# Patient Record
Sex: FEMALE | Race: WHITE | Employment: FULL TIME | URBAN - METROPOLITAN AREA
[De-identification: names, ages, dates, MRNs, and addresses within clinical notes are randomized per-mention and may not be internally consistent; named-entity substitution may affect disease eponyms.]

---

## 2020-12-09 ENCOUNTER — HOSPITAL ENCOUNTER (OUTPATIENT)
Dept: ULTRASOUND IMAGING | Facility: HOSPITAL | Age: 50
Discharge: HOME | End: 2020-12-09
Attending: NURSE PRACTITIONER

## 2020-12-09 DIAGNOSIS — N92.5 OTHER SPECIFIED IRREGULAR MENSTRUATION: ICD-10-CM

## 2020-12-09 PROCEDURE — 76830 TRANSVAGINAL US NON-OB: CPT | Performed by: NURSE PRACTITIONER

## 2020-12-09 PROCEDURE — 76856 US EXAM PELVIC COMPLETE: CPT | Performed by: NURSE PRACTITIONER

## 2021-01-15 ENCOUNTER — OFFICE VISIT (OUTPATIENT)
Dept: FAMILY MEDICINE CLINIC | Facility: CLINIC | Age: 51
End: 2021-01-15
Payer: COMMERCIAL

## 2021-01-15 VITALS
WEIGHT: 243.13 LBS | HEIGHT: 63 IN | SYSTOLIC BLOOD PRESSURE: 134 MMHG | TEMPERATURE: 98 F | HEART RATE: 71 BPM | DIASTOLIC BLOOD PRESSURE: 81 MMHG | BODY MASS INDEX: 43.08 KG/M2

## 2021-01-15 DIAGNOSIS — M25.562 CHRONIC PAIN OF BOTH KNEES: ICD-10-CM

## 2021-01-15 DIAGNOSIS — Z23 FLU VACCINE NEED: ICD-10-CM

## 2021-01-15 DIAGNOSIS — M25.561 CHRONIC PAIN OF BOTH KNEES: ICD-10-CM

## 2021-01-15 DIAGNOSIS — R03.0 ELEVATED BP WITHOUT DIAGNOSIS OF HYPERTENSION: ICD-10-CM

## 2021-01-15 DIAGNOSIS — G89.29 CHRONIC PAIN OF BOTH KNEES: ICD-10-CM

## 2021-01-15 DIAGNOSIS — G43.709 CHRONIC MIGRAINE WITHOUT AURA WITHOUT STATUS MIGRAINOSUS, NOT INTRACTABLE: ICD-10-CM

## 2021-01-15 DIAGNOSIS — D25.9 UTERINE LEIOMYOMA, UNSPECIFIED LOCATION: Primary | ICD-10-CM

## 2021-01-15 PROCEDURE — 90471 IMMUNIZATION ADMIN: CPT | Performed by: FAMILY MEDICINE

## 2021-01-15 PROCEDURE — 99203 OFFICE O/P NEW LOW 30 MIN: CPT | Performed by: FAMILY MEDICINE

## 2021-01-15 PROCEDURE — 90686 IIV4 VACC NO PRSV 0.5 ML IM: CPT | Performed by: FAMILY MEDICINE

## 2021-01-15 PROCEDURE — 3075F SYST BP GE 130 - 139MM HG: CPT | Performed by: FAMILY MEDICINE

## 2021-01-15 PROCEDURE — 3079F DIAST BP 80-89 MM HG: CPT | Performed by: FAMILY MEDICINE

## 2021-01-15 PROCEDURE — 3008F BODY MASS INDEX DOCD: CPT | Performed by: FAMILY MEDICINE

## 2021-01-15 RX ORDER — RIZATRIPTAN BENZOATE 10 MG/1
10 TABLET ORAL AS NEEDED
Qty: 15 TABLET | Refills: 0 | Status: SHIPPED | OUTPATIENT
Start: 2021-01-15 | End: 2021-02-10

## 2021-01-15 NOTE — PROGRESS NOTES
HPI:    Sena Muñoz is a 52year old adult presents to clinic as a new patient to establish care. Patient had a recent visit at [facility] for pelvic pain and irregular menses. Has a history of fibroids.   Had a recent ultrasound done-would Constitutional: No distress. HENT:   Head: Normocephalic and atraumatic. Neck: Normal range of motion. Neck supple. No thyromegaly present. Cardiovascular: Normal rate, regular rhythm and normal heart sounds.    Pulmonary/Chest: Effort normal and br Prescriptions     Signed Prescriptions Disp Refills   • Rizatriptan Benzoate 10 MG Oral Tab 15 tablet 0     Sig: Take 1 tablet (10 mg total) by mouth as needed for Migraine.        Imaging & Referrals:  FLULAVAL INFLUENZA VACCINE QUAD PRESERVATIVE FREE 0.5

## 2021-02-10 ENCOUNTER — PATIENT MESSAGE (OUTPATIENT)
Dept: FAMILY MEDICINE CLINIC | Facility: CLINIC | Age: 51
End: 2021-02-10

## 2021-02-10 ENCOUNTER — OFFICE VISIT (OUTPATIENT)
Dept: OBGYN CLINIC | Facility: CLINIC | Age: 51
End: 2021-02-10
Payer: COMMERCIAL

## 2021-02-10 VITALS
SYSTOLIC BLOOD PRESSURE: 142 MMHG | WEIGHT: 243 LBS | DIASTOLIC BLOOD PRESSURE: 96 MMHG | HEIGHT: 63 IN | BODY MASS INDEX: 43.05 KG/M2

## 2021-02-10 DIAGNOSIS — D21.9 FIBROIDS: ICD-10-CM

## 2021-02-10 DIAGNOSIS — I10 ESSENTIAL HYPERTENSION: Primary | ICD-10-CM

## 2021-02-10 DIAGNOSIS — Z12.31 BREAST CANCER SCREENING BY MAMMOGRAM: ICD-10-CM

## 2021-02-10 PROCEDURE — 3077F SYST BP >= 140 MM HG: CPT | Performed by: OBSTETRICS & GYNECOLOGY

## 2021-02-10 PROCEDURE — 3080F DIAST BP >= 90 MM HG: CPT | Performed by: OBSTETRICS & GYNECOLOGY

## 2021-02-10 PROCEDURE — 3008F BODY MASS INDEX DOCD: CPT | Performed by: OBSTETRICS & GYNECOLOGY

## 2021-02-10 PROCEDURE — 99205 OFFICE O/P NEW HI 60 MIN: CPT | Performed by: OBSTETRICS & GYNECOLOGY

## 2021-02-10 RX ORDER — LORATADINE 10 MG/1
10 TABLET ORAL DAILY
COMMUNITY

## 2021-02-10 RX ORDER — IBUPROFEN 600 MG/1
600 TABLET ORAL EVERY 6 HOURS PRN
Status: ON HOLD | COMMUNITY
End: 2021-03-11

## 2021-02-10 NOTE — TELEPHONE ENCOUNTER
From: Sena Muñoz  To: Jose F ePrez MD  Sent: 2/10/2021 12:59 AM CST  Subject: Prescription Question    Dear Dr. Grant Madrigal,    I kept meaning to message you about the Rizatriptan Benzoate you prescribed for my migraines.  Unfortunately, it did not go

## 2021-02-10 NOTE — PROGRESS NOTES
NEW GYN H&P     2/10/2021  11:02 AM    Patient presents with:  New Patient: referred by Dr. Radha Sawyer for large Fibroid seen on 2020 N([place])  Annual: Ariadna Morris   .    HPI: Patient is a 52year old  LMP 21 referred to consult about l bladder issue        Penicillin G            NAUSEA AND VOMITING  Family History   Problem Relation Age of Onset   • Heart Disorder Father    • Heart Disorder Mother    • Breast Cancer Sister 28     Social History    Socioeconomic History      Marital stat masses  Cul-de-sac: normal  R/V: normal perineum, no hemorrhoids  EXTREMITIES:  nontender without edema        A/P: Patient is 52year old female       1. Essential hypertension  - Cklearance for surgery form PCP    2.  Fibroids  - Desires FENG with ovarian

## 2021-02-12 ENCOUNTER — TELEPHONE (OUTPATIENT)
Dept: OBGYN CLINIC | Facility: CLINIC | Age: 51
End: 2021-02-12

## 2021-02-12 NOTE — TELEPHONE ENCOUNTER
Spoke to patient and patient would like to have surgery on 3/9/21.   Patient set up for presurgical visit with Dr. Bryan Canseco on 3/3/21

## 2021-02-12 NOTE — TELEPHONE ENCOUNTER
Spoke with Dr. Bethanie Max about scheduling surgery. EB wants patient to have pre-op clearance letter from PCP Dr. Michael Wiggins because of untreated hypertension. Patient will call Dr. Michael Wigigns to schedule appointment for pre-operative clearance.   I asked the kallie

## 2021-02-12 NOTE — TELEPHONE ENCOUNTER
The patient called back and wanted to know what to do with the medication she does not need anymore. Informed the patient of the drop off medication box at the [facility].  She stated she will take them there.

## 2021-02-17 ENCOUNTER — HOSPITAL ENCOUNTER (OUTPATIENT)
Dept: GENERAL RADIOLOGY | Facility: HOSPITAL | Age: 51
Discharge: HOME | End: 2021-02-17
Attending: FAMILY MEDICINE
Payer: COMMERCIAL

## 2021-02-17 DIAGNOSIS — M25.562 CHRONIC PAIN OF BOTH KNEES: ICD-10-CM

## 2021-02-17 DIAGNOSIS — G89.29 CHRONIC PAIN OF BOTH KNEES: ICD-10-CM

## 2021-02-17 DIAGNOSIS — M25.561 CHRONIC PAIN OF BOTH KNEES: ICD-10-CM

## 2021-02-17 PROCEDURE — 73564 X-RAY EXAM KNEE 4 OR MORE: CPT | Performed by: FAMILY MEDICINE

## 2021-02-22 ENCOUNTER — OFFICE VISIT (OUTPATIENT)
Dept: FAMILY MEDICINE CLINIC | Facility: CLINIC | Age: 51
End: 2021-02-22
Payer: COMMERCIAL

## 2021-02-22 VITALS
WEIGHT: 246.5 LBS | DIASTOLIC BLOOD PRESSURE: 87 MMHG | HEIGHT: 63 IN | HEART RATE: 101 BPM | BODY MASS INDEX: 43.68 KG/M2 | SYSTOLIC BLOOD PRESSURE: 137 MMHG | TEMPERATURE: 97 F

## 2021-02-22 DIAGNOSIS — M17.0 ARTHRITIS OF BOTH KNEES: ICD-10-CM

## 2021-02-22 DIAGNOSIS — R03.0 ELEVATED BLOOD PRESSURE READING: Primary | ICD-10-CM

## 2021-02-22 PROCEDURE — 3075F SYST BP GE 130 - 139MM HG: CPT | Performed by: FAMILY MEDICINE

## 2021-02-22 PROCEDURE — 3008F BODY MASS INDEX DOCD: CPT | Performed by: FAMILY MEDICINE

## 2021-02-22 PROCEDURE — 99214 OFFICE O/P EST MOD 30 MIN: CPT | Performed by: FAMILY MEDICINE

## 2021-02-22 PROCEDURE — 3079F DIAST BP 80-89 MM HG: CPT | Performed by: FAMILY MEDICINE

## 2021-02-22 NOTE — PROGRESS NOTES
HPI:    Barbara Botello is a 52year old adult presents to clinic for follow-up. Is having hysterectomy next month with Dr. Nancy Cross, reports that her last visit her blood pressure was elevated.  Patient denies HAs, blurry vision, nausea, vomiting, CP, p motion. Neck supple. No thyromegaly present. Cardiovascular: Normal rate, regular rhythm and normal heart sounds. Pulmonary/Chest: Effort normal and breath sounds normal. No respiratory distress. Maximiliano Dong has no wheezes.  Maximiliano Dong has no

## 2021-02-24 ENCOUNTER — ORDERS FOR ADMISSION (OUTPATIENT)
Dept: OBGYN CLINIC | Facility: CLINIC | Age: 51
End: 2021-02-24

## 2021-02-24 RX ORDER — HEPARIN SODIUM 5000 [USP'U]/ML
5000 INJECTION, SOLUTION INTRAVENOUS; SUBCUTANEOUS ONCE
Status: CANCELLED | OUTPATIENT
Start: 2021-03-09 | End: 2021-02-24

## 2021-02-24 RX ORDER — SODIUM CHLORIDE, SODIUM LACTATE, POTASSIUM CHLORIDE, CALCIUM CHLORIDE 600; 310; 30; 20 MG/100ML; MG/100ML; MG/100ML; MG/100ML
INJECTION, SOLUTION INTRAVENOUS CONTINUOUS
Status: CANCELLED | OUTPATIENT
Start: 2021-02-24

## 2021-03-02 ENCOUNTER — OFFICE VISIT (OUTPATIENT)
Dept: ORTHOPEDICS CLINIC | Facility: CLINIC | Age: 51
End: 2021-03-02
Payer: COMMERCIAL

## 2021-03-02 VITALS — WEIGHT: 242 LBS | BODY MASS INDEX: 42.88 KG/M2 | HEIGHT: 63 IN

## 2021-03-02 DIAGNOSIS — M17.0 BILATERAL PRIMARY OSTEOARTHRITIS OF KNEE: Primary | ICD-10-CM

## 2021-03-02 PROCEDURE — 3008F BODY MASS INDEX DOCD: CPT | Performed by: ORTHOPAEDIC SURGERY

## 2021-03-02 PROCEDURE — 99203 OFFICE O/P NEW LOW 30 MIN: CPT | Performed by: ORTHOPAEDIC SURGERY

## 2021-03-03 ENCOUNTER — OFFICE VISIT (OUTPATIENT)
Dept: OBGYN CLINIC | Facility: CLINIC | Age: 51
End: 2021-03-03
Payer: COMMERCIAL

## 2021-03-03 VITALS
DIASTOLIC BLOOD PRESSURE: 90 MMHG | WEIGHT: 248 LBS | HEIGHT: 63 IN | BODY MASS INDEX: 43.94 KG/M2 | SYSTOLIC BLOOD PRESSURE: 142 MMHG

## 2021-03-03 DIAGNOSIS — D21.9 FIBROIDS: ICD-10-CM

## 2021-03-03 DIAGNOSIS — Z01.818 PRE-OP EVALUATION: Primary | ICD-10-CM

## 2021-03-03 PROCEDURE — 3008F BODY MASS INDEX DOCD: CPT | Performed by: OBSTETRICS & GYNECOLOGY

## 2021-03-03 PROCEDURE — 3077F SYST BP >= 140 MM HG: CPT | Performed by: OBSTETRICS & GYNECOLOGY

## 2021-03-03 PROCEDURE — 3080F DIAST BP >= 90 MM HG: CPT | Performed by: OBSTETRICS & GYNECOLOGY

## 2021-03-03 PROCEDURE — 99215 OFFICE O/P EST HI 40 MIN: CPT | Performed by: OBSTETRICS & GYNECOLOGY

## 2021-03-03 NOTE — H&P
[address], [facility]    History & Physical    Pilar Jeanie Patient Status:  No patient class for patient encounter    1970 MRN [id number]   Location [address] Psychiatric: no complaints  Endocrine:no complaints  Neurological: no complaints  Immunological: no complaints  Musculoskeletal:no complaints    Physical Exam:   /90   Ht 63\"   Wt 248 lb (112.5 kg)   LMP 02/04/2021 (Exact Date)   BMI 43.93 kg/m²

## 2021-03-03 NOTE — H&P
NURSING INTAKE COMMENTS: Patient presents with:  Knee Pain: Bilateral- pt states pain started zohaib 1 yr ago, but pain increased in  the past 4 months. pt denies any injury. . pt had XR.       HPI: This 52year old adult presents today with complaints of  Review of Systems:  GENERAL: feels generally well, no recent fevers or chills, no significant weight loss or weight gain  SKIN: denies worrisome skin lesions  EYES: denies blurred vision or double vision  HEENT: denies new nasal congestion  PULM: denie similar but less pronounced changes involving the lateral compartment. SOFT TISSUES: Negative. No visible soft tissue swelling. EFFUSION: Small joint effusion noted. OTHER: Negative. CONCLUSION:  1. Demineralization.  2. Moderate to severe osteoarth and Tylenol. We also discussed knee injections options including corticosteroid and viscosupplement injections. I discussed the risks, benefits and alternatives to these injections with the patient.   Finally, I discussed definitive surgical management wi

## 2021-03-03 NOTE — H&P (VIEW-ONLY)
[address], [facility]    History & Physical    Mercedes Morrison Patient Status:  No patient class for patient encounter    1970 MRN [id number]   Location [address] Psychiatric: no complaints  Endocrine:no complaints  Neurological: no complaints  Immunological: no complaints  Musculoskeletal:no complaints    Physical Exam:   /90   Ht 63\"   Wt 248 lb (112.5 kg)   LMP 02/04/2021 (Exact Date)   BMI 43.93 kg/m²

## 2021-03-06 ENCOUNTER — LAB ENCOUNTER (OUTPATIENT)
Dept: LAB | Facility: HOSPITAL | Age: 51
End: 2021-03-06
Attending: OBSTETRICS & GYNECOLOGY
Payer: COMMERCIAL

## 2021-03-06 DIAGNOSIS — Z01.818 PREOP TESTING: ICD-10-CM

## 2021-03-06 LAB
ANTIBODY SCREEN: NEGATIVE
BASOPHILS # BLD AUTO: 0.08 X10(3) UL (ref 0–0.2)
BASOPHILS NFR BLD AUTO: 1 %
DEPRECATED RDW RBC AUTO: 47.4 FL (ref 35.1–46.3)
EOSINOPHIL # BLD AUTO: 0.28 X10(3) UL (ref 0–0.7)
EOSINOPHIL NFR BLD AUTO: 3.4 %
ERYTHROCYTE [DISTWIDTH] IN BLOOD BY AUTOMATED COUNT: 15.7 % (ref 11–15)
HCT VFR BLD AUTO: 34.8 %
HGB BLD-MCNC: 10.6 G/DL
IMM GRANULOCYTES # BLD AUTO: 0.02 X10(3) UL (ref 0–1)
IMM GRANULOCYTES NFR BLD: 0.2 %
LYMPHOCYTES # BLD AUTO: 2.05 X10(3) UL (ref 1–4)
LYMPHOCYTES NFR BLD AUTO: 24.8 %
MCH RBC QN AUTO: 25.1 PG (ref 26–34)
MCHC RBC AUTO-ENTMCNC: 30.5 G/DL (ref 31–37)
MCV RBC AUTO: 82.5 FL
MONOCYTES # BLD AUTO: 0.7 X10(3) UL (ref 0.1–1)
MONOCYTES NFR BLD AUTO: 8.5 %
NEUTROPHILS # BLD AUTO: 5.12 X10 (3) UL (ref 1.5–7.7)
NEUTROPHILS # BLD AUTO: 5.12 X10(3) UL (ref 1.5–7.7)
NEUTROPHILS NFR BLD AUTO: 62.1 %
PLATELET # BLD AUTO: 351 10(3)UL (ref 150–450)
RBC # BLD AUTO: 4.22 X10(6)UL
RH BLOOD TYPE: POSITIVE
WBC # BLD AUTO: 8.3 X10(3) UL (ref 4–11)

## 2021-03-06 PROCEDURE — 36415 COLL VENOUS BLD VENIPUNCTURE: CPT

## 2021-03-06 PROCEDURE — 86901 BLOOD TYPING SEROLOGIC RH(D): CPT

## 2021-03-06 PROCEDURE — 86850 RBC ANTIBODY SCREEN: CPT

## 2021-03-06 PROCEDURE — 86900 BLOOD TYPING SEROLOGIC ABO: CPT

## 2021-03-06 PROCEDURE — 85025 COMPLETE CBC W/AUTO DIFF WBC: CPT

## 2021-03-07 LAB — SARS-COV-2 RNA RESP QL NAA+PROBE: NOT DETECTED

## 2021-03-09 ENCOUNTER — HOSPITAL ENCOUNTER (INPATIENT)
Facility: HOSPITAL | Age: 51
LOS: 2 days | Discharge: HOME | DRG: 743 | End: 2021-03-11
Attending: OBSTETRICS & GYNECOLOGY | Admitting: OBSTETRICS & GYNECOLOGY
Payer: COMMERCIAL

## 2021-03-09 ENCOUNTER — ANESTHESIA EVENT (OUTPATIENT)
Dept: SURGERY | Facility: HOSPITAL | Age: 51
DRG: 743 | End: 2021-03-09
Payer: COMMERCIAL

## 2021-03-09 ENCOUNTER — ANESTHESIA (OUTPATIENT)
Dept: SURGERY | Facility: HOSPITAL | Age: 51
DRG: 743 | End: 2021-03-09
Payer: COMMERCIAL

## 2021-03-09 DIAGNOSIS — D21.9 FIBROIDS: ICD-10-CM

## 2021-03-09 DIAGNOSIS — Z01.818 PREOP TESTING: Primary | ICD-10-CM

## 2021-03-09 PROBLEM — Z98.890 POST-OPERATIVE STATE: Status: ACTIVE | Noted: 2021-03-09

## 2021-03-09 LAB — B-HCG UR QL: NEGATIVE

## 2021-03-09 PROCEDURE — 0UT20ZZ RESECTION OF BILATERAL OVARIES, OPEN APPROACH: ICD-10-PCS | Performed by: OBSTETRICS & GYNECOLOGY

## 2021-03-09 PROCEDURE — 0UT90ZZ RESECTION OF UTERUS, OPEN APPROACH: ICD-10-PCS | Performed by: OBSTETRICS & GYNECOLOGY

## 2021-03-09 PROCEDURE — 0UT70ZZ RESECTION OF BILATERAL FALLOPIAN TUBES, OPEN APPROACH: ICD-10-PCS | Performed by: OBSTETRICS & GYNECOLOGY

## 2021-03-09 RX ORDER — HYDROCODONE BITARTRATE AND ACETAMINOPHEN 5; 325 MG/1; MG/1
2 TABLET ORAL AS NEEDED
Status: DISCONTINUED | OUTPATIENT
Start: 2021-03-09 | End: 2021-03-09 | Stop reason: HOSPADM

## 2021-03-09 RX ORDER — PROCHLORPERAZINE EDISYLATE 5 MG/ML
5 INJECTION INTRAMUSCULAR; INTRAVENOUS ONCE AS NEEDED
Status: COMPLETED | OUTPATIENT
Start: 2021-03-09 | End: 2021-03-09

## 2021-03-09 RX ORDER — HYDROMORPHONE HYDROCHLORIDE 1 MG/ML
0.6 INJECTION, SOLUTION INTRAMUSCULAR; INTRAVENOUS; SUBCUTANEOUS EVERY 5 MIN PRN
Status: DISCONTINUED | OUTPATIENT
Start: 2021-03-09 | End: 2021-03-09 | Stop reason: HOSPADM

## 2021-03-09 RX ORDER — SODIUM PHOSPHATE, DIBASIC AND SODIUM PHOSPHATE, MONOBASIC 7; 19 G/133ML; G/133ML
1 ENEMA RECTAL ONCE AS NEEDED
Status: DISCONTINUED | OUTPATIENT
Start: 2021-03-09 | End: 2021-03-11

## 2021-03-09 RX ORDER — MORPHINE SULFATE 10 MG/ML
6 INJECTION, SOLUTION INTRAMUSCULAR; INTRAVENOUS EVERY 10 MIN PRN
Status: DISCONTINUED | OUTPATIENT
Start: 2021-03-09 | End: 2021-03-09 | Stop reason: HOSPADM

## 2021-03-09 RX ORDER — ONDANSETRON 2 MG/ML
INJECTION INTRAMUSCULAR; INTRAVENOUS AS NEEDED
Status: DISCONTINUED | OUTPATIENT
Start: 2021-03-09 | End: 2021-03-09 | Stop reason: SURG

## 2021-03-09 RX ORDER — MORPHINE SULFATE 4 MG/ML
4 INJECTION, SOLUTION INTRAMUSCULAR; INTRAVENOUS EVERY 10 MIN PRN
Status: DISCONTINUED | OUTPATIENT
Start: 2021-03-09 | End: 2021-03-09 | Stop reason: HOSPADM

## 2021-03-09 RX ORDER — SODIUM CHLORIDE, SODIUM LACTATE, POTASSIUM CHLORIDE, CALCIUM CHLORIDE 600; 310; 30; 20 MG/100ML; MG/100ML; MG/100ML; MG/100ML
INJECTION, SOLUTION INTRAVENOUS CONTINUOUS
Status: DISCONTINUED | OUTPATIENT
Start: 2021-03-09 | End: 2021-03-09 | Stop reason: HOSPADM

## 2021-03-09 RX ORDER — MIDAZOLAM HYDROCHLORIDE 1 MG/ML
INJECTION INTRAMUSCULAR; INTRAVENOUS AS NEEDED
Status: DISCONTINUED | OUTPATIENT
Start: 2021-03-09 | End: 2021-03-09 | Stop reason: SURG

## 2021-03-09 RX ORDER — SODIUM CHLORIDE, SODIUM LACTATE, POTASSIUM CHLORIDE, CALCIUM CHLORIDE 600; 310; 30; 20 MG/100ML; MG/100ML; MG/100ML; MG/100ML
INJECTION, SOLUTION INTRAVENOUS CONTINUOUS
Status: DISCONTINUED | OUTPATIENT
Start: 2021-03-09 | End: 2021-03-11

## 2021-03-09 RX ORDER — HALOPERIDOL 5 MG/ML
0.25 INJECTION INTRAMUSCULAR ONCE AS NEEDED
Status: DISCONTINUED | OUTPATIENT
Start: 2021-03-09 | End: 2021-03-09 | Stop reason: HOSPADM

## 2021-03-09 RX ORDER — ACETAMINOPHEN 500 MG
1000 TABLET ORAL ONCE
Status: COMPLETED | OUTPATIENT
Start: 2021-03-09 | End: 2021-03-09

## 2021-03-09 RX ORDER — HYDROMORPHONE HYDROCHLORIDE 1 MG/ML
0.2 INJECTION, SOLUTION INTRAMUSCULAR; INTRAVENOUS; SUBCUTANEOUS EVERY 2 HOUR PRN
Status: DISCONTINUED | OUTPATIENT
Start: 2021-03-09 | End: 2021-03-11

## 2021-03-09 RX ORDER — NALOXONE HYDROCHLORIDE 0.4 MG/ML
80 INJECTION, SOLUTION INTRAMUSCULAR; INTRAVENOUS; SUBCUTANEOUS AS NEEDED
Status: DISCONTINUED | OUTPATIENT
Start: 2021-03-09 | End: 2021-03-09 | Stop reason: HOSPADM

## 2021-03-09 RX ORDER — DOCUSATE SODIUM 100 MG/1
100 CAPSULE, LIQUID FILLED ORAL 2 TIMES DAILY
Status: DISCONTINUED | OUTPATIENT
Start: 2021-03-09 | End: 2021-03-11

## 2021-03-09 RX ORDER — ONDANSETRON 2 MG/ML
4 INJECTION INTRAMUSCULAR; INTRAVENOUS EVERY 8 HOURS PRN
Status: DISCONTINUED | OUTPATIENT
Start: 2021-03-09 | End: 2021-03-11

## 2021-03-09 RX ORDER — EPHEDRINE SULFATE 50 MG/ML
INJECTION, SOLUTION INTRAVENOUS AS NEEDED
Status: DISCONTINUED | OUTPATIENT
Start: 2021-03-09 | End: 2021-03-09 | Stop reason: SURG

## 2021-03-09 RX ORDER — BISACODYL 10 MG
10 SUPPOSITORY, RECTAL RECTAL
Status: DISCONTINUED | OUTPATIENT
Start: 2021-03-09 | End: 2021-03-11

## 2021-03-09 RX ORDER — CEFAZOLIN SODIUM/WATER 2 G/20 ML
2 SYRINGE (ML) INTRAVENOUS ONCE
Status: COMPLETED | OUTPATIENT
Start: 2021-03-09 | End: 2021-03-09

## 2021-03-09 RX ORDER — HYDROMORPHONE HYDROCHLORIDE 1 MG/ML
0.2 INJECTION, SOLUTION INTRAMUSCULAR; INTRAVENOUS; SUBCUTANEOUS EVERY 5 MIN PRN
Status: DISCONTINUED | OUTPATIENT
Start: 2021-03-09 | End: 2021-03-09 | Stop reason: HOSPADM

## 2021-03-09 RX ORDER — HYDROMORPHONE HYDROCHLORIDE 1 MG/ML
0.4 INJECTION, SOLUTION INTRAMUSCULAR; INTRAVENOUS; SUBCUTANEOUS EVERY 5 MIN PRN
Status: DISCONTINUED | OUTPATIENT
Start: 2021-03-09 | End: 2021-03-09 | Stop reason: HOSPADM

## 2021-03-09 RX ORDER — HYDROCODONE BITARTRATE AND ACETAMINOPHEN 5; 325 MG/1; MG/1
1 TABLET ORAL AS NEEDED
Status: DISCONTINUED | OUTPATIENT
Start: 2021-03-09 | End: 2021-03-09 | Stop reason: HOSPADM

## 2021-03-09 RX ORDER — KETOROLAC TROMETHAMINE 30 MG/ML
30 INJECTION, SOLUTION INTRAMUSCULAR; INTRAVENOUS EVERY 6 HOURS
Status: DISCONTINUED | OUTPATIENT
Start: 2021-03-09 | End: 2021-03-11

## 2021-03-09 RX ORDER — POLYETHYLENE GLYCOL 3350 17 G/17G
17 POWDER, FOR SOLUTION ORAL DAILY PRN
Status: DISCONTINUED | OUTPATIENT
Start: 2021-03-09 | End: 2021-03-11

## 2021-03-09 RX ORDER — SODIUM CHLORIDE 0.9 % (FLUSH) 0.9 %
10 SYRINGE (ML) INJECTION AS NEEDED
Status: DISCONTINUED | OUTPATIENT
Start: 2021-03-09 | End: 2021-03-11

## 2021-03-09 RX ORDER — PHENYLEPHRINE HCL 10 MG/ML
VIAL (ML) INJECTION AS NEEDED
Status: DISCONTINUED | OUTPATIENT
Start: 2021-03-09 | End: 2021-03-09 | Stop reason: SURG

## 2021-03-09 RX ORDER — DEXTROSE AND SODIUM CHLORIDE 5; .9 G/100ML; G/100ML
INJECTION, SOLUTION INTRAVENOUS CONTINUOUS
Status: DISCONTINUED | OUTPATIENT
Start: 2021-03-09 | End: 2021-03-11

## 2021-03-09 RX ORDER — HYDROMORPHONE HYDROCHLORIDE 1 MG/ML
0.8 INJECTION, SOLUTION INTRAMUSCULAR; INTRAVENOUS; SUBCUTANEOUS EVERY 2 HOUR PRN
Status: DISCONTINUED | OUTPATIENT
Start: 2021-03-09 | End: 2021-03-11

## 2021-03-09 RX ORDER — ROCURONIUM BROMIDE 10 MG/ML
INJECTION, SOLUTION INTRAVENOUS AS NEEDED
Status: DISCONTINUED | OUTPATIENT
Start: 2021-03-09 | End: 2021-03-09 | Stop reason: SURG

## 2021-03-09 RX ORDER — ESTRADIOL 0.05 MG/D
1 PATCH TRANSDERMAL WEEKLY
Status: DISCONTINUED | OUTPATIENT
Start: 2021-03-09 | End: 2021-03-11

## 2021-03-09 RX ORDER — ONDANSETRON 2 MG/ML
4 INJECTION INTRAMUSCULAR; INTRAVENOUS ONCE AS NEEDED
Status: COMPLETED | OUTPATIENT
Start: 2021-03-09 | End: 2021-03-09

## 2021-03-09 RX ORDER — HYDROCODONE BITARTRATE AND ACETAMINOPHEN 7.5; 325 MG/1; MG/1
2 TABLET ORAL EVERY 4 HOURS PRN
Status: DISCONTINUED | OUTPATIENT
Start: 2021-03-09 | End: 2021-03-11

## 2021-03-09 RX ORDER — FAMOTIDINE 20 MG/1
20 TABLET ORAL ONCE
Status: COMPLETED | OUTPATIENT
Start: 2021-03-09 | End: 2021-03-09

## 2021-03-09 RX ORDER — LIDOCAINE HYDROCHLORIDE 10 MG/ML
INJECTION, SOLUTION EPIDURAL; INFILTRATION; INTRACAUDAL; PERINEURAL AS NEEDED
Status: DISCONTINUED | OUTPATIENT
Start: 2021-03-09 | End: 2021-03-09 | Stop reason: SURG

## 2021-03-09 RX ORDER — HYDROMORPHONE HYDROCHLORIDE 1 MG/ML
0.4 INJECTION, SOLUTION INTRAMUSCULAR; INTRAVENOUS; SUBCUTANEOUS EVERY 2 HOUR PRN
Status: DISCONTINUED | OUTPATIENT
Start: 2021-03-09 | End: 2021-03-11

## 2021-03-09 RX ORDER — HYDROCODONE BITARTRATE AND ACETAMINOPHEN 7.5; 325 MG/1; MG/1
1 TABLET ORAL EVERY 4 HOURS PRN
Status: DISCONTINUED | OUTPATIENT
Start: 2021-03-09 | End: 2021-03-11

## 2021-03-09 RX ORDER — DEXAMETHASONE SODIUM PHOSPHATE 4 MG/ML
VIAL (ML) INJECTION AS NEEDED
Status: DISCONTINUED | OUTPATIENT
Start: 2021-03-09 | End: 2021-03-09 | Stop reason: SURG

## 2021-03-09 RX ORDER — HEPARIN SODIUM 5000 [USP'U]/ML
5000 INJECTION, SOLUTION INTRAVENOUS; SUBCUTANEOUS ONCE
Status: COMPLETED | OUTPATIENT
Start: 2021-03-09 | End: 2021-03-09

## 2021-03-09 RX ORDER — HEPARIN SODIUM 5000 [USP'U]/ML
5000 INJECTION, SOLUTION INTRAVENOUS; SUBCUTANEOUS EVERY 12 HOURS SCHEDULED
Status: DISCONTINUED | OUTPATIENT
Start: 2021-03-09 | End: 2021-03-11

## 2021-03-09 RX ORDER — KETOROLAC TROMETHAMINE 30 MG/ML
INJECTION, SOLUTION INTRAMUSCULAR; INTRAVENOUS AS NEEDED
Status: DISCONTINUED | OUTPATIENT
Start: 2021-03-09 | End: 2021-03-09 | Stop reason: SURG

## 2021-03-09 RX ORDER — METOCLOPRAMIDE 10 MG/1
10 TABLET ORAL ONCE
Status: COMPLETED | OUTPATIENT
Start: 2021-03-09 | End: 2021-03-09

## 2021-03-09 RX ORDER — ACETAMINOPHEN 325 MG/1
650 TABLET ORAL EVERY 4 HOURS PRN
Status: DISCONTINUED | OUTPATIENT
Start: 2021-03-09 | End: 2021-03-11

## 2021-03-09 RX ORDER — MORPHINE SULFATE 4 MG/ML
2 INJECTION, SOLUTION INTRAMUSCULAR; INTRAVENOUS EVERY 10 MIN PRN
Status: DISCONTINUED | OUTPATIENT
Start: 2021-03-09 | End: 2021-03-09 | Stop reason: HOSPADM

## 2021-03-09 RX ORDER — ONDANSETRON 4 MG/1
4 TABLET, FILM COATED ORAL EVERY 8 HOURS PRN
Status: DISCONTINUED | OUTPATIENT
Start: 2021-03-09 | End: 2021-03-11

## 2021-03-09 RX ADMIN — ROCURONIUM BROMIDE 5 MG: 10 INJECTION, SOLUTION INTRAVENOUS at 09:38:00

## 2021-03-09 RX ADMIN — SODIUM CHLORIDE, SODIUM LACTATE, POTASSIUM CHLORIDE, CALCIUM CHLORIDE: 600; 310; 30; 20 INJECTION, SOLUTION INTRAVENOUS at 10:50:00

## 2021-03-09 RX ADMIN — ROCURONIUM BROMIDE 30 MG: 10 INJECTION, SOLUTION INTRAVENOUS at 07:49:00

## 2021-03-09 RX ADMIN — ROCURONIUM BROMIDE 5 MG: 10 INJECTION, SOLUTION INTRAVENOUS at 10:21:00

## 2021-03-09 RX ADMIN — SODIUM CHLORIDE, SODIUM LACTATE, POTASSIUM CHLORIDE, CALCIUM CHLORIDE: 600; 310; 30; 20 INJECTION, SOLUTION INTRAVENOUS at 08:45:00

## 2021-03-09 RX ADMIN — ROCURONIUM BROMIDE 5 MG: 10 INJECTION, SOLUTION INTRAVENOUS at 07:39:00

## 2021-03-09 RX ADMIN — CEFAZOLIN SODIUM/WATER 2 G: 2 G/20 ML SYRINGE (ML) INTRAVENOUS at 07:37:00

## 2021-03-09 RX ADMIN — MIDAZOLAM HYDROCHLORIDE 2 MG: 1 INJECTION INTRAMUSCULAR; INTRAVENOUS at 07:34:00

## 2021-03-09 RX ADMIN — EPHEDRINE SULFATE 7.5 MG: 50 INJECTION, SOLUTION INTRAVENOUS at 08:04:00

## 2021-03-09 RX ADMIN — ROCURONIUM BROMIDE 5 MG: 10 INJECTION, SOLUTION INTRAVENOUS at 08:45:00

## 2021-03-09 RX ADMIN — LIDOCAINE HYDROCHLORIDE 50 MG: 10 INJECTION, SOLUTION EPIDURAL; INFILTRATION; INTRACAUDAL; PERINEURAL at 07:40:00

## 2021-03-09 RX ADMIN — DEXAMETHASONE SODIUM PHOSPHATE 4 MG: 4 MG/ML VIAL (ML) INJECTION at 07:49:00

## 2021-03-09 RX ADMIN — PHENYLEPHRINE HCL 100 MCG: 10 MG/ML VIAL (ML) INJECTION at 07:57:00

## 2021-03-09 RX ADMIN — ONDANSETRON 4 MG: 2 INJECTION INTRAMUSCULAR; INTRAVENOUS at 10:33:00

## 2021-03-09 RX ADMIN — ROCURONIUM BROMIDE 5 MG: 10 INJECTION, SOLUTION INTRAVENOUS at 09:16:00

## 2021-03-09 RX ADMIN — KETOROLAC TROMETHAMINE 30 MG: 30 INJECTION, SOLUTION INTRAMUSCULAR; INTRAVENOUS at 10:31:00

## 2021-03-09 NOTE — INTERVAL H&P NOTE
Pre-op Diagnosis: fibroids    The above referenced H&P was reviewed by Greg Chavez MD on 3/9/2021, the patient was examined and no significant changes have occurred in the patient's condition since the H&P was performed.   I discussed with the patient

## 2021-03-09 NOTE — OPERATIVE REPORT
[facility]    PATIENT'S NAME: Marycruz Rodriguez   ATTENDING PHYSICIAN: Ludwig Corea MD   OPERATING PHYSICIAN: Ludwig Corea MD   PATIENT ACCOUNT#:   [de-identified]    LOCATION:  [address] [place] [address] RECORD #:   [id number]       DATE OF BIRTH: using a double-tooth tenaculum at the fundus of the uterus as the uterus was elevated out of the pelvis.   There were noted to be normal fallopian tubes and ovaries bilaterally, and a location of the anterior fibroid was in the fundal position, approximatel superior inferior edges using a looped 0 PDS mass closure, with excellent reapproximation noted. The subcutaneous layer was closed in 3 layers using 2-0 chromic, with good reapproximation. Sterile staples were then applied for closure of the skin.   An ab

## 2021-03-09 NOTE — ANESTHESIA PROCEDURE NOTES
Airway  Urgency: Elective      General Information and Staff    Patient location during procedure: OR  Anesthesiologist: Vicki Felipe MD  Resident/CRNA: Luis Miguel Barnhart CRNA  Performed: CRNA     Indications and Patient Condition  Indications for airw

## 2021-03-09 NOTE — ANESTHESIA POSTPROCEDURE EVALUATION
Patient: Marlen Sigala    Procedure Summary     Date: 03/09/21 Room / Location: [address] [place] [address] MAIN OR 02 / [facility] MAIN OR    Anesthesia Start: 3196 Anesthesia Stop: 7317    Procedure: supracervical total abdominal hysterectomy, bilateral salpingo, oophorectomy (N/A

## 2021-03-09 NOTE — ANESTHESIA PREPROCEDURE EVALUATION
Anesthesia PreOp Note    HPI:     Suha Blanco is a 52year old adult who presents for preoperative consultation requested by: Jonathan Bergeron MD    Date of Surgery: 3/9/2021    Procedure(s):  total abdominal hysterectomy  Indication: fibroids    Re Relation Age of Onset   • Heart Disorder Father    • Heart Disorder Mother    • Breast Cancer Sister 28     Social History    Socioeconomic History      Marital status: Single      Spouse name: Not on file      Number of children: Not on file      Years of or Organization Meetings:       Marital Status:   Intimate Partner Violence:       Fear of Current or Ex-Partner:       Emotionally Abused:       Physically Abused:       Sexually Abused:     Available pre-op labs reviewed.   Lab Results   Component Value D patient's questions were answered to the best of my ability. The patient desires the anesthetic management as planned.   Kathia Saleh  3/9/2021 7:02 AM

## 2021-03-10 LAB
DEPRECATED RDW RBC AUTO: 48.8 FL (ref 35.1–46.3)
ERYTHROCYTE [DISTWIDTH] IN BLOOD BY AUTOMATED COUNT: 15.8 % (ref 11–15)
HCT VFR BLD AUTO: 28.8 %
HGB BLD-MCNC: 8.6 G/DL
MCH RBC QN AUTO: 25.3 PG (ref 26–34)
MCHC RBC AUTO-ENTMCNC: 29.9 G/DL (ref 31–37)
MCV RBC AUTO: 84.7 FL
PLATELET # BLD AUTO: 258 10(3)UL (ref 150–450)
RBC # BLD AUTO: 3.4 X10(6)UL
WBC # BLD AUTO: 9.3 X10(3) UL (ref 4–11)

## 2021-03-10 PROCEDURE — 58180 PARTIAL HYSTERECTOMY: CPT | Performed by: OBSTETRICS & GYNECOLOGY

## 2021-03-10 RX ORDER — IRON POLYSACCHARIDE COMPLEX 150 MG
150 CAPSULE ORAL DAILY
Status: DISCONTINUED | OUTPATIENT
Start: 2021-03-10 | End: 2021-03-11

## 2021-03-10 NOTE — PLAN OF CARE
Pt A/O x4, VSS. Voiding freely. Up ad george. Tolerating diet. Norco & schd Toradol for pain. Surgical incision C/D/I. Fall precautions in place. Call light within reach. Calls appropriately. Frequent rounding. Anticipate dc home tomorrow.  Will continue to mo unsuccessful or patient reports new pain  - Anticipate increased pain with activity and pre-medicate as appropriate  Outcome: Progressing     Problem: RISK FOR INFECTION - ADULT  Goal: Absence of fever/infection during anticipated neutropenic period  Descr and tolerated  - Evaluate effectiveness of GI medications  - Encourage mobilization and activity  - Obtain nutritional consult as needed  - Establish a toileting routine/schedule  - Consider collaborating with pharmacy to review patient's medication profil

## 2021-03-10 NOTE — PROGRESS NOTES
Pain well controlled. No vaginal bleeding. No flatus. Has not urinated yet    Temp    98.4 . .. 98.4 . .. 98.7 . ..    Temp      Temp Source    Oral Oral        Oral   Temp Source     Heart Rate    100 94        91   Heart Rate     Rhythm     NSR

## 2021-03-10 NOTE — PLAN OF CARE
Patient alert and oriented. VSS. Minimal pain controlled with IV Dilaudid and PO tylenol. Pain in both head and at incision site. Incision clean, dry, intact. Voiding via Blum- plan to dc at 6am. No gas, belching, or bowel movements overnight.  Tolerating

## 2021-03-10 NOTE — PROGRESS NOTES
Patient alert and oriented X4, vitals stable. Scheduled toradol for pain. Continues with IVFs. Tolerating clear liquids. Blum in place. Yoanna-pad with scant bloody drainage. Dressing to abdomen intact.

## 2021-03-10 NOTE — PAYOR COMM NOTE
--------------  ADMISSION REVIEW     Payor: Ricky Georgi BY PHILL  Subscriber #:  [id number]  Authorization Number: N/A    Admit date: 3/9/21  Admit time:  5:52 AM     Admitting Physician: Shanice Medina MD  Attending Physician:  Shanice Medina, double-tooth tenaculum at the fundus of the uterus as the uterus was elevated out of the pelvis. There were noted to be normal fallopian tubes and ovaries bilaterally, and a location of the anterior fibroid was in the fundal position, approximately 15 cm. superior inferior edges using a looped 0 PDS mass closure, with excellent reapproximation noted. The subcutaneous layer was closed in 3 layers using 2-0 chromic, with good reapproximation. Sterile staples were then applied for closure of the skin.   An ab Jaziel Larry, RN      fentaNYL citrate (SUBLIMAZE) 0.05 MG/ML injection     Date Action Dose Route User    3/9/2021 1120 Given 25 mcg Intravenous Nohemi Dayhoff, CRNA    3/9/2021 1117 Given 25 mcg Intravenous Nohemi Dayhoff, CRNA    3/9/2021 1008 Given 48 m (none) Intravenous Sol Acevedo CRNA    3/9/2021 8196 Continued by Anesthesia (none) Intravenous Sol Acevedo CRNA      lidocaine PF (XYLOCAINE) 1% injection     Date Action Dose Route User    3/9/2021 0740 Given 50 mg Intravenous Sony [place] (BRIDION) 200 MG/2ML injection     Date Action Dose Route User    3/9/2021 1049 Given 440 mg Intravenous Thomas Womack, CRNA          REVIEWER COMMENTS:     FOR REVIEW/APPROVAL OF INPT ADMISSION

## 2021-03-11 VITALS
BODY MASS INDEX: 43.28 KG/M2 | RESPIRATION RATE: 20 BRPM | TEMPERATURE: 98 F | HEIGHT: 63 IN | DIASTOLIC BLOOD PRESSURE: 96 MMHG | OXYGEN SATURATION: 93 % | SYSTOLIC BLOOD PRESSURE: 149 MMHG | WEIGHT: 244.25 LBS | HEART RATE: 93 BPM

## 2021-03-11 RX ORDER — POLYETHYLENE GLYCOL 3350 17 G/17G
17 POWDER, FOR SOLUTION ORAL 2 TIMES DAILY PRN
Qty: 60 EACH | Refills: 0 | Status: SHIPPED | OUTPATIENT
Start: 2021-03-11 | End: 2021-04-10

## 2021-03-11 RX ORDER — DOCUSATE SODIUM 100 MG/1
100 CAPSULE, LIQUID FILLED ORAL 2 TIMES DAILY
Qty: 60 CAPSULE | Refills: 0 | Status: SHIPPED | OUTPATIENT
Start: 2021-03-11 | End: 2021-04-10

## 2021-03-11 RX ORDER — IBUPROFEN 600 MG/1
600 TABLET ORAL EVERY 6 HOURS PRN
Qty: 60 TABLET | Refills: 0 | Status: SHIPPED | OUTPATIENT
Start: 2021-03-11

## 2021-03-11 RX ORDER — SIMETHICONE 80 MG
80 TABLET,CHEWABLE ORAL 3 TIMES DAILY PRN
Qty: 90 TABLET | Refills: 0 | Status: SHIPPED | OUTPATIENT
Start: 2021-03-11 | End: 2021-06-18

## 2021-03-11 RX ORDER — HYDROCODONE BITARTRATE AND ACETAMINOPHEN 5; 325 MG/1; MG/1
1-2 TABLET ORAL EVERY 4 HOURS PRN
Qty: 25 TABLET | Refills: 0 | Status: SHIPPED | OUTPATIENT
Start: 2021-03-11 | End: 2021-06-18

## 2021-03-11 RX ORDER — HYDROMORPHONE HYDROCHLORIDE 2 MG/1
2 TABLET ORAL
Status: DISCONTINUED | OUTPATIENT
Start: 2021-03-11 | End: 2021-03-11

## 2021-03-11 RX ORDER — FERROUS SULFATE 325(65) MG
325 TABLET ORAL
Qty: 90 TABLET | Refills: 1 | Status: SHIPPED | OUTPATIENT
Start: 2021-03-11 | End: 2021-06-09

## 2021-03-11 RX ORDER — ACETAMINOPHEN 325 MG/1
325 TABLET ORAL EVERY 6 HOURS PRN
Qty: 60 TABLET | Refills: 0 | Status: SHIPPED | OUTPATIENT
Start: 2021-03-11 | End: 2021-07-14

## 2021-03-11 NOTE — PROGRESS NOTES
Patient appeared drowsy in AM, pt states she thinks it is related to norco 7.5. Tylenol given for pain in later AM. Surgical incision CDI, staples in place, no drainage noted. Ambulating independently. Tolerating general diet, denies nausea.  One dose of PO

## 2021-03-11 NOTE — PLAN OF CARE
Patient alert and oriented. VSS. Pain controlled with PO norco- patient refusing Toradol at this time. Pain in both head and at incision site. Incision clean, dry, intact. Voiding freely into hat. Patient is belching but no gas and no bowel movement.  Roula Bowie Manage/alleviate anxiety  - Utilize distraction and/or relaxation techniques  - Monitor for opioid side effects  - Notify MD/LIP if interventions unsuccessful or patient reports new pain  - Anticipate increased pain with activity and pre-medicate as approp returns to baseline bowel function  Description: INTERVENTIONS:  - Assess bowel function  - Maintain adequate hydration with IV or PO as ordered and tolerated  - Evaluate effectiveness of GI medications  - Encourage mobilization and activity  - Obtain nutr

## 2021-03-11 NOTE — CM/SW NOTE
03/11/21 1100   Discharge disposition   Expected discharge disposition Home or Self   Referrals provided No   Discharge transportation Private car       Per nursing rounds pt will dc home today with no home care needs.    RN to contact SW/CM if needs jeronimo

## 2021-03-11 NOTE — DISCHARGE SUMMARY
[facility]    Discharge Summary    Ralph Dickerson Patient Status:  Inpatient    1970 MRN [id number]   Location [facility] 4W/SW/SE Attending Bernard Vieira MD   Hosp Day # 2 PCP Delia Moore MD     Admit Date: 3/9/2

## 2021-03-11 NOTE — PROGRESS NOTES
[facility] - [facility]    OB/GYNE Progress Note      Bridgerharvey Sharp Patient Status:  Inpatient    1970 MRN [id number]   Location [facility] 4W/SW/SE Attending Mike Anguiano MD   Hosp Day # 2 PCP Zhanna London MD         Sorin

## 2021-03-15 ENCOUNTER — TELEPHONE (OUTPATIENT)
Dept: OBGYN CLINIC | Facility: CLINIC | Age: 51
End: 2021-03-15

## 2021-03-15 NOTE — TELEPHONE ENCOUNTER
RN spoke with EB's Bambi CHAKRABORTY in regards to scheduling pt for staple removal tomorrow.  Bambi informed this RN that pt will have staples removed by herself and EB would see patient if there were any issues    Pt had Supracervical total abdominal hysterectomy,

## 2021-03-15 NOTE — PAYOR COMM NOTE
--------------  DISCHARGE REVIEW    Payor: Antonio POLLOCK  Subscriber #:  [id number]  Authorization Number: [id number]    Admit date: 3/9/21  Admit time:   5:52 AM  Discharge Date: 3/11/2021  5:30 PM     Admitting Physician: Marisabel Chandra, abdominal hysterectomy and bilateral salpingo-oophorectomy    Discharged Condition: good    Disposition: home    Patient Instructions: Follow-up appointment with Dr. Ricco Alberts in 5 days.     Robert Patterson  3/11/2021  4:12 PM    Electronically signed by

## 2021-03-15 NOTE — TELEPHONE ENCOUNTER
PT needs an appointment tomorrow/Tuesday for staple removal  Dr schedule is full, patient says she was told to contact doctor to get a time.

## 2021-03-16 ENCOUNTER — NURSE ONLY (OUTPATIENT)
Dept: OBGYN CLINIC | Facility: CLINIC | Age: 51
End: 2021-03-16
Payer: COMMERCIAL

## 2021-03-16 DIAGNOSIS — L03.311 CELLULITIS OF ABDOMINAL WALL: ICD-10-CM

## 2021-03-16 DIAGNOSIS — Z48.02 ENCOUNTER FOR STAPLE REMOVAL: Primary | ICD-10-CM

## 2021-03-16 PROCEDURE — 99024 POSTOP FOLLOW-UP VISIT: CPT | Performed by: OBSTETRICS & GYNECOLOGY

## 2021-03-16 RX ORDER — CIPROFLOXACIN 250 MG/1
500 TABLET, FILM COATED ORAL 2 TIMES DAILY
Qty: 28 TABLET | Refills: 0 | Status: SHIPPED | OUTPATIENT
Start: 2021-03-16 | End: 2021-03-23

## 2021-03-16 NOTE — PROGRESS NOTES
Jeffry Myers is a 52year old adult.     HPI:   Patient presents with:  Surgical Followup: PROCEDURE:  Supracervical total abdominal hysterectomy and bilateral salpingo-oophorectomy done 03/09/2021  Staple Removal: pt here for staple removal surgery don one week for wound check          3/16/2021  Floresita Ann MD

## 2021-03-23 ENCOUNTER — TELEPHONE (OUTPATIENT)
Dept: OBGYN CLINIC | Facility: CLINIC | Age: 51
End: 2021-03-23

## 2021-03-23 ENCOUNTER — HOSPITAL ENCOUNTER (EMERGENCY)
Facility: HOSPITAL | Age: 51
Discharge: HOME | End: 2021-03-23
Payer: COMMERCIAL

## 2021-03-23 VITALS
RESPIRATION RATE: 16 BRPM | WEIGHT: 240 LBS | BODY MASS INDEX: 42.52 KG/M2 | HEART RATE: 88 BPM | DIASTOLIC BLOOD PRESSURE: 92 MMHG | HEIGHT: 63 IN | SYSTOLIC BLOOD PRESSURE: 145 MMHG | TEMPERATURE: 98 F | OXYGEN SATURATION: 97 %

## 2021-03-23 DIAGNOSIS — Z51.89 VISIT FOR WOUND CHECK: Primary | ICD-10-CM

## 2021-03-23 PROCEDURE — 99282 EMERGENCY DEPT VISIT SF MDM: CPT

## 2021-03-23 NOTE — TELEPHONE ENCOUNTER
RN returning patient's call. Pt reports the bottom half of vertical incision has \"opened\", reports first noticing it first happening Friday, 3/19/21. Pt reports leakage that appears to be blood-tinged.  Pt reports incision is approximately 1-2 inches dehi

## 2021-03-23 NOTE — TELEPHONE ENCOUNTER
SHEELA Cannon discussed with in office provider, [facility] pt's c/o wound dehisce. [facility] recommended pt go to ED and follow up with Dr Lina Briggs. RN contacted pt and informed of [facility]'s recommendation.  Pt agrees to go to ED but is unsure if able to follow up on Friday due

## 2021-03-23 NOTE — TELEPHONE ENCOUNTER
Reschedule post op appointment to 3/30/21. Patient reports that her vertical incision has split open on the bottom. Looks like it's just the skin and no infection.

## 2021-03-23 NOTE — ED NOTES
Assumed care of Jaydon Mazariegos upon arrival in room 54 via triage. Patient A&Ox4, see triage note and nursing assessment. Patient denies fever. Having normal BMs. No abd pain, tenderness or distention.  Abdominal incision superficially  as previously note

## 2021-03-23 NOTE — ED INITIAL ASSESSMENT (HPI)
Pt had Hysterectomy done 03/09/2021 , pt came in for recheck of incision site , per pt it is opening up. Pt denies pain. Denies fever. Per pt she has a schedule today to see her Gynecologist but they cancelled the appointment.

## 2021-03-23 NOTE — ED PROVIDER NOTES
Patient Seen in: [facility] AND [facility] Emergency Department      History   Patient presents with:  Wound Recheck    Stated Complaint: hysterectomy 03/09, incision opening up    HPI/Subjective:   HPI    82-year-old female presents the emergency department for Performed by Jonathan Bergeron MD at [address] [place] [address] MAIN OR                Social History    Tobacco Use      Smoking status: Former Smoker        Packs/day: 0.50        Years: 15.00        Pack years: 7.5      Smokeless tobacco: Never Used      Tobacco comment: Meghana Shaw supple. Skin:     General: Skin is warm and dry. Capillary Refill: Capillary refill takes less than 2 seconds. Neurological:      General: No focal deficit present.       Mental Status: Oren Hagen is alert and oriented to person, place, and t

## 2021-03-30 ENCOUNTER — OFFICE VISIT (OUTPATIENT)
Dept: OBGYN CLINIC | Facility: CLINIC | Age: 51
End: 2021-03-30
Payer: COMMERCIAL

## 2021-03-30 ENCOUNTER — TELEPHONE (OUTPATIENT)
Dept: OBGYN CLINIC | Facility: CLINIC | Age: 51
End: 2021-03-30

## 2021-03-30 DIAGNOSIS — Z98.890 POST-OPERATIVE STATE: Primary | ICD-10-CM

## 2021-03-30 PROBLEM — Z51.89 ENCOUNTER FOR WOUND RE-CHECK: Status: ACTIVE | Noted: 2021-03-30

## 2021-03-30 PROCEDURE — 99024 POSTOP FOLLOW-UP VISIT: CPT | Performed by: OBSTETRICS & GYNECOLOGY

## 2021-03-30 RX ORDER — FLUCONAZOLE 150 MG/1
150 TABLET ORAL DAILY
Qty: 2 TABLET | Refills: 0 | Status: SHIPPED | OUTPATIENT
Start: 2021-03-30 | End: 2021-04-01

## 2021-03-30 NOTE — TELEPHONE ENCOUNTER
Spoke to patient and informed her that her medication was sent to pharmacy. Patient verbalized understanding.

## 2021-03-30 NOTE — PROGRESS NOTES
Janice Dudley is a 52year old adult. HPI:   Patient presents with:  Surgical Followup: hysterectomy 03/09  Wound Recheck: pt was seen 3/23/21 @ [facility] for wound care.     Doing well - using wet to dry saline gauze to lower aspect of incision in pan

## 2021-03-30 NOTE — TELEPHONE ENCOUNTER
PT seen today and told she would get an Rx for yeast infection  PT was at pharmacy and there is nothing there.     [id number] Leslie Patel, [address] [facility] AT [address] [place] [address], [phone], [phone]

## 2021-06-18 ENCOUNTER — OFFICE VISIT (OUTPATIENT)
Dept: ORTHOPEDICS CLINIC | Facility: CLINIC | Age: 51
End: 2021-06-18
Payer: COMMERCIAL

## 2021-06-18 VITALS — HEART RATE: 111 BPM | DIASTOLIC BLOOD PRESSURE: 83 MMHG | SYSTOLIC BLOOD PRESSURE: 131 MMHG

## 2021-06-18 DIAGNOSIS — M17.0 BILATERAL PRIMARY OSTEOARTHRITIS OF KNEE: Primary | ICD-10-CM

## 2021-06-18 PROCEDURE — 3079F DIAST BP 80-89 MM HG: CPT | Performed by: ORTHOPAEDIC SURGERY

## 2021-06-18 PROCEDURE — 3075F SYST BP GE 130 - 139MM HG: CPT | Performed by: ORTHOPAEDIC SURGERY

## 2021-06-18 PROCEDURE — 20610 DRAIN/INJ JOINT/BURSA W/O US: CPT | Performed by: ORTHOPAEDIC SURGERY

## 2021-06-18 RX ORDER — TRIAMCINOLONE ACETONIDE 40 MG/ML
40 INJECTION, SUSPENSION INTRA-ARTICULAR; INTRAMUSCULAR ONCE
Status: COMPLETED | OUTPATIENT
Start: 2021-06-18 | End: 2021-06-18

## 2021-06-18 RX ADMIN — TRIAMCINOLONE ACETONIDE 40 MG: 40 INJECTION, SUSPENSION INTRA-ARTICULAR; INTRAMUSCULAR at 03:35:00

## 2021-06-18 NOTE — PROGRESS NOTES
NURSING INTAKE COMMENTS: Patient presents with:  Knee Pain: cortisone injection for bilateral knees, rates pain 3/10      HPI: This 52year old adult presents today with complaints of bilateral knee pain consistent with primary osteoarthritis.   She continu Years: 15.00        Pack years: 7.5      Smokeless tobacco: Never Used      Tobacco comment: quit 15 years ago    Vaping Use      Vaping Use: Never used    Substance and Sexual Activity      Alcohol use: Yes      Drug use: Never      Sexual activity: Not C

## 2021-06-18 NOTE — H&P
Per verbal order from Dr. Kobe Gutierrez, draw up 4ml of 1% lidocaine and 1ml of Kenalog 40 for cortisone injection to bilateral knee Particia AMY Law[place]    Patient provided education handout for cortisone injection.    Patient left office without obtaining post inject

## 2021-07-14 ENCOUNTER — LAB ENCOUNTER (OUTPATIENT)
Dept: LAB | Age: 51
End: 2021-07-14
Attending: FAMILY MEDICINE
Payer: COMMERCIAL

## 2021-07-14 ENCOUNTER — OFFICE VISIT (OUTPATIENT)
Dept: FAMILY MEDICINE CLINIC | Facility: CLINIC | Age: 51
End: 2021-07-14
Payer: COMMERCIAL

## 2021-07-14 VITALS
DIASTOLIC BLOOD PRESSURE: 82 MMHG | HEIGHT: 63 IN | TEMPERATURE: 98 F | WEIGHT: 246 LBS | BODY MASS INDEX: 43.59 KG/M2 | SYSTOLIC BLOOD PRESSURE: 152 MMHG | HEART RATE: 102 BPM

## 2021-07-14 DIAGNOSIS — Z00.00 ANNUAL PHYSICAL EXAM: Primary | ICD-10-CM

## 2021-07-14 DIAGNOSIS — Z00.00 ANNUAL PHYSICAL EXAM: ICD-10-CM

## 2021-07-14 LAB
ALBUMIN SERPL-MCNC: 4 G/DL (ref 3.4–5)
ALBUMIN/GLOB SERPL: 1.1 {RATIO} (ref 1–2)
ALP LIVER SERPL-CCNC: 71 U/L
ALT SERPL-CCNC: 27 U/L
ANION GAP SERPL CALC-SCNC: 8 MMOL/L (ref 0–18)
AST SERPL-CCNC: 14 U/L (ref 15–37)
BASOPHILS # BLD AUTO: 0.08 X10(3) UL (ref 0–0.2)
BASOPHILS NFR BLD AUTO: 1.2 %
BILIRUB SERPL-MCNC: 0.4 MG/DL (ref 0.1–2)
BUN BLD-MCNC: 13 MG/DL (ref 7–18)
BUN/CREAT SERPL: 18.1 (ref 10–20)
CALCIUM BLD-MCNC: 9.8 MG/DL (ref 8.5–10.1)
CHLORIDE SERPL-SCNC: 107 MMOL/L (ref 98–112)
CHOLEST SMN-MCNC: 352 MG/DL (ref ?–200)
CO2 SERPL-SCNC: 23 MMOL/L (ref 21–32)
CREAT BLD-MCNC: 0.72 MG/DL
DEPRECATED RDW RBC AUTO: 45.2 FL (ref 35.1–46.3)
EOSINOPHIL # BLD AUTO: 0.47 X10(3) UL (ref 0–0.7)
EOSINOPHIL NFR BLD AUTO: 6.9 %
ERYTHROCYTE [DISTWIDTH] IN BLOOD BY AUTOMATED COUNT: 13.8 % (ref 11–15)
GLOBULIN PLAS-MCNC: 3.7 G/DL (ref 2.8–4.4)
GLUCOSE BLD-MCNC: 126 MG/DL (ref 70–99)
HCT VFR BLD AUTO: 42.8 %
HDLC SERPL-MCNC: 45 MG/DL (ref 40–59)
HGB BLD-MCNC: 14.1 G/DL
IMM GRANULOCYTES # BLD AUTO: 0.01 X10(3) UL (ref 0–1)
IMM GRANULOCYTES NFR BLD: 0.1 %
LDLC SERPL CALC-MCNC: 239 MG/DL (ref ?–100)
LYMPHOCYTES # BLD AUTO: 2.3 X10(3) UL (ref 1–4)
LYMPHOCYTES NFR BLD AUTO: 33.6 %
M PROTEIN MFR SERPL ELPH: 7.7 G/DL (ref 6.4–8.2)
MCH RBC QN AUTO: 29.6 PG (ref 26–34)
MCHC RBC AUTO-ENTMCNC: 32.9 G/DL (ref 31–37)
MCV RBC AUTO: 89.7 FL
MONOCYTES # BLD AUTO: 0.4 X10(3) UL (ref 0.1–1)
MONOCYTES NFR BLD AUTO: 5.8 %
NEUTROPHILS # BLD AUTO: 3.59 X10 (3) UL (ref 1.5–7.7)
NEUTROPHILS # BLD AUTO: 3.59 X10(3) UL (ref 1.5–7.7)
NEUTROPHILS NFR BLD AUTO: 52.4 %
NONHDLC SERPL-MCNC: 307 MG/DL (ref ?–130)
OSMOLALITY SERPL CALC.SUM OF ELEC: 288 MOSM/KG (ref 275–295)
PATIENT FASTING Y/N/NP: NO
PATIENT FASTING Y/N/NP: NO
PLATELET # BLD AUTO: 276 10(3)UL (ref 150–450)
POTASSIUM SERPL-SCNC: 3.8 MMOL/L (ref 3.5–5.1)
RBC # BLD AUTO: 4.77 X10(6)UL
SODIUM SERPL-SCNC: 138 MMOL/L (ref 136–145)
TRIGL SERPL-MCNC: 318 MG/DL (ref 30–149)
TSI SER-ACNC: 1.66 MIU/ML (ref 0.36–3.74)
VLDLC SERPL CALC-MCNC: 76 MG/DL (ref 0–30)
WBC # BLD AUTO: 6.9 X10(3) UL (ref 4–11)

## 2021-07-14 PROCEDURE — 3079F DIAST BP 80-89 MM HG: CPT | Performed by: FAMILY MEDICINE

## 2021-07-14 PROCEDURE — 90715 TDAP VACCINE 7 YRS/> IM: CPT | Performed by: FAMILY MEDICINE

## 2021-07-14 PROCEDURE — 87591 N.GONORRHOEAE DNA AMP PROB: CPT

## 2021-07-14 PROCEDURE — 86780 TREPONEMA PALLIDUM: CPT

## 2021-07-14 PROCEDURE — 80053 COMPREHEN METABOLIC PANEL: CPT

## 2021-07-14 PROCEDURE — 84443 ASSAY THYROID STIM HORMONE: CPT

## 2021-07-14 PROCEDURE — 85025 COMPLETE CBC W/AUTO DIFF WBC: CPT

## 2021-07-14 PROCEDURE — 3008F BODY MASS INDEX DOCD: CPT | Performed by: FAMILY MEDICINE

## 2021-07-14 PROCEDURE — 90471 IMMUNIZATION ADMIN: CPT | Performed by: FAMILY MEDICINE

## 2021-07-14 PROCEDURE — 87389 HIV-1 AG W/HIV-1&-2 AB AG IA: CPT

## 2021-07-14 PROCEDURE — 99396 PREV VISIT EST AGE 40-64: CPT | Performed by: FAMILY MEDICINE

## 2021-07-14 PROCEDURE — 87491 CHLMYD TRACH DNA AMP PROBE: CPT

## 2021-07-14 PROCEDURE — 3077F SYST BP >= 140 MM HG: CPT | Performed by: FAMILY MEDICINE

## 2021-07-14 PROCEDURE — 80061 LIPID PANEL: CPT

## 2021-07-14 PROCEDURE — 36415 COLL VENOUS BLD VENIPUNCTURE: CPT

## 2021-07-14 NOTE — PROGRESS NOTES
HPI:    Miki Ho is a 52year old adult presents to clinic for annual physical exam.  No acute concerns. Recovering from hysterectomy earlier this year, feels better.   Patient suffers from anxiety/depression, triggered by the death of her siste today's visit):  Current Outpatient Medications   Medication Sig Dispense Refill   • ibuprofen 600 MG Oral Tab Take 1 tablet (600 mg total) by mouth every 6 (six) hours as needed for Pain.  60 tablet 0   • loratadine 10 MG Oral Tab Take 10 mg by mouth daily T4,         LIPID PANEL, HIV AG AB COMBO, T PALLIDUM         SCREENING CASCADE, CHLAMYDIA/GONOCOCCUS, JAYDEN  -Blood pressure elevated, patient asymptomatic.   Did improve on second reading, will recheck in 4 weeks.  -Tdap given, otherwise up-to-date  - safe s

## 2021-07-15 LAB
C TRACH DNA SPEC QL NAA+PROBE: NEGATIVE
N GONORRHOEA DNA SPEC QL NAA+PROBE: NEGATIVE

## 2021-07-16 LAB — T PALLIDUM AB SER QL: NEGATIVE

## 2021-07-24 ENCOUNTER — PATIENT MESSAGE (OUTPATIENT)
Dept: FAMILY MEDICINE CLINIC | Facility: CLINIC | Age: 51
End: 2021-07-24

## 2021-07-24 NOTE — TELEPHONE ENCOUNTER
From: Miki Ho  To: Radha Ruiz MD  Sent: 7/24/2021 9:22 AM CDT  Subject: Visit Follow-up Question    Good Morning,    In the post visit notes, you wrote, \"cholesterol levels are pretty elevated.  In addition to lifestyle changes-a low-fat diet

## 2021-07-26 RX ORDER — ATORVASTATIN CALCIUM 20 MG/1
20 TABLET, FILM COATED ORAL NIGHTLY
Qty: 90 TABLET | Refills: 0 | Status: SHIPPED | OUTPATIENT
Start: 2021-07-26 | End: 2022-01-09

## 2021-07-26 NOTE — TELEPHONE ENCOUNTER
Atorvastatin to pharmacy, diet information sent over my chart.   Follow-up with me in 6 months, at like patient to be fasting for blood work

## 2021-07-26 NOTE — PATIENT INSTRUCTIONS
Understanding Fat and Cholesterol  Too much cholesterol in your blood can lead to problems such as blocked arteries. This can lead to heart attack and stroke.  One of the best ways to manage heart and blood vessel disease is to lower your blood cholestero spreads are now made with these oils, too. Avocados are also high in monounsaturated fat.  Of all fats, monounsaturated fats are the least harmful to your heart. Don't eat trans fats  Like saturated fats, trans fats have been linked to heart disease.  Even 4589-5849 [facility] 4037. All rights reserved. This information is not intended as a substitute for professional medical care. Always follow your healthcare professional's instructions.         Low-Fat Cooking Tips  To eat less fat, you may need substitute for professional medical care. Always follow your healthcare professional's instructions. Low-Cholesterol Diet   Your body needs cholesterol to build new cells and create certain hormones.  There are 2 kinds of cholesterol in your blood: high cholesterol. The tips below will help you create healthy eating habits that will help lower your blood cholesterol level.   Create a diet high in good fats, low in bad fats (and low in cholesterol)  The following steps will help you create a diet high Eating Heart-Healthy Foods  Eating has a big impact on your heart health. In fact, eating healthier can improve several of your heart risks at once. For instance, it helps you manage weight, cholesterol, and blood pressure.  Here are ideas to help you foods and getting regular exercise. To help you track your progress, keep a diary to record what you eat and how often you exercise. Choose the right foods  Aim to make these foods staples of your diet.  If you have diabetes, you may have different recomme down on added fat, sugar and salt. Look on the internet for lower-fat, lower-sodium recipes without a lot of added sugars. Also try these tips:   · Remove fat from meat and skin from poultry before cooking. · Skim fat from the surface of soups and sauces. trans fats because they lower good cholesterol as well as raise bad cholesterol. Trans fats are most often found in processed foods, such as pastries, cookies, pies, muffins, fried foods, stick margarines, and shortening.   · Reduce how much sodium (salt) y dairy provide nutrients without a lot of fat. Try low-fat or nonfat milk, cheese, or yogurt. · Healthy fats can be good for you in small amounts. These are unsaturated fats, such as olive oil, nuts, and fish.  Try to have at least 2 servings per week of fa impact on your heart health. In fact, eating healthier can improve several of your heart risks at once. For instance, it helps you manage weight, cholesterol, and blood pressure.  Here are ideas to help you make heart-healthy changes without giving up all t track your progress, keep a diary to record what you eat and how often you exercise. Choose the right foods  Aim to make these foods staples of your diet.  If you have diabetes, you may have different recommendations than what is listed here:   · Fruits an internet for lower-fat, lower-sodium recipes without a lot of added sugars. Also try these tips:   · Remove fat from meat and skin from poultry before cooking. · Skim fat from the surface of soups and sauces.   · Broil, roast, boil, bake, steam, grill, or recipes  · In soups and sauces: Replace whole milk or cream with low-fat milk, evaporated fat-free milk, or nonfat dry milk. · In puddings and other desserts: Replace whole milk or cream with low-fat milk or fat-free condensed milk.   · To make dips and to saturated fat.  Below are some examples of foods that contain a lot of saturated fat:   · Fatty cuts of meat (lamb, ham, beef)  · Many pastries, cakes, cookies, and candies  · Cream, ice cream, sour cream, cheese, and butter, and foods made with them  · Shameka you’re eating. Reading food labels helps you make healthy choices. Look for the words highlighted below. · Serving Size. This is the amount of food in 1 serving.  If you eat larger portions, be sure to count more of everything: fat, calories, and choleste

## 2021-08-16 ENCOUNTER — HOSPITAL ENCOUNTER (OUTPATIENT)
Dept: MAMMOGRAPHY | Facility: HOSPITAL | Age: 51
Discharge: HOME | End: 2021-08-16
Attending: OBSTETRICS & GYNECOLOGY
Payer: COMMERCIAL

## 2021-08-16 DIAGNOSIS — Z12.31 BREAST CANCER SCREENING BY MAMMOGRAM: ICD-10-CM

## 2021-08-16 PROCEDURE — 77063 BREAST TOMOSYNTHESIS BI: CPT | Performed by: OBSTETRICS & GYNECOLOGY

## 2021-08-16 PROCEDURE — 77067 SCR MAMMO BI INCL CAD: CPT | Performed by: OBSTETRICS & GYNECOLOGY

## 2021-09-28 ENCOUNTER — HOSPITAL ENCOUNTER (OUTPATIENT)
Dept: MAMMOGRAPHY | Facility: HOSPITAL | Age: 51
Discharge: HOME | End: 2021-09-28
Attending: OBSTETRICS & GYNECOLOGY
Payer: COMMERCIAL

## 2021-09-28 DIAGNOSIS — R92.8 ABNORMAL MAMMOGRAM: ICD-10-CM

## 2021-09-28 PROCEDURE — 77065 DX MAMMO INCL CAD UNI: CPT | Performed by: OBSTETRICS & GYNECOLOGY

## 2021-09-28 PROCEDURE — 77061 BREAST TOMOSYNTHESIS UNI: CPT | Performed by: OBSTETRICS & GYNECOLOGY

## 2022-01-10 RX ORDER — ATORVASTATIN CALCIUM 20 MG/1
20 TABLET, FILM COATED ORAL NIGHTLY
Qty: 90 TABLET | Refills: 0 | Status: SHIPPED | OUTPATIENT
Start: 2022-01-10

## 2022-07-27 ENCOUNTER — OFFICE VISIT (OUTPATIENT)
Dept: FAMILY MEDICINE CLINIC | Facility: CLINIC | Age: 52
End: 2022-07-27
Payer: COMMERCIAL

## 2022-07-27 ENCOUNTER — LAB ENCOUNTER (OUTPATIENT)
Dept: LAB | Age: 52
End: 2022-07-27
Attending: FAMILY MEDICINE
Payer: COMMERCIAL

## 2022-07-27 VITALS
OXYGEN SATURATION: 98 % | HEART RATE: 106 BPM | WEIGHT: 262 LBS | RESPIRATION RATE: 18 BRPM | DIASTOLIC BLOOD PRESSURE: 102 MMHG | HEIGHT: 63 IN | SYSTOLIC BLOOD PRESSURE: 141 MMHG | BODY MASS INDEX: 46.42 KG/M2

## 2022-07-27 DIAGNOSIS — M25.561 BILATERAL CHRONIC KNEE PAIN: Primary | ICD-10-CM

## 2022-07-27 DIAGNOSIS — G89.29 BILATERAL CHRONIC KNEE PAIN: Primary | ICD-10-CM

## 2022-07-27 DIAGNOSIS — Z90.710 S/P ABDOMINAL HYSTERECTOMY: ICD-10-CM

## 2022-07-27 DIAGNOSIS — G43.109 MIGRAINE WITH AURA AND WITHOUT STATUS MIGRAINOSUS, NOT INTRACTABLE: ICD-10-CM

## 2022-07-27 DIAGNOSIS — R10.9 ABDOMINAL PAIN, UNSPECIFIED ABDOMINAL LOCATION: ICD-10-CM

## 2022-07-27 DIAGNOSIS — E78.5 HYPERLIPIDEMIA, UNSPECIFIED HYPERLIPIDEMIA TYPE: ICD-10-CM

## 2022-07-27 DIAGNOSIS — Z12.11 SCREENING FOR COLON CANCER: ICD-10-CM

## 2022-07-27 DIAGNOSIS — M25.562 BILATERAL CHRONIC KNEE PAIN: Primary | ICD-10-CM

## 2022-07-27 DIAGNOSIS — I10 ESSENTIAL HYPERTENSION: ICD-10-CM

## 2022-07-27 LAB
ALBUMIN SERPL-MCNC: 4 G/DL (ref 3.4–5)
ALBUMIN/GLOB SERPL: 1.1 {RATIO} (ref 1–2)
ALP LIVER SERPL-CCNC: 88 U/L
ALT SERPL-CCNC: 38 U/L
ANION GAP SERPL CALC-SCNC: 12 MMOL/L (ref 0–18)
AST SERPL-CCNC: 18 U/L (ref 15–37)
BILIRUB SERPL-MCNC: 0.5 MG/DL (ref 0.1–2)
BUN BLD-MCNC: 12 MG/DL (ref 7–18)
BUN/CREAT SERPL: 16.9 (ref 10–20)
CALCIUM BLD-MCNC: 9.9 MG/DL (ref 8.5–10.1)
CHLORIDE SERPL-SCNC: 108 MMOL/L (ref 98–112)
CHOLEST SERPL-MCNC: 303 MG/DL (ref ?–200)
CO2 SERPL-SCNC: 21 MMOL/L (ref 21–32)
CREAT BLD-MCNC: 0.71 MG/DL
FASTING PATIENT LIPID ANSWER: YES
FASTING STATUS PATIENT QL REPORTED: YES
GLOBULIN PLAS-MCNC: 3.7 G/DL (ref 2.8–4.4)
GLUCOSE BLD-MCNC: 118 MG/DL (ref 70–99)
HDLC SERPL-MCNC: 45 MG/DL (ref 40–59)
LDLC SERPL CALC-MCNC: 223 MG/DL (ref ?–100)
NONHDLC SERPL-MCNC: 258 MG/DL (ref ?–130)
OSMOLALITY SERPL CALC.SUM OF ELEC: 293 MOSM/KG (ref 275–295)
POTASSIUM SERPL-SCNC: 4.1 MMOL/L (ref 3.5–5.1)
PROT SERPL-MCNC: 7.7 G/DL (ref 6.4–8.2)
SODIUM SERPL-SCNC: 141 MMOL/L (ref 136–145)
TRIGL SERPL-MCNC: 180 MG/DL (ref 30–149)
VLDLC SERPL CALC-MCNC: 41 MG/DL (ref 0–30)

## 2022-07-27 PROCEDURE — 80061 LIPID PANEL: CPT

## 2022-07-27 PROCEDURE — 36415 COLL VENOUS BLD VENIPUNCTURE: CPT

## 2022-07-27 PROCEDURE — 3080F DIAST BP >= 90 MM HG: CPT | Performed by: FAMILY MEDICINE

## 2022-07-27 PROCEDURE — 99215 OFFICE O/P EST HI 40 MIN: CPT | Performed by: FAMILY MEDICINE

## 2022-07-27 PROCEDURE — 80053 COMPREHEN METABOLIC PANEL: CPT

## 2022-07-27 PROCEDURE — 3008F BODY MASS INDEX DOCD: CPT | Performed by: FAMILY MEDICINE

## 2022-07-27 PROCEDURE — 3077F SYST BP >= 140 MM HG: CPT | Performed by: FAMILY MEDICINE

## 2022-07-27 RX ORDER — ATORVASTATIN CALCIUM 20 MG/1
20 TABLET, FILM COATED ORAL NIGHTLY
Qty: 90 TABLET | Refills: 0 | Status: SHIPPED | OUTPATIENT
Start: 2022-07-27

## 2022-07-27 RX ORDER — MELATONIN 10 MG
CAPSULE ORAL
COMMUNITY

## 2022-07-27 RX ORDER — AMLODIPINE BESYLATE 5 MG/1
5 TABLET ORAL DAILY
Qty: 90 TABLET | Refills: 0 | Status: SHIPPED | OUTPATIENT
Start: 2022-07-27

## 2022-08-08 ENCOUNTER — TELEPHONE (OUTPATIENT)
Dept: FAMILY MEDICINE CLINIC | Facility: CLINIC | Age: 52
End: 2022-08-08

## 2022-08-08 ENCOUNTER — NURSE ONLY (OUTPATIENT)
Dept: GASTROENTEROLOGY | Facility: CLINIC | Age: 52
End: 2022-08-08

## 2022-08-08 DIAGNOSIS — Z12.11 SCREEN FOR COLON CANCER: Primary | ICD-10-CM

## 2022-08-08 NOTE — TELEPHONE ENCOUNTER
[facility] has denied the request for pt's CT ABD/PELVIS. If you would like a P2P, please call [facility] @ [phone]. Pt's ID # [id number]. The following is AIM's reasoning for the denial:    ReasonDescription: Your request has been denied by the benefit manager. ,BenefitManagerNotes: Your doctor is checking you for a widening of the largest blood vessel. Your doctor ordered a CT scan of your abdomen and pelvis. A CT is a way to take pictures of the inside of your body. This test is needed when ultrasound results are unclear. We reviewed the notes we have. The notes do not show that you had an ultrasound. Based on the information we have, this test is not medically necessary for you. We used [facility] Guideline titled Vascular Imaging to make this decision. You may view this guideline at [email or web address].

## 2022-08-08 NOTE — PROGRESS NOTES
Dr. Shawnee Marr,     Called patient for a scheduled telephone colon screening. GI MD preference: none  Insurance:  BCBS  CBC from: 7/14/2021  PCP visit on: 7/27/2022    First colonoscopy: yes     Anticoagulants: no   Diabetic Meds:  No   BP meds(Ace inhibitors/ARB's): no  Weight loss meds (phentermine/vyvanse): no    Iron supplement (RX/OTC): Yes  Height & Weight/BMI: 5'3\"/262lbs/46   Hx of Cardiac/CVA issues/(MI/Stroke): no   Devices Pacemaker/Defibrillator/Stents: no   Resp. Issues/Oxygen Use/SADIE/COPD: no   Issues w/Anesthesia: no     Symptoms (Y/N): abd. Pain   Symptoms Details: once a week  PCP has ordered CT A/P w/contrst; scheduled on 8/20/2022    Family history of colon cancer:  No     Medications, pharmacy, and allergies verified with patient over the phone. Please advise on orders and prep, thank you.

## 2022-08-09 NOTE — TELEPHONE ENCOUNTER
Please give patient information to schedule with Dr. Gabriela Marcus, general surgery. He can evaluate her first and order imaging if necessary.

## 2022-08-15 NOTE — PROGRESS NOTES
Scheduled for:  Colonoscopy-screen 43166    Provider Name:  Dr. Erum Cheema  Date:  12/5/2022  Location:  [facility]  Sedation:  MAC  Time:  10:45 am (pt is aware to arrive at 9:45 am)  Prep:  Split dose golytely   Meds/Allergies Reconciled?: Physician reviewed   Diagnosis with codes:  Colorectal cancer screening Z12.11  Was patient informed to call insurance with codes (Y/N): I confirmed [employer] with this patient. Referral sent?:  Referral was sent at the time of electronic surgical scheduling. [address] [facility] or [facility]  notified?:  I sent an electronic request to Endo Scheduling and received a confirmation today. Medication Orders:  DO NOT TAKE: Iron pills, herbal supplements, multi-vitamins, or diet medications (i.e. Phentermine/Vyvanse) for 7 days before exam.  Misc Orders:  Patient was informed that they will need a COVID 19 test prior to their procedure. Patient verbally understood & will await a phone call from [facility] to schedule. Further instructions given by staff:  I discussed the prep instructions with the patient which she verbally understood and is aware that I will send the instructions today via [address]

## 2022-08-15 NOTE — PROGRESS NOTES
Dx: average risk crc screening  Colonoscopy with MAC sedation  Split dose golytely preparation, sent to pharmacy  Please review prep instructions with patient    - HOLD ACE/ARBs the night before and/or the day of the procedure(s) -   - NO herbal supplements or weight loss medications x 7 days prior to the procedure(s)

## 2022-08-17 ENCOUNTER — TELEPHONE (OUTPATIENT)
Dept: FAMILY MEDICINE CLINIC | Facility: CLINIC | Age: 52
End: 2022-08-17

## 2022-08-17 NOTE — TELEPHONE ENCOUNTER
Patient inquiring if follow-up scheduled on 8/24 should be kept since insurance denied CT scan and appointment with Dr. Josep Torre is not until 8/29, or if doctor would like patient to keep appointment to discuss blood pressure medication.

## 2022-08-24 ENCOUNTER — OFFICE VISIT (OUTPATIENT)
Dept: ORTHOPEDICS CLINIC | Facility: CLINIC | Age: 52
End: 2022-08-24
Payer: COMMERCIAL

## 2022-08-24 ENCOUNTER — HOSPITAL ENCOUNTER (OUTPATIENT)
Dept: GENERAL RADIOLOGY | Facility: HOSPITAL | Age: 52
Discharge: HOME | End: 2022-08-24
Attending: ORTHOPAEDIC SURGERY
Payer: COMMERCIAL

## 2022-08-24 ENCOUNTER — OFFICE VISIT (OUTPATIENT)
Dept: FAMILY MEDICINE CLINIC | Facility: CLINIC | Age: 52
End: 2022-08-24
Payer: COMMERCIAL

## 2022-08-24 VITALS
WEIGHT: 261 LBS | OXYGEN SATURATION: 99 % | DIASTOLIC BLOOD PRESSURE: 76 MMHG | BODY MASS INDEX: 46.25 KG/M2 | RESPIRATION RATE: 19 BRPM | SYSTOLIC BLOOD PRESSURE: 131 MMHG | HEIGHT: 63 IN | HEART RATE: 70 BPM

## 2022-08-24 DIAGNOSIS — R52 PAIN: ICD-10-CM

## 2022-08-24 DIAGNOSIS — E66.01 MORBID OBESITY WITH BMI OF 45.0-49.9, ADULT (HCC): ICD-10-CM

## 2022-08-24 DIAGNOSIS — M17.0 PRIMARY OSTEOARTHRITIS OF BOTH KNEES: ICD-10-CM

## 2022-08-24 DIAGNOSIS — R52 PAIN: Primary | ICD-10-CM

## 2022-08-24 DIAGNOSIS — I10 ESSENTIAL HYPERTENSION: Primary | ICD-10-CM

## 2022-08-24 DIAGNOSIS — E66.01 CLASS 3 SEVERE OBESITY WITHOUT SERIOUS COMORBIDITY WITH BODY MASS INDEX (BMI) OF 45.0 TO 49.9 IN ADULT, UNSPECIFIED OBESITY TYPE (HCC): ICD-10-CM

## 2022-08-24 PROCEDURE — 3008F BODY MASS INDEX DOCD: CPT | Performed by: FAMILY MEDICINE

## 2022-08-24 PROCEDURE — 99214 OFFICE O/P EST MOD 30 MIN: CPT | Performed by: FAMILY MEDICINE

## 2022-08-24 PROCEDURE — 3075F SYST BP GE 130 - 139MM HG: CPT | Performed by: FAMILY MEDICINE

## 2022-08-24 PROCEDURE — 73562 X-RAY EXAM OF KNEE 3: CPT | Performed by: ORTHOPAEDIC SURGERY

## 2022-08-24 PROCEDURE — 99214 OFFICE O/P EST MOD 30 MIN: CPT | Performed by: ORTHOPAEDIC SURGERY

## 2022-08-24 PROCEDURE — 3078F DIAST BP <80 MM HG: CPT | Performed by: FAMILY MEDICINE

## 2022-08-24 RX ORDER — LISINOPRIL 10 MG/1
10 TABLET ORAL DAILY
Qty: 90 TABLET | Refills: 0 | Status: SHIPPED | OUTPATIENT
Start: 2022-08-24

## 2022-08-24 RX ORDER — MELOXICAM 15 MG/1
15 TABLET ORAL DAILY
Qty: 30 TABLET | Refills: 0 | Status: SHIPPED | OUTPATIENT
Start: 2022-08-24 | End: 2022-08-24 | Stop reason: ALTCHOICE

## 2022-08-29 ENCOUNTER — OFFICE VISIT (OUTPATIENT)
Dept: SURGERY | Facility: CLINIC | Age: 52
End: 2022-08-29
Payer: COMMERCIAL

## 2022-08-29 VITALS — BODY MASS INDEX: 46.25 KG/M2 | WEIGHT: 261 LBS | HEIGHT: 63 IN

## 2022-08-29 DIAGNOSIS — K43.2 INCISIONAL HERNIA, WITHOUT OBSTRUCTION OR GANGRENE: Primary | ICD-10-CM

## 2022-08-29 PROCEDURE — 3008F BODY MASS INDEX DOCD: CPT | Performed by: SURGERY

## 2022-08-29 PROCEDURE — 99204 OFFICE O/P NEW MOD 45 MIN: CPT | Performed by: SURGERY

## 2022-08-29 NOTE — PATIENT INSTRUCTIONS
Obtain preoperative testing. Plan laparoscopic repair of incisional hernia with mesh at [facility].  Same-day surgery will call the day before with instructions. Avoid aspirin and NSAIDs for 5 days prior to surgery.

## 2022-09-12 ENCOUNTER — TELEPHONE (OUTPATIENT)
Dept: SURGERY | Facility: CLINIC | Age: 52
End: 2022-09-12

## 2022-09-12 NOTE — TELEPHONE ENCOUNTER
[facility] [facility] [address] [facility]. Scanned, took orig to Forms.

## 2022-09-14 ENCOUNTER — TELEPHONE (OUTPATIENT)
Dept: SURGERY | Facility: CLINIC | Age: 52
End: 2022-09-14

## 2022-09-14 NOTE — TELEPHONE ENCOUNTER
Dr. Derek Cho,     Please sign off on form: Fmla  -Highlight the patient and hit \"Chart\" button. -In Chart Review, w/in the Encounter tab - click 1 time on the Telephone call encounter for 9/12/22 Scroll down the telephone encounter.  -Click \"scan on\" blue Hyperlink under \"Media\" heading for Fmla Dr Derek Cho 9/14/22 w/in the telephone enc.  -Click on Acknowledge button at the bottom right corner and left-click onto image, signature stamp appears and drag signature to Provider signature line. Stamp will turn blue. Close window. Thank you,    Dane Payne.

## 2022-09-15 ENCOUNTER — LAB ENCOUNTER (OUTPATIENT)
Dept: LAB | Facility: HOSPITAL | Age: 52
End: 2022-09-15
Attending: SURGERY
Payer: COMMERCIAL

## 2022-09-15 DIAGNOSIS — K43.2 INCISIONAL HERNIA, WITHOUT OBSTRUCTION OR GANGRENE: ICD-10-CM

## 2022-09-15 LAB
BASOPHILS # BLD AUTO: 0.1 X10(3) UL (ref 0–0.2)
BASOPHILS NFR BLD AUTO: 1.3 %
DEPRECATED RDW RBC AUTO: 42.2 FL (ref 35.1–46.3)
EOSINOPHIL # BLD AUTO: 0.36 X10(3) UL (ref 0–0.7)
EOSINOPHIL NFR BLD AUTO: 4.8 %
ERYTHROCYTE [DISTWIDTH] IN BLOOD BY AUTOMATED COUNT: 12.4 % (ref 11–15)
HCT VFR BLD AUTO: 44.8 %
HGB BLD-MCNC: 15.2 G/DL
IMM GRANULOCYTES # BLD AUTO: 0.02 X10(3) UL (ref 0–1)
IMM GRANULOCYTES NFR BLD: 0.3 %
LYMPHOCYTES # BLD AUTO: 2.13 X10(3) UL (ref 1–4)
LYMPHOCYTES NFR BLD AUTO: 28.1 %
MCH RBC QN AUTO: 31.3 PG (ref 26–34)
MCHC RBC AUTO-ENTMCNC: 33.9 G/DL (ref 31–37)
MCV RBC AUTO: 92.2 FL
MONOCYTES # BLD AUTO: 0.49 X10(3) UL (ref 0.1–1)
MONOCYTES NFR BLD AUTO: 6.5 %
NEUTROPHILS # BLD AUTO: 4.47 X10 (3) UL (ref 1.5–7.7)
NEUTROPHILS # BLD AUTO: 4.47 X10(3) UL (ref 1.5–7.7)
NEUTROPHILS NFR BLD AUTO: 59 %
PLATELET # BLD AUTO: 259 10(3)UL (ref 150–450)
RBC # BLD AUTO: 4.86 X10(6)UL
WBC # BLD AUTO: 7.6 X10(3) UL (ref 4–11)

## 2022-09-15 PROCEDURE — 85025 COMPLETE CBC W/AUTO DIFF WBC: CPT

## 2022-09-15 PROCEDURE — 36415 COLL VENOUS BLD VENIPUNCTURE: CPT

## 2022-09-15 PROCEDURE — 93005 ELECTROCARDIOGRAM TRACING: CPT

## 2022-09-15 PROCEDURE — 93010 ELECTROCARDIOGRAM REPORT: CPT | Performed by: SURGERY

## 2022-09-17 RX ORDER — MELOXICAM 15 MG/1
TABLET ORAL
COMMUNITY
Start: 2022-08-24 | End: 2022-09-22

## 2022-09-20 ENCOUNTER — LAB ENCOUNTER (OUTPATIENT)
Dept: LAB | Facility: HOSPITAL | Age: 52
End: 2022-09-20
Attending: SURGERY

## 2022-09-20 DIAGNOSIS — Z01.818 PREOP TESTING: ICD-10-CM

## 2022-09-20 LAB — SARS-COV-2 RNA RESP QL NAA+PROBE: NOT DETECTED

## 2022-09-22 ENCOUNTER — ANESTHESIA (OUTPATIENT)
Dept: SURGERY | Facility: HOSPITAL | Age: 52
End: 2022-09-22

## 2022-09-22 ENCOUNTER — HOSPITAL ENCOUNTER (OUTPATIENT)
Facility: HOSPITAL | Age: 52
Setting detail: HOSPITAL OUTPATIENT SURGERY
Discharge: HOME | End: 2022-09-22
Attending: SURGERY | Admitting: SURGERY

## 2022-09-22 ENCOUNTER — APPOINTMENT (OUTPATIENT)
Dept: GENERAL RADIOLOGY | Facility: HOSPITAL | Age: 52
End: 2022-09-22
Attending: INTERNAL MEDICINE

## 2022-09-22 ENCOUNTER — ANESTHESIA EVENT (OUTPATIENT)
Dept: SURGERY | Facility: HOSPITAL | Age: 52
End: 2022-09-22

## 2022-09-22 VITALS
DIASTOLIC BLOOD PRESSURE: 63 MMHG | BODY MASS INDEX: 46.6 KG/M2 | TEMPERATURE: 98 F | SYSTOLIC BLOOD PRESSURE: 109 MMHG | WEIGHT: 263 LBS | HEART RATE: 80 BPM | RESPIRATION RATE: 15 BRPM | HEIGHT: 63 IN | OXYGEN SATURATION: 95 %

## 2022-09-22 DIAGNOSIS — Z01.818 PREOP TESTING: Primary | ICD-10-CM

## 2022-09-22 DIAGNOSIS — K43.2 INCISIONAL HERNIA, WITHOUT OBSTRUCTION OR GANGRENE: ICD-10-CM

## 2022-09-22 PROCEDURE — 0WUF4JZ SUPPLEMENT ABDOMINAL WALL WITH SYNTHETIC SUBSTITUTE, PERCUTANEOUS ENDOSCOPIC APPROACH: ICD-10-PCS | Performed by: SURGERY

## 2022-09-22 PROCEDURE — 0DNU4ZZ RELEASE OMENTUM, PERCUTANEOUS ENDOSCOPIC APPROACH: ICD-10-PCS | Performed by: SURGERY

## 2022-09-22 PROCEDURE — 0WUF0JZ SUPPLEMENT ABDOMINAL WALL WITH SYNTHETIC SUBSTITUTE, OPEN APPROACH: ICD-10-PCS | Performed by: SURGERY

## 2022-09-22 PROCEDURE — 74019 RADEX ABDOMEN 2 VIEWS: CPT | Performed by: INTERNAL MEDICINE

## 2022-09-22 PROCEDURE — S0077 INJECTION, CLINDAMYCIN PHOSP: HCPCS | Performed by: ANESTHESIOLOGY

## 2022-09-22 PROCEDURE — 49561 REPAIR INCISIONAL HERNIA,STRANG: CPT | Performed by: SURGERY

## 2022-09-22 PROCEDURE — 49568 REPAIR INCIS HERNIA W MESH: CPT | Performed by: SURGERY

## 2022-09-22 DEVICE — VENTRALIGHT ST MESH WITH ECHO PS POSITIONING SYSTEM
Type: IMPLANTABLE DEVICE | Site: ABDOMEN | Status: FUNCTIONAL
Brand: VENTRALIGHT ST MESH WITH ECHO PS POSITIONING SYSTEM

## 2022-09-22 RX ORDER — SODIUM CHLORIDE, SODIUM LACTATE, POTASSIUM CHLORIDE, CALCIUM CHLORIDE 600; 310; 30; 20 MG/100ML; MG/100ML; MG/100ML; MG/100ML
INJECTION, SOLUTION INTRAVENOUS CONTINUOUS
Status: DISCONTINUED | OUTPATIENT
Start: 2022-09-22 | End: 2022-09-22

## 2022-09-22 RX ORDER — LIDOCAINE HYDROCHLORIDE 10 MG/ML
INJECTION, SOLUTION EPIDURAL; INFILTRATION; INTRACAUDAL; PERINEURAL AS NEEDED
Status: DISCONTINUED | OUTPATIENT
Start: 2022-09-22 | End: 2022-09-22 | Stop reason: SURG

## 2022-09-22 RX ORDER — HYDROMORPHONE HYDROCHLORIDE 1 MG/ML
0.4 INJECTION, SOLUTION INTRAMUSCULAR; INTRAVENOUS; SUBCUTANEOUS EVERY 5 MIN PRN
Status: DISCONTINUED | OUTPATIENT
Start: 2022-09-22 | End: 2022-09-22

## 2022-09-22 RX ORDER — PROCHLORPERAZINE EDISYLATE 5 MG/ML
5 INJECTION INTRAMUSCULAR; INTRAVENOUS EVERY 8 HOURS PRN
Status: DISCONTINUED | OUTPATIENT
Start: 2022-09-22 | End: 2022-09-22

## 2022-09-22 RX ORDER — FAMOTIDINE 20 MG/1
20 TABLET, FILM COATED ORAL ONCE
Status: COMPLETED | OUTPATIENT
Start: 2022-09-22 | End: 2022-09-22

## 2022-09-22 RX ORDER — MORPHINE SULFATE 4 MG/ML
2 INJECTION, SOLUTION INTRAMUSCULAR; INTRAVENOUS EVERY 10 MIN PRN
Status: DISCONTINUED | OUTPATIENT
Start: 2022-09-22 | End: 2022-09-22

## 2022-09-22 RX ORDER — MORPHINE SULFATE 10 MG/ML
6 INJECTION, SOLUTION INTRAMUSCULAR; INTRAVENOUS EVERY 10 MIN PRN
Status: DISCONTINUED | OUTPATIENT
Start: 2022-09-22 | End: 2022-09-22

## 2022-09-22 RX ORDER — ONDANSETRON 2 MG/ML
INJECTION INTRAMUSCULAR; INTRAVENOUS AS NEEDED
Status: DISCONTINUED | OUTPATIENT
Start: 2022-09-22 | End: 2022-09-22 | Stop reason: SURG

## 2022-09-22 RX ORDER — METOCLOPRAMIDE 10 MG/1
10 TABLET ORAL ONCE
Status: COMPLETED | OUTPATIENT
Start: 2022-09-22 | End: 2022-09-22

## 2022-09-22 RX ORDER — ONDANSETRON 2 MG/ML
4 INJECTION INTRAMUSCULAR; INTRAVENOUS EVERY 6 HOURS PRN
Status: DISCONTINUED | OUTPATIENT
Start: 2022-09-22 | End: 2022-09-22

## 2022-09-22 RX ORDER — DIPHENHYDRAMINE HYDROCHLORIDE 50 MG/ML
INJECTION INTRAMUSCULAR; INTRAVENOUS AS NEEDED
Status: DISCONTINUED | OUTPATIENT
Start: 2022-09-22 | End: 2022-09-22 | Stop reason: SURG

## 2022-09-22 RX ORDER — NEOSTIGMINE METHYLSULFATE 1 MG/ML
INJECTION, SOLUTION INTRAVENOUS AS NEEDED
Status: DISCONTINUED | OUTPATIENT
Start: 2022-09-22 | End: 2022-09-22 | Stop reason: SURG

## 2022-09-22 RX ORDER — HYDROCODONE BITARTRATE AND ACETAMINOPHEN 10; 325 MG/1; MG/1
1 TABLET ORAL EVERY 4 HOURS PRN
Status: DISCONTINUED | OUTPATIENT
Start: 2022-09-22 | End: 2022-09-22

## 2022-09-22 RX ORDER — ROCURONIUM BROMIDE 10 MG/ML
INJECTION, SOLUTION INTRAVENOUS AS NEEDED
Status: DISCONTINUED | OUTPATIENT
Start: 2022-09-22 | End: 2022-09-22 | Stop reason: SURG

## 2022-09-22 RX ORDER — LABETALOL HYDROCHLORIDE 5 MG/ML
10 INJECTION, SOLUTION INTRAVENOUS ONCE
Status: DISCONTINUED | OUTPATIENT
Start: 2022-09-22 | End: 2022-09-22

## 2022-09-22 RX ORDER — HYDROMORPHONE HYDROCHLORIDE 1 MG/ML
0.2 INJECTION, SOLUTION INTRAMUSCULAR; INTRAVENOUS; SUBCUTANEOUS EVERY 5 MIN PRN
Status: DISCONTINUED | OUTPATIENT
Start: 2022-09-22 | End: 2022-09-22

## 2022-09-22 RX ORDER — IBUPROFEN 800 MG/1
800 TABLET ORAL EVERY 8 HOURS PRN
Qty: 20 TABLET | Refills: 0 | Status: SHIPPED | OUTPATIENT
Start: 2022-09-22

## 2022-09-22 RX ORDER — BUPIVACAINE HYDROCHLORIDE AND EPINEPHRINE 2.5; 5 MG/ML; UG/ML
INJECTION, SOLUTION INFILTRATION; PERINEURAL AS NEEDED
Status: DISCONTINUED | OUTPATIENT
Start: 2022-09-22 | End: 2022-09-22 | Stop reason: HOSPADM

## 2022-09-22 RX ORDER — DEXAMETHASONE SODIUM PHOSPHATE 4 MG/ML
VIAL (ML) INJECTION AS NEEDED
Status: DISCONTINUED | OUTPATIENT
Start: 2022-09-22 | End: 2022-09-22 | Stop reason: SURG

## 2022-09-22 RX ORDER — CLINDAMYCIN PHOSPHATE 150 MG/ML
INJECTION, SOLUTION INTRAVENOUS AS NEEDED
Status: DISCONTINUED | OUTPATIENT
Start: 2022-09-22 | End: 2022-09-22 | Stop reason: SURG

## 2022-09-22 RX ORDER — HYDROCODONE BITARTRATE AND ACETAMINOPHEN 10; 325 MG/1; MG/1
1 TABLET ORAL EVERY 4 HOURS PRN
Qty: 20 TABLET | Refills: 0 | Status: SHIPPED | OUTPATIENT
Start: 2022-09-22

## 2022-09-22 RX ORDER — LABETALOL HYDROCHLORIDE 5 MG/ML
INJECTION, SOLUTION INTRAVENOUS AS NEEDED
Status: DISCONTINUED | OUTPATIENT
Start: 2022-09-22 | End: 2022-09-22

## 2022-09-22 RX ORDER — GLYCOPYRROLATE 0.2 MG/ML
INJECTION, SOLUTION INTRAMUSCULAR; INTRAVENOUS AS NEEDED
Status: DISCONTINUED | OUTPATIENT
Start: 2022-09-22 | End: 2022-09-22 | Stop reason: SURG

## 2022-09-22 RX ORDER — HYDROMORPHONE HYDROCHLORIDE 1 MG/ML
0.6 INJECTION, SOLUTION INTRAMUSCULAR; INTRAVENOUS; SUBCUTANEOUS EVERY 5 MIN PRN
Status: DISCONTINUED | OUTPATIENT
Start: 2022-09-22 | End: 2022-09-22

## 2022-09-22 RX ORDER — ACETAMINOPHEN 500 MG
1000 TABLET ORAL ONCE
Status: COMPLETED | OUTPATIENT
Start: 2022-09-22 | End: 2022-09-22

## 2022-09-22 RX ORDER — MORPHINE SULFATE 4 MG/ML
4 INJECTION, SOLUTION INTRAMUSCULAR; INTRAVENOUS EVERY 10 MIN PRN
Status: DISCONTINUED | OUTPATIENT
Start: 2022-09-22 | End: 2022-09-22

## 2022-09-22 RX ORDER — LABETALOL HYDROCHLORIDE 5 MG/ML
INJECTION, SOLUTION INTRAVENOUS AS NEEDED
Status: DISCONTINUED | OUTPATIENT
Start: 2022-09-22 | End: 2022-09-22 | Stop reason: SURG

## 2022-09-22 RX ORDER — HYDROMORPHONE HYDROCHLORIDE 1 MG/ML
INJECTION, SOLUTION INTRAMUSCULAR; INTRAVENOUS; SUBCUTANEOUS AS NEEDED
Status: DISCONTINUED | OUTPATIENT
Start: 2022-09-22 | End: 2022-09-22 | Stop reason: SURG

## 2022-09-22 RX ORDER — CLINDAMYCIN PHOSPHATE 900 MG/50ML
900 INJECTION INTRAVENOUS ONCE
Status: DISCONTINUED | OUTPATIENT
Start: 2022-09-22 | End: 2022-09-22 | Stop reason: HOSPADM

## 2022-09-22 RX ORDER — NALOXONE HYDROCHLORIDE 0.4 MG/ML
80 INJECTION, SOLUTION INTRAMUSCULAR; INTRAVENOUS; SUBCUTANEOUS AS NEEDED
Status: DISCONTINUED | OUTPATIENT
Start: 2022-09-22 | End: 2022-09-22

## 2022-09-22 RX ADMIN — HYDROMORPHONE HYDROCHLORIDE 0.2 MG: 1 INJECTION, SOLUTION INTRAMUSCULAR; INTRAVENOUS; SUBCUTANEOUS at 08:18:00

## 2022-09-22 RX ADMIN — SODIUM CHLORIDE, SODIUM LACTATE, POTASSIUM CHLORIDE, CALCIUM CHLORIDE: 600; 310; 30; 20 INJECTION, SOLUTION INTRAVENOUS at 09:13:00

## 2022-09-22 RX ADMIN — LABETALOL HYDROCHLORIDE 5 MG: 5 INJECTION, SOLUTION INTRAVENOUS at 08:55:00

## 2022-09-22 RX ADMIN — ROCURONIUM BROMIDE 10 MG: 10 INJECTION, SOLUTION INTRAVENOUS at 08:34:00

## 2022-09-22 RX ADMIN — ONDANSETRON 4 MG: 2 INJECTION INTRAMUSCULAR; INTRAVENOUS at 07:49:00

## 2022-09-22 RX ADMIN — SODIUM CHLORIDE, SODIUM LACTATE, POTASSIUM CHLORIDE, CALCIUM CHLORIDE: 600; 310; 30; 20 INJECTION, SOLUTION INTRAVENOUS at 07:27:00

## 2022-09-22 RX ADMIN — LABETALOL HYDROCHLORIDE 5 MG: 5 INJECTION, SOLUTION INTRAVENOUS at 08:21:00

## 2022-09-22 RX ADMIN — ROCURONIUM BROMIDE 5 MG: 10 INJECTION, SOLUTION INTRAVENOUS at 07:28:00

## 2022-09-22 RX ADMIN — CLINDAMYCIN PHOSPHATE 900 MG: 150 INJECTION, SOLUTION INTRAVENOUS at 07:34:00

## 2022-09-22 RX ADMIN — GLYCOPYRROLATE 0.6 MG: 0.2 INJECTION, SOLUTION INTRAMUSCULAR; INTRAVENOUS at 09:09:00

## 2022-09-22 RX ADMIN — LABETALOL HYDROCHLORIDE 5 MG: 5 INJECTION, SOLUTION INTRAVENOUS at 09:11:00

## 2022-09-22 RX ADMIN — NEOSTIGMINE METHYLSULFATE 5 MG: 1 INJECTION, SOLUTION INTRAVENOUS at 09:09:00

## 2022-09-22 RX ADMIN — DIPHENHYDRAMINE HYDROCHLORIDE 12.5 MG: 50 INJECTION INTRAMUSCULAR; INTRAVENOUS at 08:20:00

## 2022-09-22 RX ADMIN — HYDROMORPHONE HYDROCHLORIDE 0.3 MG: 1 INJECTION, SOLUTION INTRAMUSCULAR; INTRAVENOUS; SUBCUTANEOUS at 08:41:00

## 2022-09-22 RX ADMIN — ROCURONIUM BROMIDE 10 MG: 10 INJECTION, SOLUTION INTRAVENOUS at 08:06:00

## 2022-09-22 RX ADMIN — LIDOCAINE HYDROCHLORIDE 70 MG: 10 INJECTION, SOLUTION EPIDURAL; INFILTRATION; INTRACAUDAL; PERINEURAL at 07:28:00

## 2022-09-22 RX ADMIN — DEXAMETHASONE SODIUM PHOSPHATE 4 MG: 4 MG/ML VIAL (ML) INJECTION at 07:34:00

## 2022-09-22 RX ADMIN — ROCURONIUM BROMIDE 15 MG: 10 INJECTION, SOLUTION INTRAVENOUS at 07:35:00

## 2022-09-22 NOTE — ANESTHESIA PROCEDURE NOTES
Airway  Date/Time: 9/22/2022 7:31 AM  Urgency: elective    Airway not difficult    General Information and Staff    Patient location during procedure: OR  Anesthesiologist: Aliya Klein MD  Performed: anesthesiologist     Indications and Patient Condition  Indications for airway management: anesthesia  Spontaneous ventilation: present  Sedation level: deep  Preoxygenated: yes  Patient position: sniffing  Mask difficulty assessment: 0 - not attempted    Final Airway Details  Final airway type: endotracheal airway      Successful airway: ETT  Cuffed: yes   Successful intubation technique: direct laryngoscopy  Endotracheal tube insertion site: oral  Blade: Purvi  Blade size: #3  ETT size (mm): 7.5    Cormack-Lehane Classification: grade I - full view of glottis  Placement verified by: chest auscultation   Measured from: teeth  ETT to teeth (cm): 20  Number of attempts at approach: 1    Additional Comments  Atraumatic

## 2022-09-22 NOTE — INTERVAL H&P NOTE
Pre-op Diagnosis: Incisional hernia, without obstruction or gangrene [K43.2]    The above referenced H&P was reviewed by Warren Marr MD on 9/22/2022, the patient was examined and no significant changes have occurred in the patient's condition since the H&P was performed. I discussed with the patient and/or legal representative the potential benefits, risks and side effects of this procedure; the likelihood of the patient achieving goals; and potential problems that might occur during recuperation. I discussed reasonable alternatives to the procedure, including risks, benefits and side effects related to the alternatives and risks related to not receiving this procedure. We will proceed with procedure as planned.

## 2022-09-22 NOTE — BRIEF OP NOTE
Pre-Operative Diagnosis: Incisional hernia, without obstruction or gangrene [K43.2], incarcerated small bowel     Post-Operative Diagnosis: Multiple incarcerated incisional hernias, matted dense adhesions of omentum and small bowel     Procedure Performed:   Laparoscopic hybrid repair of multiple incarcerated incisional hernias with ventral light echo mesh, extensive lysis of matted dense adhesions modifier 22    Surgeon(s) and Role:     Leora Arriaga MD - Primary    Assistant(s):  Surgical Assistant.: Alina Matthews; Adarsh Escobedo CSA     Surgical Findings: See postop diagnosis     Specimen: Hernia sac     Estimated Blood Loss: 5 cc    Dictation Number:      Luis Child MD  9/22/2022  9:01 AM

## 2022-09-23 NOTE — OPERATIVE REPORT
Ronen Barreto    PATIENT'S NAME: Thomas Ordonez   ATTENDING PHYSICIAN: Bryn Figueroa MD   OPERATING PHYSICIAN: Bryn Figueroa MD   PATIENT ACCOUNT#:   [de-identified]    LOCATION:  [address]  MEDICAL RECORD #:   [id number]       YOB: 1970  ADMISSION DATE:       09/22/2022      OPERATION DATE:  09/22/2022    OPERATIVE REPORT    PREOPERATIVE DIAGNOSIS:  Large incarcerated incisional hernia. POSTOPERATIVE DIAGNOSIS:  Multiple incarcerated incisional hernias. PROCEDURE:  Laparoscopic hybrid repair of multiple incarcerated incisional hernias with large Ventralight Echo mesh, extensive lysis of dense small bowel and omental adhesions, modifier 22. ASSISTANT:  LINDA Rushing. ESTIMATED BLOOD LOSS:  5 mL. COMPLICATION:  None. ANESTHESIA:  General.    DISPOSITION:  To Recovery, tolerated well. INDICATIONS:  The patient is a pleasant 53-year-old female who had a hysterectomy for fibroids and developed a large incisional hernia. Detailed informed consent obtained. OPERATIVE TECHNIQUE:  She is taken to surgery. She is prepped and draped in usual sterile fashion. Veress needle placed at Arnold point. Abdomen insufflated. Then, 5 mm trocar placed using Optiview. Then, 3 additional trocars were placed under laparoscopic guidance. Exploration reveals large incarcerated small bowel and omentum within the lower abdominal wall incisional hernia. Extensive lysis of adhesions is performed requiring over an hour of operative time. There are multiple Swiss cheese-like defects. In total, the defect measures approximately 15 cm. A 5 cm incision is made utilizing her old lower midline scar. The hernia sac is removed. The fascia is approximated using running 0 looped PDS, and the large Ventralight is placed into the abdominal cavity. Once this is completed, the balloon is inflated and the mesh is secured using double crown technique of Securestrap.   Balloon retrieved intact. All hemostasis maintained. Wound irrigated. Marcaine infiltrated for local block. Closed in layers with 2-0 chromic, 3-0 Monocryl and Dermabond.      Dictated By Anne Mandel MD  d: 09/22/2022 09:09:10  t: 09/22/2022 17:09:42  [facility] [id number]  UL/    cc: Lupis Sims MD

## 2022-09-28 ENCOUNTER — TELEPHONE (OUTPATIENT)
Dept: SURGERY | Facility: CLINIC | Age: 52
End: 2022-09-28

## 2022-09-28 ENCOUNTER — PATIENT MESSAGE (OUTPATIENT)
Dept: SURGERY | Facility: CLINIC | Age: 52
End: 2022-09-28

## 2022-09-28 NOTE — TELEPHONE ENCOUNTER
Per pt had laparoscopic surgery 9/22 and 2 of the incision sites are red, warm, itchy.  Please advise

## 2022-10-03 ENCOUNTER — OFFICE VISIT (OUTPATIENT)
Dept: SURGERY | Facility: CLINIC | Age: 52
End: 2022-10-03
Payer: COMMERCIAL

## 2022-10-03 DIAGNOSIS — Z98.890 POST-OPERATIVE STATE: Primary | ICD-10-CM

## 2022-10-03 PROCEDURE — 99024 POSTOP FOLLOW-UP VISIT: CPT | Performed by: SURGERY

## 2022-10-03 NOTE — PROGRESS NOTES
Postoperative Patient Follow-up      10/3/2022    HPI      Airam Gaona is a 54year old adult postop after laparoscopic hybrid repair of large ventral hernia with mesh. Pain is improving. Exam  Abdomen is soft, incisions are clean dry intact. All Band-Aids and Steri-Strips removed. Assessment/Plan  Assessment   Post-operative state  (primary encounter diagnosis)    Continue lifting restrictions 2 weeks.   Call for problems         Vasu Nieto MD

## 2022-10-06 ENCOUNTER — TELEPHONE (OUTPATIENT)
Dept: SURGERY | Facility: CLINIC | Age: 52
End: 2022-10-06

## 2022-10-06 NOTE — TELEPHONE ENCOUNTER
10-6-22 @  2:45. Per Provider, MD BRUNILDA patient can return to work. Letter written to RTW @ 4 wks P/O. Light duty X 2 wks and full duty at 6 wks.       MARCELA

## 2022-10-06 NOTE — TELEPHONE ENCOUNTER
Per pt asking if it is ok to get her covid booster now or should she wait. Per pt her employer needs a work note and asking to upload into [employer].  Please advise

## 2022-10-12 ENCOUNTER — OFFICE VISIT (OUTPATIENT)
Dept: SURGERY | Facility: CLINIC | Age: 52
End: 2022-10-12
Payer: COMMERCIAL

## 2022-10-12 VITALS — SYSTOLIC BLOOD PRESSURE: 125 MMHG | TEMPERATURE: 99 F | HEART RATE: 102 BPM | DIASTOLIC BLOOD PRESSURE: 72 MMHG

## 2022-10-12 DIAGNOSIS — T14.8XXA WOUND INFECTION: Primary | ICD-10-CM

## 2022-10-12 DIAGNOSIS — L08.9 WOUND INFECTION: Primary | ICD-10-CM

## 2022-10-12 PROCEDURE — 3074F SYST BP LT 130 MM HG: CPT | Performed by: SURGERY

## 2022-10-12 PROCEDURE — 3078F DIAST BP <80 MM HG: CPT | Performed by: SURGERY

## 2022-10-12 PROCEDURE — 99213 OFFICE O/P EST LOW 20 MIN: CPT | Performed by: SURGERY

## 2022-10-12 RX ORDER — LEVOFLOXACIN 500 MG/1
500 TABLET, FILM COATED ORAL DAILY
Qty: 7 TABLET | Refills: 0 | Status: SHIPPED | OUTPATIENT
Start: 2022-10-12

## 2022-10-12 RX ORDER — CLINDAMYCIN HYDROCHLORIDE 300 MG/1
300 CAPSULE ORAL 3 TIMES DAILY
Qty: 21 CAPSULE | Refills: 0 | Status: SHIPPED | OUTPATIENT
Start: 2022-10-12

## 2022-10-12 NOTE — PATIENT INSTRUCTIONS
Start Levaquin and Flagyl this afternoon. Follow the instructions. You may take Tylenol or ibuprofen for fever or pain. Follow-up with me on Monday afternoon at the [facility] for reevaluation    If things do not improve in the next 2 days call my office.

## 2022-10-12 NOTE — PROGRESS NOTES
Surgery progress note    Patient presents with erythema near her lower midline incision. She denies any fevers. This is new since her last visit. 10 cm area of erythema, firm induration suspect infected seroma    Procedure  Abdomen prepped with Betadine in a sterile fashion 1% lidocaine is infiltrated 16-gauge needle is used and aspiration performed of purulent material.  This is cultured. Sterile dressing applied. She will be started on oral antibiotics.       Follow-up on Monday  Clindamycin and Levaquin

## 2022-10-17 ENCOUNTER — OFFICE VISIT (OUTPATIENT)
Dept: SURGERY | Facility: CLINIC | Age: 52
End: 2022-10-17
Payer: COMMERCIAL

## 2022-10-17 DIAGNOSIS — Z98.890 POST-OPERATIVE STATE: Primary | ICD-10-CM

## 2022-10-17 RX ORDER — ONDANSETRON 4 MG/1
4 TABLET, FILM COATED ORAL EVERY 8 HOURS PRN
Qty: 10 TABLET | Refills: 1 | Status: SHIPPED | OUTPATIENT
Start: 2022-10-17

## 2022-10-17 RX ORDER — SULFAMETHOXAZOLE AND TRIMETHOPRIM 800; 160 MG/1; MG/1
1 TABLET ORAL 2 TIMES DAILY
Qty: 14 TABLET | Refills: 0 | Status: SHIPPED | OUTPATIENT
Start: 2022-10-17

## 2022-10-17 NOTE — PROGRESS NOTES
Surgery progress note    Patient presents for her third postop visit. Last visit she had some cellulitis near her large hernia sac. This was aspirated and sent for culture. Gram-positive cocci without identifiable bacteria identified. She was placed on clindamycin and Levaquin. She presents today with increased drainage and a small opening at the superior aspect of her midline incision. This is probed and more pus is evacuated. All loculations are broken up and packed with iodoform. Continue clindamycin 3 times daily    Change packing daily.   Follow-up 1 week

## 2022-10-17 NOTE — PATIENT INSTRUCTIONS
Please change packing daily, pull iodoform and repack with a Q-tip.     Refill prescription for Bactrim DS twice a day    Follow-up in 1 week

## 2022-10-18 ENCOUNTER — TELEPHONE (OUTPATIENT)
Dept: SURGERY | Facility: CLINIC | Age: 52
End: 2022-10-18

## 2022-10-18 NOTE — TELEPHONE ENCOUNTER
RC at 3:15 on 10-18-22. All questions answered. (Wound care)  Patient denies fever and her partner has changed the packing.       MARCELA

## 2022-10-25 ENCOUNTER — OFFICE VISIT (OUTPATIENT)
Dept: SURGERY | Facility: CLINIC | Age: 52
End: 2022-10-25
Payer: COMMERCIAL

## 2022-10-25 DIAGNOSIS — T14.8XXA WOUND INFECTION: Primary | ICD-10-CM

## 2022-10-25 DIAGNOSIS — L08.9 WOUND INFECTION: Primary | ICD-10-CM

## 2022-10-25 PROCEDURE — 99024 POSTOP FOLLOW-UP VISIT: CPT | Performed by: SURGERY

## 2022-10-25 NOTE — PROGRESS NOTES
[facility], [address] [facility], [facility] GENERAL SURGERY    Progress Note    Sayra Boateng Patient Status:  Specimen    1970 MRN [id number]   Location [facility], [address] [facility], [facility] SURGERY Attending No att. providers found   Hosp Day # 0 PCP Lawrence Hancock MD     Assessment and Plan:     Patient is here for follow-up postop wound infection. She has been packing daily with iodoform. Packing. Skin breakout secondary to tape  -Hydrocortisone ointment to the rash daily  -Okay to return to work tomorrow  -Follow-up as needed    Subjective:   Wound much improved no further drainage    Objective:   No data found. Exam: Wound is clean dry intact probe to 1 mm covered with a Band-Aid. 8 blisters on each side. Results:     Lab Results   Component Value Date    WBC 7.6 09/15/2022    HGB 15.2 09/15/2022    HCT 44.8 09/15/2022    .0 09/15/2022    CREATSERUM 0.71 2022    BUN 12 2022     2022    K 4.1 2022     2022    CO2 21.0 2022     (H) 2022    CA 9.9 2022    ALB 4.0 2022    ALKPHO 88 2022    BILT 0.5 2022    TP 7.7 2022    AST 18 2022    ALT 38 2022    TSH 1.660 2021       No results found.             Jeison Tucker MD  10/25/2022

## 2022-11-04 ENCOUNTER — TELEPHONE (OUTPATIENT)
Dept: FAMILY MEDICINE CLINIC | Facility: CLINIC | Age: 52
End: 2022-11-04

## 2022-11-04 NOTE — TELEPHONE ENCOUNTER
Brock message sent.      ----- Message from [facility], RN sent at 11/4/2022 10:32 AM CDT -----  Regarding: FW: Swollen Feet      ----- Message -----  From: Freda Norris  Sent: 11/3/2022  11:10 PM CDT  To: Em Rn Triage  Subject: Swollen Feet                                     Good Evening,    My feet are swollen. The swelling began with the sulfamethoxazole-trimethoprim antibiotic Dr. Daja Rubio put me on for the infection I got after surgery. The swelling went down a bit after I finished the pills, but it never went away entirely. It has since gotten worse in the last two days. Please advise what I should do.   Thank you,  Sari Gusman

## 2022-11-07 ENCOUNTER — TELEPHONE (OUTPATIENT)
Facility: CLINIC | Age: 52
End: 2022-11-07

## 2022-11-07 DIAGNOSIS — Z12.11 SCREEN FOR COLON CANCER: Primary | ICD-10-CM

## 2022-11-07 NOTE — TELEPHONE ENCOUNTER
Cancelled in Epic and endo.     CANCELLED for:  Colonoscopy-screen 40687    Provider Name:  Dr. Gust Galeazzi  Date:  12/5/2022  Location:  [facility]  Sedation:  MAC  Time:  10:45 am

## 2022-11-07 NOTE — TELEPHONE ENCOUNTER
Called patient, confirmed name and . She states that after applying compression stockings the swelling went away and she has concerns at this time.

## 2022-11-10 ENCOUNTER — OFFICE VISIT (OUTPATIENT)
Dept: NEUROLOGY | Facility: CLINIC | Age: 52
End: 2022-11-10
Payer: COMMERCIAL

## 2022-11-10 VITALS
SYSTOLIC BLOOD PRESSURE: 126 MMHG | BODY MASS INDEX: 46.07 KG/M2 | HEIGHT: 63 IN | WEIGHT: 260 LBS | HEART RATE: 80 BPM | DIASTOLIC BLOOD PRESSURE: 80 MMHG

## 2022-11-10 DIAGNOSIS — G43.101 MIGRAINE WITH AURA AND WITH STATUS MIGRAINOSUS, NOT INTRACTABLE: Primary | ICD-10-CM

## 2022-11-10 DIAGNOSIS — G47.8 NON-RESTORATIVE SLEEP: ICD-10-CM

## 2022-11-10 RX ORDER — RIMEGEPANT SULFATE 75 MG/75MG
75 TABLET, ORALLY DISINTEGRATING ORAL AS NEEDED
Qty: 8 TABLET | Refills: 0 | Status: SHIPPED | OUTPATIENT
Start: 2022-11-10 | End: 2022-12-10

## 2022-11-10 RX ORDER — VENLAFAXINE HYDROCHLORIDE 37.5 MG/1
CAPSULE, EXTENDED RELEASE ORAL
Qty: 325 CAPSULE | Refills: 0 | Status: SHIPPED | OUTPATIENT
Start: 2022-11-10 | End: 2023-02-08

## 2022-11-11 RX ORDER — ATORVASTATIN CALCIUM 20 MG/1
20 TABLET, FILM COATED ORAL NIGHTLY
Qty: 90 TABLET | Refills: 0 | Status: SHIPPED | OUTPATIENT
Start: 2022-11-11

## 2022-11-16 ENCOUNTER — TELEPHONE (OUTPATIENT)
Dept: NEUROLOGY | Facility: CLINIC | Age: 52
End: 2022-11-16

## 2022-11-16 DIAGNOSIS — G43.101 MIGRAINE WITH AURA AND WITH STATUS MIGRAINOSUS, NOT INTRACTABLE: Primary | ICD-10-CM

## 2022-11-16 NOTE — TELEPHONE ENCOUNTER
PA for venlafaxine requested through [facility]. Will await form to be sent through [facility].

## 2022-11-18 ENCOUNTER — TELEPHONE (OUTPATIENT)
Dept: NEUROLOGY | Facility: CLINIC | Age: 52
End: 2022-11-18

## 2022-11-18 ENCOUNTER — IMMUNIZATION (OUTPATIENT)
Dept: LAB | Age: 52
End: 2022-11-18
Attending: EMERGENCY MEDICINE
Payer: COMMERCIAL

## 2022-11-18 DIAGNOSIS — Z23 NEED FOR VACCINATION: Primary | ICD-10-CM

## 2022-11-18 PROCEDURE — 0134A SARSCOV2 VAC BVL 50MCG/0.5ML: CPT

## 2022-11-18 RX ORDER — VENLAFAXINE HYDROCHLORIDE 150 MG/1
150 CAPSULE, EXTENDED RELEASE ORAL DAILY
Qty: 90 CAPSULE | Refills: 0 | OUTPATIENT
Start: 2022-11-18

## 2022-11-18 RX ORDER — VENLAFAXINE HYDROCHLORIDE 37.5 MG/1
CAPSULE, EXTENDED RELEASE ORAL
Qty: 21 CAPSULE | Refills: 0 | OUTPATIENT
Start: 2022-11-18 | End: 2022-12-02

## 2022-11-18 NOTE — TELEPHONE ENCOUNTER
PA for venlafaxine DENIED. Per [facility], The dose prescribed requires multiple same strength capsules be used and would otherwise require two or more strengths to be used. Prescription order  into 2 strengths.  Orders PEND & routed to provider for review & signature if ok

## 2022-11-21 RX ORDER — MELOXICAM 15 MG/1
TABLET ORAL
Qty: 30 TABLET | Refills: 0 | Status: SHIPPED | OUTPATIENT
Start: 2022-11-21

## 2022-11-21 NOTE — TELEPHONE ENCOUNTER
Rx request for Meloxicam 15 mg, please review and sign off if appropriate. Thank you. Last seen: 8/24/22  Last refill: 8/24/22 #30 with 0 refill.  Noted not on patients current med list.

## 2022-11-29 ENCOUNTER — TELEPHONE (OUTPATIENT)
Dept: NEUROLOGY | Facility: CLINIC | Age: 52
End: 2022-11-29

## 2022-11-29 NOTE — TELEPHONE ENCOUNTER
Called & notified pt of message from Dr Gail Alvarenga. Pt has f/u appointment scheduled for 1/17/2023 & will discuss results in more details at that time.  Declined earlier appointment at this time

## 2022-12-03 DIAGNOSIS — G43.101 MIGRAINE WITH AURA AND WITH STATUS MIGRAINOSUS, NOT INTRACTABLE: ICD-10-CM

## 2022-12-05 RX ORDER — VENLAFAXINE HYDROCHLORIDE 150 MG/1
CAPSULE, EXTENDED RELEASE ORAL
Qty: 90 CAPSULE | Refills: 0 | OUTPATIENT
Start: 2022-12-05

## 2022-12-05 RX ORDER — VENLAFAXINE HYDROCHLORIDE 37.5 MG/1
CAPSULE, EXTENDED RELEASE ORAL
Qty: 21 CAPSULE | Refills: 0 | OUTPATIENT
Start: 2022-12-05

## 2022-12-07 RX ORDER — LISINOPRIL 10 MG/1
10 TABLET ORAL NIGHTLY
Qty: 90 TABLET | Refills: 1 | Status: SHIPPED | OUTPATIENT
Start: 2022-12-07

## 2022-12-07 NOTE — TELEPHONE ENCOUNTER
Refill passed per [facility] [facility] protocol. Requested Prescriptions   Pending Prescriptions Disp Refills    LISINOPRIL 10 MG Oral Tab [Pharmacy Med Name: LISINOPRIL 10MG TABLETS] 90 tablet 0     Sig: TAKE 1 TABLET(10 MG) BY MOUTH DAILY       Hypertensive Medications Protocol Passed - 12/6/2022  8:19 PM        Passed - In person appointment in the past 12 or next 3 months     Recent Outpatient Visits              3 weeks ago Migraine with aura and with status migrainosus, not intractable    [address], DO [place]    Office Visit    1 month ago Wound infection    [facility], [address], [facility] Steve Mandel MD    Office Visit    1 month ago [facility] Steve Mandel MD    Office Visit    1 month ago Wound infection    [facility], [address], [address] [facility] Steve Mandel MD    Office Visit    2 months ago Post-operative [facility] for Svetlana Ortiz MD    Office Visit          Future Appointments         Provider Department Appt Notes    In 1 [facility] [address] [facility] [address]               Passed - Last BP reading less than 140/90     BP Readings from Last 1 Encounters:  11/10/22 : 126/80              Passed - CMP or BMP in past 6 months     Recent Results (from the past 4392 hour(s))   COMP METABOLIC PANEL (14)    Collection Time: 07/27/22 11:00 AM   Result Value Ref Range    Glucose 118 (H) 70 - 99 mg/dL    Sodium 141 136 - 145 mmol/L    Potassium 4.1 3.5 - 5.1 mmol/L    Chloride 108 98 - 112 mmol/L    CO2 21.0 21.0 - 32.0 mmol/L    Anion Gap 12 0 - 18 mmol/L    BUN 12 7 - 18 mg/dL    Creatinine 0.71 Male 0.70-1.30 :  Female 0.55-1.02 mg/dL    BUN/CREA Ratio 16.9 10.0 - 20.0    Calcium, Total 9.9 8.5 - 10.1 mg/dL    Calculated Osmolality 293 275 - 295 mOsm/kg    GFR, Non- 100 >=60    GFR, -American 116 >=60 ALT 38 Male 16-61 : Female 13-56 U/L    AST 18 15 - 37 U/L    Alkaline Phosphatase 88 Male  : Female  U/L    Bilirubin, Total 0.5 0.1 - 2.0 mg/dL    Total Protein 7.7 6.4 - 8.2 g/dL    Albumin 4.0 3.4 - 5.0 g/dL    Globulin  3.7 2.8 - 4.4 g/dL    A/G Ratio 1.1 1.0 - 2.0    Patient Fasting for CMP? Yes      *Note: Due to a large number of results and/or encounters for the requested time period, some results have not been displayed. A complete set of results can be found in Results Review.                Passed - In person appointment or virtual visit in the past 6 months     Recent Outpatient Visits              3 weeks ago Migraine with aura and with status migrainosus, not intractable    RADHA Dougherty DO    Office Visit    1 month ago Wound infection    [address], [facility] Li Chand MD    Office Visit    1 month ago Post-operative [facility] Li Chand MD    Office Visit    1 month ago Wound infection    [address], [facility] Li Chand MD    Office Visit    2 months ago Post-operative state [facility] Li Chand MD    Office Visit          Future Appointments         Provider Department Appt Notes    In 1 month [address] or GFRNAA > 50     GFR Evaluation  GFRNAA: 100 , resulted on 7/27/2022                Future Appointments         Provider Department Appt Notes    In 1 month [address] [facility] [address]            Recent Outpatient Visits              3 weeks ago Migraine with aura and with status migrainosus, not intractable    RADHA Dougherty DO    Office Visit    1 month ago Wound infection    [address], [facility] Li Chand MD    Office Visit    1 month ago Post-operative [facility] [facility] Surgery Pushpa Ledesma MD    Office Visit    1 month ago Wound infection    [facility], [address], [facility] Pushpa Ledesma MD    Office Visit    2 months ago Post-operative [facility] Surgery Pushpa Ledesma MD    Office Visit

## 2022-12-09 ENCOUNTER — PATIENT MESSAGE (OUTPATIENT)
Dept: NEUROLOGY | Facility: CLINIC | Age: 52
End: 2022-12-09

## 2022-12-12 NOTE — TELEPHONE ENCOUNTER
Called patient. Left message with explanation appt was canceled due to a change in provider schedule. Dr Gerry Walker is now on call the week of Jan 17th. Left message to call office and re-schedule.

## 2022-12-12 NOTE — TELEPHONE ENCOUNTER
From: Jonathan Bojorquez  To: Zeynep Plasencia,   Sent: 12/9/2022 2:41 PM CST  Subject: Follow-up Appointment Canceled? Hello,    On Dec. 5, I received a message that my Jan. 17 1pm appointment with Dr. Daniel Williamson was canceled with no explanation. (This was after I received a confusing call saying that I might want an earlier appointment to go over the results from my MRI, but that he had no earlier appointments available.) I have tried calling the office several times, but was unable to reach anyone. I am very concerned about the results from my MRI so I don't understand why my appointment was canceled. I will try calling again on Monday, but would appreciate any clarification on this matter.     Thank you,  Didier Gomez

## 2023-01-10 ENCOUNTER — OFFICE VISIT (OUTPATIENT)
Dept: NEUROLOGY | Facility: CLINIC | Age: 53
End: 2023-01-10
Payer: COMMERCIAL

## 2023-01-10 VITALS
DIASTOLIC BLOOD PRESSURE: 80 MMHG | SYSTOLIC BLOOD PRESSURE: 126 MMHG | WEIGHT: 260 LBS | BODY MASS INDEX: 46.07 KG/M2 | HEIGHT: 63 IN | HEART RATE: 106 BPM

## 2023-01-10 DIAGNOSIS — G43.101 MIGRAINE WITH AURA AND WITH STATUS MIGRAINOSUS, NOT INTRACTABLE: ICD-10-CM

## 2023-01-10 DIAGNOSIS — R90.82 WHITE MATTER ABNORMALITY ON MRI OF BRAIN: ICD-10-CM

## 2023-01-10 DIAGNOSIS — R90.89 ABNORMAL FINDING ON MRI OF BRAIN: Primary | ICD-10-CM

## 2023-01-10 PROCEDURE — 3074F SYST BP LT 130 MM HG: CPT | Performed by: OTHER

## 2023-01-10 PROCEDURE — 3008F BODY MASS INDEX DOCD: CPT | Performed by: OTHER

## 2023-01-10 PROCEDURE — 99214 OFFICE O/P EST MOD 30 MIN: CPT | Performed by: OTHER

## 2023-01-10 PROCEDURE — 3079F DIAST BP 80-89 MM HG: CPT | Performed by: OTHER

## 2023-01-10 RX ORDER — RIMEGEPANT SULFATE 75 MG/75MG
75 TABLET, ORALLY DISINTEGRATING ORAL AS NEEDED
Qty: 8 TABLET | Refills: 11 | Status: SHIPPED | OUTPATIENT
Start: 2023-01-10 | End: 2023-02-09

## 2023-01-11 ENCOUNTER — TELEPHONE (OUTPATIENT)
Dept: NEUROLOGY | Facility: CLINIC | Age: 53
End: 2023-01-11

## 2023-01-11 NOTE — TELEPHONE ENCOUNTER
Received approval for the home sleep study.   Approval dates: 1/10/23 - 3/10/23  Form has been sent for scanning

## 2023-01-17 RX ORDER — MELOXICAM 15 MG/1
15 TABLET ORAL DAILY
Qty: 30 TABLET | Refills: 0 | Status: SHIPPED | OUTPATIENT
Start: 2023-01-17

## 2023-01-18 ENCOUNTER — OFFICE VISIT (OUTPATIENT)
Dept: SLEEP CENTER | Age: 53
End: 2023-01-18
Attending: Other
Payer: COMMERCIAL

## 2023-01-18 DIAGNOSIS — G43.101 MIGRAINE WITH AURA AND WITH STATUS MIGRAINOSUS, NOT INTRACTABLE: ICD-10-CM

## 2023-01-18 DIAGNOSIS — G47.8 NON-RESTORATIVE SLEEP: ICD-10-CM

## 2023-01-18 PROCEDURE — 95806 SLEEP STUDY UNATT&RESP EFFT: CPT

## 2023-01-23 ENCOUNTER — TELEPHONE (OUTPATIENT)
Dept: PHYSICAL MEDICINE AND REHAB | Facility: CLINIC | Age: 53
End: 2023-01-23

## 2023-01-23 NOTE — TELEPHONE ENCOUNTER
Received the reports for the patients MRI of the brain and cervical spine via fax. Reports have been placed on Dr. Vianey Coleman desk to review and advise. Reports have also been sent for scanning. Reviewed and electronically signed by:  [address], [facility]

## 2023-01-26 ENCOUNTER — TELEPHONE (OUTPATIENT)
Dept: PULMONOLOGY | Facility: CLINIC | Age: 53
End: 2023-01-26

## 2023-01-26 DIAGNOSIS — G47.33 OSA (OBSTRUCTIVE SLEEP APNEA): Primary | ICD-10-CM

## 2023-01-30 ENCOUNTER — OFFICE VISIT (OUTPATIENT)
Dept: SLEEP CENTER | Age: 53
End: 2023-01-30
Attending: Other
Payer: COMMERCIAL

## 2023-01-30 DIAGNOSIS — G47.33 OSA (OBSTRUCTIVE SLEEP APNEA): ICD-10-CM

## 2023-01-30 PROCEDURE — 95806 SLEEP STUDY UNATT&RESP EFFT: CPT

## 2023-02-01 ENCOUNTER — TELEPHONE (OUTPATIENT)
Dept: PHYSICAL THERAPY | Facility: HOSPITAL | Age: 53
End: 2023-02-01

## 2023-02-17 RX ORDER — ATORVASTATIN CALCIUM 20 MG/1
20 TABLET, FILM COATED ORAL NIGHTLY
Qty: 90 TABLET | Refills: 0 | Status: SHIPPED | OUTPATIENT
Start: 2023-02-17

## 2023-02-23 ENCOUNTER — TELEPHONE (OUTPATIENT)
Dept: PHYSICAL THERAPY | Facility: HOSPITAL | Age: 53
End: 2023-02-23

## 2023-02-27 ENCOUNTER — OFFICE VISIT (OUTPATIENT)
Dept: PHYSICAL THERAPY | Facility: HOSPITAL | Age: 53
End: 2023-02-27
Attending: ORTHOPAEDIC SURGERY
Payer: COMMERCIAL

## 2023-02-27 DIAGNOSIS — M17.0 PRIMARY OSTEOARTHRITIS OF BOTH KNEES: ICD-10-CM

## 2023-02-27 PROCEDURE — 97110 THERAPEUTIC EXERCISES: CPT

## 2023-02-27 PROCEDURE — 97162 PT EVAL MOD COMPLEX 30 MIN: CPT

## 2023-03-01 ENCOUNTER — OFFICE VISIT (OUTPATIENT)
Dept: PHYSICAL THERAPY | Facility: HOSPITAL | Age: 53
End: 2023-03-01
Attending: ORTHOPAEDIC SURGERY
Payer: COMMERCIAL

## 2023-03-01 PROCEDURE — 97110 THERAPEUTIC EXERCISES: CPT

## 2023-03-01 PROCEDURE — 97140 MANUAL THERAPY 1/> REGIONS: CPT

## 2023-03-02 RX ORDER — MELOXICAM 15 MG/1
TABLET ORAL
Qty: 30 TABLET | Refills: 0 | Status: SHIPPED | OUTPATIENT
Start: 2023-03-02

## 2023-03-02 NOTE — TELEPHONE ENCOUNTER
Rx request for Meloxicam 15mg, please review and sign off if appropriate. Thank you. Last seen: 8/24/22  Last refill: 1/17/23 #30 with 0 refills.

## 2023-03-04 DIAGNOSIS — G43.101 MIGRAINE WITH AURA AND WITH STATUS MIGRAINOSUS, NOT INTRACTABLE: ICD-10-CM

## 2023-03-06 ENCOUNTER — OFFICE VISIT (OUTPATIENT)
Dept: PHYSICAL THERAPY | Facility: HOSPITAL | Age: 53
End: 2023-03-06
Attending: ORTHOPAEDIC SURGERY
Payer: COMMERCIAL

## 2023-03-06 PROCEDURE — 97110 THERAPEUTIC EXERCISES: CPT

## 2023-03-06 PROCEDURE — 97140 MANUAL THERAPY 1/> REGIONS: CPT

## 2023-03-06 NOTE — TELEPHONE ENCOUNTER
Refill Request    Medication:Venlafaxine HCl  MG Oral Capsule Extended Release 24 Sig - Route: Take 1 capsule (150 mg total) by mouth daily.  To be started after completion of Venlafaxine ER 37.5 Caps - Oral    LOV:01/23/2023  NOV:04/14/2023    Last refill/[place]:11/29/2022

## 2023-03-07 RX ORDER — VENLAFAXINE HYDROCHLORIDE 150 MG/1
150 CAPSULE, EXTENDED RELEASE ORAL DAILY
Qty: 90 CAPSULE | Refills: 0 | Status: SHIPPED | OUTPATIENT
Start: 2023-03-07

## 2023-03-08 ENCOUNTER — OFFICE VISIT (OUTPATIENT)
Dept: PHYSICAL THERAPY | Facility: HOSPITAL | Age: 53
End: 2023-03-08
Attending: ORTHOPAEDIC SURGERY
Payer: COMMERCIAL

## 2023-03-08 PROCEDURE — 97116 GAIT TRAINING THERAPY: CPT

## 2023-03-08 PROCEDURE — 97110 THERAPEUTIC EXERCISES: CPT

## 2023-03-08 PROCEDURE — 97140 MANUAL THERAPY 1/> REGIONS: CPT

## 2023-03-13 ENCOUNTER — APPOINTMENT (OUTPATIENT)
Dept: PHYSICAL THERAPY | Facility: HOSPITAL | Age: 53
End: 2023-03-13
Attending: ORTHOPAEDIC SURGERY
Payer: COMMERCIAL

## 2023-03-15 ENCOUNTER — APPOINTMENT (OUTPATIENT)
Dept: PHYSICAL THERAPY | Facility: HOSPITAL | Age: 53
End: 2023-03-15
Attending: ORTHOPAEDIC SURGERY
Payer: COMMERCIAL

## 2023-03-20 ENCOUNTER — OFFICE VISIT (OUTPATIENT)
Dept: PHYSICAL THERAPY | Facility: HOSPITAL | Age: 53
End: 2023-03-20
Attending: ORTHOPAEDIC SURGERY
Payer: COMMERCIAL

## 2023-03-20 PROCEDURE — 97140 MANUAL THERAPY 1/> REGIONS: CPT

## 2023-03-20 PROCEDURE — 97110 THERAPEUTIC EXERCISES: CPT

## 2023-03-28 ENCOUNTER — TELEPHONE (OUTPATIENT)
Dept: ORTHOPEDICS CLINIC | Facility: CLINIC | Age: 53
End: 2023-03-28

## 2023-03-29 ENCOUNTER — OFFICE VISIT (OUTPATIENT)
Dept: PHYSICAL THERAPY | Facility: HOSPITAL | Age: 53
End: 2023-03-29
Attending: FAMILY MEDICINE
Payer: COMMERCIAL

## 2023-03-29 PROCEDURE — 97110 THERAPEUTIC EXERCISES: CPT

## 2023-03-29 PROCEDURE — 97140 MANUAL THERAPY 1/> REGIONS: CPT

## 2023-04-02 ENCOUNTER — PATIENT MESSAGE (OUTPATIENT)
Dept: NEUROLOGY | Facility: CLINIC | Age: 53
End: 2023-04-02

## 2023-04-03 ENCOUNTER — OFFICE VISIT (OUTPATIENT)
Dept: PHYSICAL THERAPY | Facility: HOSPITAL | Age: 53
End: 2023-04-03
Attending: FAMILY MEDICINE
Payer: COMMERCIAL

## 2023-04-03 PROCEDURE — 97110 THERAPEUTIC EXERCISES: CPT

## 2023-04-03 PROCEDURE — 97140 MANUAL THERAPY 1/> REGIONS: CPT

## 2023-04-03 NOTE — TELEPHONE ENCOUNTER
Pt returned call & added onto schedule for 5/1 @ 1pm.     She would like Dr Kathleen Whitten to know the following:    -She has been reading up on MS, paying more attention to her body, & journaling about symptoms she has been experiencing. Numbness/Tingling to right hand has been more intense to middle finger & feels like it is being squeezed. She has noticed intermittent mild tremors to her right hand. She doesn't necessarily notice them, but a friend pointed them out. She has notices some intermittent numbness & tingling to her right foot.      -Pt always felt her urination urges were normal until she began to read & now feels it is not.  When she has to go, she has to go within a few minutes, so she always maps out bathrooms when she goes somewhere.     -Dizziness & blurred vision present with migraines in the past & are now present at random times for brief periods & resolve without intervention

## 2023-04-03 NOTE — TELEPHONE ENCOUNTER
From: Farhat Junior  To: Jorje Ruiz DO  Sent: 4/2/2023 10:22 PM CDT  Subject: Canceled Appointment    I received a message that my 4/14/2023 appointment with Dr. Cherelle Palmer was canceled. I was given no explanation. The last time this happened, I was at least told it was due to him taking over appointments from another doctor. This time, nothing. I have rescheduled my appointment, but it's not until July. Please just let me know why my appointment, that I had to schedule so far in advance already, was canceled. I am in a bit of a spiral as I have been having symptoms related to my MRI results and have discovered that something, a possible symptom, I thought was \"normal\" is not.

## 2023-04-03 NOTE — TELEPHONE ENCOUNTER
Lavell Thomasonr patient requesting refill for meloxicam prior to her appt 5/22/23 please advise if you would be comfortable refilling or if she will need to wait until follow up thanks.

## 2023-04-05 ENCOUNTER — TELEPHONE (OUTPATIENT)
Dept: PHYSICAL THERAPY | Facility: HOSPITAL | Age: 53
End: 2023-04-05

## 2023-04-07 ENCOUNTER — LAB ENCOUNTER (OUTPATIENT)
Dept: LAB | Age: 53
End: 2023-04-07
Attending: FAMILY MEDICINE
Payer: COMMERCIAL

## 2023-04-07 ENCOUNTER — OFFICE VISIT (OUTPATIENT)
Dept: FAMILY MEDICINE CLINIC | Facility: CLINIC | Age: 53
End: 2023-04-07

## 2023-04-07 VITALS
SYSTOLIC BLOOD PRESSURE: 125 MMHG | HEIGHT: 63 IN | DIASTOLIC BLOOD PRESSURE: 88 MMHG | RESPIRATION RATE: 17 BRPM | BODY MASS INDEX: 45.54 KG/M2 | OXYGEN SATURATION: 98 % | WEIGHT: 257 LBS | HEART RATE: 78 BPM

## 2023-04-07 DIAGNOSIS — G56.03 BILATERAL CARPAL TUNNEL SYNDROME: ICD-10-CM

## 2023-04-07 DIAGNOSIS — Z12.11 SCREEN FOR COLON CANCER: ICD-10-CM

## 2023-04-07 DIAGNOSIS — Z12.31 VISIT FOR SCREENING MAMMOGRAM: ICD-10-CM

## 2023-04-07 DIAGNOSIS — M17.0 PRIMARY OSTEOARTHRITIS OF BOTH KNEES: ICD-10-CM

## 2023-04-07 DIAGNOSIS — Z90.710 S/P HYSTERECTOMY: ICD-10-CM

## 2023-04-07 DIAGNOSIS — Z00.00 ANNUAL PHYSICAL EXAM: ICD-10-CM

## 2023-04-07 DIAGNOSIS — Z00.00 ANNUAL PHYSICAL EXAM: Primary | ICD-10-CM

## 2023-04-07 LAB
ALBUMIN SERPL-MCNC: 4 G/DL (ref 3.4–5)
ALBUMIN/GLOB SERPL: 1.1 {RATIO} (ref 1–2)
ALP LIVER SERPL-CCNC: 83 U/L
ALT SERPL-CCNC: 41 U/L
ANION GAP SERPL CALC-SCNC: 7 MMOL/L (ref 0–18)
AST SERPL-CCNC: 15 U/L (ref 15–37)
BASOPHILS # BLD AUTO: 0.1 X10(3) UL (ref 0–0.2)
BASOPHILS NFR BLD AUTO: 1.6 %
BILIRUB SERPL-MCNC: 0.6 MG/DL (ref 0.1–2)
BUN BLD-MCNC: 15 MG/DL (ref 7–18)
BUN/CREAT SERPL: 20 (ref 10–20)
CALCIUM BLD-MCNC: 9.7 MG/DL (ref 8.5–10.1)
CHLORIDE SERPL-SCNC: 107 MMOL/L (ref 98–112)
CHOLEST SERPL-MCNC: 200 MG/DL (ref ?–200)
CO2 SERPL-SCNC: 26 MMOL/L (ref 21–32)
CREAT BLD-MCNC: 0.75 MG/DL
DEPRECATED RDW RBC AUTO: 41.2 FL (ref 35.1–46.3)
EOSINOPHIL # BLD AUTO: 0.22 X10(3) UL (ref 0–0.7)
EOSINOPHIL NFR BLD AUTO: 3.5 %
ERYTHROCYTE [DISTWIDTH] IN BLOOD BY AUTOMATED COUNT: 12.3 % (ref 11–15)
FASTING PATIENT LIPID ANSWER: YES
FASTING STATUS PATIENT QL REPORTED: YES
GFR SERPLBLD BASED ON 1.73 SQ M-ARVRAT: 97 ML/MIN/1.73M2 (ref 60–?)
GLOBULIN PLAS-MCNC: 3.5 G/DL (ref 2.8–4.4)
GLUCOSE BLD-MCNC: 108 MG/DL (ref 70–99)
HCT VFR BLD AUTO: 44.9 %
HDLC SERPL-MCNC: 48 MG/DL (ref 40–59)
HGB BLD-MCNC: 14.9 G/DL
IMM GRANULOCYTES # BLD AUTO: 0.01 X10(3) UL (ref 0–1)
IMM GRANULOCYTES NFR BLD: 0.2 %
LDLC SERPL CALC-MCNC: 122 MG/DL (ref ?–100)
LYMPHOCYTES # BLD AUTO: 1.82 X10(3) UL (ref 1–4)
LYMPHOCYTES NFR BLD AUTO: 29 %
MCH RBC QN AUTO: 30.3 PG (ref 26–34)
MCHC RBC AUTO-ENTMCNC: 33.2 G/DL (ref 31–37)
MCV RBC AUTO: 91.4 FL
MONOCYTES # BLD AUTO: 0.41 X10(3) UL (ref 0.1–1)
MONOCYTES NFR BLD AUTO: 6.5 %
NEUTROPHILS # BLD AUTO: 3.71 X10 (3) UL (ref 1.5–7.7)
NEUTROPHILS # BLD AUTO: 3.71 X10(3) UL (ref 1.5–7.7)
NEUTROPHILS NFR BLD AUTO: 59.2 %
NONHDLC SERPL-MCNC: 152 MG/DL (ref ?–130)
OSMOLALITY SERPL CALC.SUM OF ELEC: 291 MOSM/KG (ref 275–295)
PLATELET # BLD AUTO: 249 10(3)UL (ref 150–450)
POTASSIUM SERPL-SCNC: 4.2 MMOL/L (ref 3.5–5.1)
PROT SERPL-MCNC: 7.5 G/DL (ref 6.4–8.2)
RBC # BLD AUTO: 4.91 X10(6)UL
SODIUM SERPL-SCNC: 140 MMOL/L (ref 136–145)
TRIGL SERPL-MCNC: 170 MG/DL (ref 30–149)
TSI SER-ACNC: 1.12 MIU/ML (ref 0.36–3.74)
VLDLC SERPL CALC-MCNC: 30 MG/DL (ref 0–30)
WBC # BLD AUTO: 6.3 X10(3) UL (ref 4–11)

## 2023-04-07 PROCEDURE — 90750 HZV VACC RECOMBINANT IM: CPT | Performed by: FAMILY MEDICINE

## 2023-04-07 PROCEDURE — 3079F DIAST BP 80-89 MM HG: CPT | Performed by: FAMILY MEDICINE

## 2023-04-07 PROCEDURE — 99213 OFFICE O/P EST LOW 20 MIN: CPT | Performed by: FAMILY MEDICINE

## 2023-04-07 PROCEDURE — 80061 LIPID PANEL: CPT

## 2023-04-07 PROCEDURE — 85025 COMPLETE CBC W/AUTO DIFF WBC: CPT

## 2023-04-07 PROCEDURE — 80053 COMPREHEN METABOLIC PANEL: CPT

## 2023-04-07 PROCEDURE — 3074F SYST BP LT 130 MM HG: CPT | Performed by: FAMILY MEDICINE

## 2023-04-07 PROCEDURE — 99396 PREV VISIT EST AGE 40-64: CPT | Performed by: FAMILY MEDICINE

## 2023-04-07 PROCEDURE — 90471 IMMUNIZATION ADMIN: CPT | Performed by: FAMILY MEDICINE

## 2023-04-07 PROCEDURE — 36415 COLL VENOUS BLD VENIPUNCTURE: CPT

## 2023-04-07 PROCEDURE — 3008F BODY MASS INDEX DOCD: CPT | Performed by: FAMILY MEDICINE

## 2023-04-07 PROCEDURE — 84443 ASSAY THYROID STIM HORMONE: CPT

## 2023-04-10 ENCOUNTER — OFFICE VISIT (OUTPATIENT)
Dept: PHYSICAL THERAPY | Facility: HOSPITAL | Age: 53
End: 2023-04-10
Attending: FAMILY MEDICINE
Payer: COMMERCIAL

## 2023-04-10 PROCEDURE — 97110 THERAPEUTIC EXERCISES: CPT

## 2023-04-10 PROCEDURE — 97035 APP MDLTY 1+ULTRASOUND EA 15: CPT

## 2023-04-10 PROCEDURE — 97140 MANUAL THERAPY 1/> REGIONS: CPT

## 2023-04-10 NOTE — TELEPHONE ENCOUNTER
Hi,  Was she able to give us a call? If her headaches are worse we could try and add her on for Tuesday or Wednesday. On Wednesday I have to give a talk from 11:30 to 12 PM but otherwise we could add her on.

## 2023-04-11 ENCOUNTER — OFFICE VISIT (OUTPATIENT)
Dept: NEUROLOGY | Facility: CLINIC | Age: 53
End: 2023-04-11
Payer: COMMERCIAL

## 2023-04-11 VITALS
DIASTOLIC BLOOD PRESSURE: 84 MMHG | BODY MASS INDEX: 45.54 KG/M2 | WEIGHT: 257 LBS | SYSTOLIC BLOOD PRESSURE: 135 MMHG | HEART RATE: 118 BPM | HEIGHT: 63 IN

## 2023-04-11 DIAGNOSIS — R25.2 CRAMPS, MUSCLE, GENERAL: ICD-10-CM

## 2023-04-11 DIAGNOSIS — F32.A ANXIETY AND DEPRESSION: ICD-10-CM

## 2023-04-11 DIAGNOSIS — R20.0 NUMBNESS AND TINGLING: ICD-10-CM

## 2023-04-11 DIAGNOSIS — G43.101 MIGRAINE WITH AURA AND WITH STATUS MIGRAINOSUS, NOT INTRACTABLE: ICD-10-CM

## 2023-04-11 DIAGNOSIS — R20.2 NUMBNESS AND TINGLING: ICD-10-CM

## 2023-04-11 DIAGNOSIS — F41.9 ANXIETY AND DEPRESSION: ICD-10-CM

## 2023-04-11 DIAGNOSIS — M50.30 DEGENERATION OF CERVICAL INTERVERTEBRAL DISC: ICD-10-CM

## 2023-04-11 DIAGNOSIS — R25.1 TREMORS OF NERVOUS SYSTEM: ICD-10-CM

## 2023-04-11 DIAGNOSIS — R20.0 BILATERAL HAND NUMBNESS: Primary | ICD-10-CM

## 2023-04-11 DIAGNOSIS — G56.03 BILATERAL CARPAL TUNNEL SYNDROME: ICD-10-CM

## 2023-04-11 PROCEDURE — 99215 OFFICE O/P EST HI 40 MIN: CPT | Performed by: OTHER

## 2023-04-11 PROCEDURE — 3079F DIAST BP 80-89 MM HG: CPT | Performed by: OTHER

## 2023-04-11 PROCEDURE — 3075F SYST BP GE 130 - 139MM HG: CPT | Performed by: OTHER

## 2023-04-11 PROCEDURE — 3008F BODY MASS INDEX DOCD: CPT | Performed by: OTHER

## 2023-04-11 PROCEDURE — 99417 PROLNG OP E/M EACH 15 MIN: CPT | Performed by: OTHER

## 2023-04-13 ENCOUNTER — LAB ENCOUNTER (OUTPATIENT)
Dept: LAB | Facility: HOSPITAL | Age: 53
End: 2023-04-13
Attending: Other
Payer: COMMERCIAL

## 2023-04-13 DIAGNOSIS — R20.0 BILATERAL HAND NUMBNESS: ICD-10-CM

## 2023-04-13 DIAGNOSIS — R20.2 PARESTHESIA: ICD-10-CM

## 2023-04-13 DIAGNOSIS — R20.2 NUMBNESS AND TINGLING: ICD-10-CM

## 2023-04-13 DIAGNOSIS — R20.0 TACTILE ANESTHESIA: Primary | ICD-10-CM

## 2023-04-13 DIAGNOSIS — R20.0 NUMBNESS AND TINGLING: ICD-10-CM

## 2023-04-13 LAB
EST. AVERAGE GLUCOSE BLD GHB EST-MCNC: 100 MG/DL (ref 68–126)
HAV AB SER QL IA: NONREACTIVE
HBA1C MFR BLD: 5.1 % (ref ?–5.7)
HBV CORE AB SERPL QL IA: NONREACTIVE
HBV SURFACE AB SER QL: NONREACTIVE
HBV SURFACE AB SERPL IA-ACNC: <3.1 MIU/ML
HBV SURFACE AG SERPL QL IA: NONREACTIVE
HCV AB SERPL QL IA: NONREACTIVE
IGA SERPL-MCNC: 195 MG/DL (ref 70–312)
IGM SERPL-MCNC: 56.8 MG/DL (ref 43–279)
IMMUNOGLOBULIN PNL SER-MCNC: 1160 MG/DL (ref 791–1643)
TSI SER-ACNC: 1.74 MIU/ML (ref 0.36–3.74)

## 2023-04-13 PROCEDURE — 86036 ANCA SCREEN EACH ANTIBODY: CPT

## 2023-04-13 PROCEDURE — 83521 IG LIGHT CHAINS FREE EACH: CPT

## 2023-04-13 PROCEDURE — 83036 HEMOGLOBIN GLYCOSYLATED A1C: CPT

## 2023-04-13 PROCEDURE — 36415 COLL VENOUS BLD VENIPUNCTURE: CPT

## 2023-04-13 PROCEDURE — 84207 ASSAY OF VITAMIN B-6: CPT

## 2023-04-13 PROCEDURE — 86704 HEP B CORE ANTIBODY TOTAL: CPT

## 2023-04-13 PROCEDURE — 82595 ASSAY OF CRYOGLOBULIN: CPT

## 2023-04-13 PROCEDURE — 86803 HEPATITIS C AB TEST: CPT

## 2023-04-13 PROCEDURE — 86706 HEP B SURFACE ANTIBODY: CPT

## 2023-04-13 PROCEDURE — 87340 HEPATITIS B SURFACE AG IA: CPT

## 2023-04-13 PROCEDURE — 84425 ASSAY OF VITAMIN B-1: CPT

## 2023-04-13 PROCEDURE — 84165 PROTEIN E-PHORESIS SERUM: CPT

## 2023-04-13 PROCEDURE — 83516 IMMUNOASSAY NONANTIBODY: CPT

## 2023-04-13 PROCEDURE — 86038 ANTINUCLEAR ANTIBODIES: CPT

## 2023-04-13 PROCEDURE — 80503 PATH CLIN CONSLTJ SF 5-20: CPT

## 2023-04-13 PROCEDURE — 86708 HEPATITIS A ANTIBODY: CPT

## 2023-04-13 PROCEDURE — 84443 ASSAY THYROID STIM HORMONE: CPT

## 2023-04-13 PROCEDURE — 86334 IMMUNOFIX E-PHORESIS SERUM: CPT

## 2023-04-13 PROCEDURE — 82784 ASSAY IGA/IGD/IGG/IGM EACH: CPT

## 2023-04-15 LAB
VITAMIN B1 WHOLE BLD: 138.1 NMOL/L
VITAMIN B6: 6.4 UG/L

## 2023-04-17 ENCOUNTER — APPOINTMENT (OUTPATIENT)
Dept: PHYSICAL THERAPY | Facility: HOSPITAL | Age: 53
End: 2023-04-17
Attending: FAMILY MEDICINE
Payer: COMMERCIAL

## 2023-04-17 LAB
ALBUMIN SERPL ELPH-MCNC: 4.35 G/DL (ref 3.75–5.21)
ALBUMIN/GLOB SERPL: 1.53 {RATIO} (ref 1–2)
ALPHA1 GLOB SERPL ELPH-MCNC: 0.3 G/DL (ref 0.19–0.46)
ALPHA2 GLOB SERPL ELPH-MCNC: 0.73 G/DL (ref 0.48–1.05)
B-GLOBULIN SERPL ELPH-MCNC: 0.73 G/DL (ref 0.68–1.23)
GAMMA GLOB SERPL ELPH-MCNC: 1.09 G/DL (ref 0.62–1.7)
KAPPA LC FREE SER-MCNC: 2.1 MG/DL (ref 0.33–1.94)
KAPPA LC FREE/LAMBDA FREE SER NEPH: 1.7 {RATIO} (ref 0.26–1.65)
LAMBDA LC FREE SERPL-MCNC: 1.24 MG/DL (ref 0.57–2.63)
NUCLEAR IGG TITR SER IF: NEGATIVE {TITER}
PROT SERPL-MCNC: 7.2 G/DL (ref 6.4–8.2)

## 2023-04-19 ENCOUNTER — APPOINTMENT (OUTPATIENT)
Dept: PHYSICAL THERAPY | Facility: HOSPITAL | Age: 53
End: 2023-04-19
Attending: FAMILY MEDICINE
Payer: COMMERCIAL

## 2023-04-19 PROCEDURE — 97110 THERAPEUTIC EXERCISES: CPT

## 2023-04-19 PROCEDURE — 97035 APP MDLTY 1+ULTRASOUND EA 15: CPT

## 2023-04-19 PROCEDURE — 97140 MANUAL THERAPY 1/> REGIONS: CPT

## 2023-04-20 RX ORDER — MELOXICAM 15 MG/1
15 TABLET ORAL DAILY
Qty: 30 TABLET | Refills: 0 | Status: SHIPPED | OUTPATIENT
Start: 2023-04-20

## 2023-04-24 ENCOUNTER — TELEPHONE (OUTPATIENT)
Dept: PHYSICAL THERAPY | Facility: HOSPITAL | Age: 53
End: 2023-04-24

## 2023-04-24 ENCOUNTER — OFFICE VISIT (OUTPATIENT)
Dept: PHYSICAL THERAPY | Facility: HOSPITAL | Age: 53
End: 2023-04-24
Attending: FAMILY MEDICINE
Payer: COMMERCIAL

## 2023-04-24 LAB
ANCA BY IFA (RDL): NEGATIVE
ANTI-MPO AB (RDL): <20 UNITS
ANTI-PR-3 AB (RDL): <20 UNITS

## 2023-04-24 PROCEDURE — 97110 THERAPEUTIC EXERCISES: CPT

## 2023-04-24 PROCEDURE — 97140 MANUAL THERAPY 1/> REGIONS: CPT

## 2023-04-24 PROCEDURE — 97035 APP MDLTY 1+ULTRASOUND EA 15: CPT

## 2023-04-28 ENCOUNTER — OFFICE VISIT (OUTPATIENT)
Dept: PULMONOLOGY | Facility: CLINIC | Age: 53
End: 2023-04-28

## 2023-04-28 VITALS
OXYGEN SATURATION: 96 % | RESPIRATION RATE: 16 BRPM | BODY MASS INDEX: 46 KG/M2 | DIASTOLIC BLOOD PRESSURE: 98 MMHG | WEIGHT: 258 LBS | HEART RATE: 104 BPM | SYSTOLIC BLOOD PRESSURE: 148 MMHG

## 2023-04-28 DIAGNOSIS — G47.10 HYPERSOMNIA: Primary | ICD-10-CM

## 2023-04-28 PROCEDURE — 3080F DIAST BP >= 90 MM HG: CPT | Performed by: INTERNAL MEDICINE

## 2023-04-28 PROCEDURE — 3077F SYST BP >= 140 MM HG: CPT | Performed by: INTERNAL MEDICINE

## 2023-04-28 PROCEDURE — 99204 OFFICE O/P NEW MOD 45 MIN: CPT | Performed by: INTERNAL MEDICINE

## 2023-04-28 RX ORDER — ASCORBIC ACID 500 MG
TABLET ORAL
COMMUNITY
Start: 2023-01-02

## 2023-05-01 ENCOUNTER — TELEPHONE (OUTPATIENT)
Dept: PHYSICAL THERAPY | Facility: HOSPITAL | Age: 53
End: 2023-05-01

## 2023-05-01 ENCOUNTER — APPOINTMENT (OUTPATIENT)
Dept: PHYSICAL THERAPY | Facility: HOSPITAL | Age: 53
End: 2023-05-01
Attending: FAMILY MEDICINE
Payer: COMMERCIAL

## 2023-05-03 ENCOUNTER — OFFICE VISIT (OUTPATIENT)
Dept: OBGYN CLINIC | Facility: CLINIC | Age: 53
End: 2023-05-03
Payer: COMMERCIAL

## 2023-05-03 VITALS
BODY MASS INDEX: 45.93 KG/M2 | DIASTOLIC BLOOD PRESSURE: 80 MMHG | SYSTOLIC BLOOD PRESSURE: 138 MMHG | HEIGHT: 63 IN | WEIGHT: 259.25 LBS

## 2023-05-03 DIAGNOSIS — R39.15 URGENCY OF URINATION: ICD-10-CM

## 2023-05-03 DIAGNOSIS — Z12.4 PAP SMEAR FOR CERVICAL CANCER SCREENING: ICD-10-CM

## 2023-05-03 DIAGNOSIS — Z01.419 VISIT FOR GYNECOLOGIC EXAMINATION: Primary | ICD-10-CM

## 2023-05-03 DIAGNOSIS — N39.3 PRIMARY STRESS URINARY INCONTINENCE: ICD-10-CM

## 2023-05-03 PROCEDURE — 87624 HPV HI-RISK TYP POOLED RSLT: CPT | Performed by: OBSTETRICS & GYNECOLOGY

## 2023-05-03 PROCEDURE — 3075F SYST BP GE 130 - 139MM HG: CPT | Performed by: OBSTETRICS & GYNECOLOGY

## 2023-05-03 PROCEDURE — 99396 PREV VISIT EST AGE 40-64: CPT | Performed by: OBSTETRICS & GYNECOLOGY

## 2023-05-03 PROCEDURE — 3079F DIAST BP 80-89 MM HG: CPT | Performed by: OBSTETRICS & GYNECOLOGY

## 2023-05-03 PROCEDURE — 3008F BODY MASS INDEX DOCD: CPT | Performed by: OBSTETRICS & GYNECOLOGY

## 2023-05-04 LAB — HPV I/H RISK 1 DNA SPEC QL NAA+PROBE: NEGATIVE

## 2023-05-08 ENCOUNTER — APPOINTMENT (OUTPATIENT)
Dept: PHYSICAL THERAPY | Facility: HOSPITAL | Age: 53
End: 2023-05-08
Attending: FAMILY MEDICINE
Payer: COMMERCIAL

## 2023-05-08 ENCOUNTER — PROCEDURE VISIT (OUTPATIENT)
Dept: PHYSICAL MEDICINE AND REHAB | Facility: CLINIC | Age: 53
End: 2023-05-08
Payer: COMMERCIAL

## 2023-05-08 DIAGNOSIS — G56.03 BILATERAL CARPAL TUNNEL SYNDROME: Primary | ICD-10-CM

## 2023-05-08 PROCEDURE — 95911 NRV CNDJ TEST 9-10 STUDIES: CPT | Performed by: PHYSICAL MEDICINE & REHABILITATION

## 2023-05-08 PROCEDURE — 95886 MUSC TEST DONE W/N TEST COMP: CPT | Performed by: PHYSICAL MEDICINE & REHABILITATION

## 2023-05-10 ENCOUNTER — HOSPITAL ENCOUNTER (OUTPATIENT)
Dept: MAMMOGRAPHY | Facility: HOSPITAL | Age: 53
Discharge: HOME | End: 2023-05-10
Attending: FAMILY MEDICINE
Payer: COMMERCIAL

## 2023-05-10 DIAGNOSIS — Z12.31 VISIT FOR SCREENING MAMMOGRAM: ICD-10-CM

## 2023-05-10 PROCEDURE — 77063 BREAST TOMOSYNTHESIS BI: CPT | Performed by: FAMILY MEDICINE

## 2023-05-10 PROCEDURE — 77067 SCR MAMMO BI INCL CAD: CPT | Performed by: FAMILY MEDICINE

## 2023-05-15 ENCOUNTER — OFFICE VISIT (OUTPATIENT)
Dept: PHYSICAL THERAPY | Facility: HOSPITAL | Age: 53
End: 2023-05-15
Attending: FAMILY MEDICINE
Payer: COMMERCIAL

## 2023-05-15 PROCEDURE — 97110 THERAPEUTIC EXERCISES: CPT

## 2023-05-15 PROCEDURE — 97140 MANUAL THERAPY 1/> REGIONS: CPT

## 2023-05-19 RX ORDER — RIMEGEPANT SULFATE 75 MG/75MG
TABLET, ORALLY DISINTEGRATING ORAL
COMMUNITY
Start: 2023-05-15

## 2023-05-22 ENCOUNTER — OFFICE VISIT (OUTPATIENT)
Dept: PHYSICAL THERAPY | Facility: HOSPITAL | Age: 53
End: 2023-05-22
Attending: FAMILY MEDICINE
Payer: COMMERCIAL

## 2023-05-22 PROCEDURE — 97140 MANUAL THERAPY 1/> REGIONS: CPT

## 2023-05-22 PROCEDURE — 97110 THERAPEUTIC EXERCISES: CPT

## 2023-05-23 ENCOUNTER — OFFICE VISIT (OUTPATIENT)
Dept: UROLOGY | Facility: HOSPITAL | Age: 53
End: 2023-05-23
Attending: OBSTETRICS & GYNECOLOGY
Payer: COMMERCIAL

## 2023-05-23 VITALS — RESPIRATION RATE: 20 BRPM | HEIGHT: 63 IN | BODY MASS INDEX: 45.89 KG/M2 | WEIGHT: 259 LBS

## 2023-05-23 DIAGNOSIS — R35.1 NOCTURIA: ICD-10-CM

## 2023-05-23 DIAGNOSIS — N39.41 URGE INCONTINENCE: Primary | ICD-10-CM

## 2023-05-23 DIAGNOSIS — E66.01 MORBID OBESITY (HCC): ICD-10-CM

## 2023-05-23 DIAGNOSIS — R35.0 FREQUENCY OF MICTURITION: ICD-10-CM

## 2023-05-23 PROBLEM — D21.9 FIBROIDS: Status: RESOLVED | Noted: 2020-12-09 | Resolved: 2023-05-23

## 2023-05-23 LAB
BLOOD URINE: NEGATIVE
CONTROL RUN WITHIN 24 HOURS?: YES
LEUKOCYTE ESTERASE URINE: NEGATIVE
NITRITE URINE: NEGATIVE

## 2023-05-23 PROCEDURE — 99212 OFFICE O/P EST SF 10 MIN: CPT

## 2023-05-23 PROCEDURE — 81002 URINALYSIS NONAUTO W/O SCOPE: CPT | Performed by: OBSTETRICS & GYNECOLOGY

## 2023-05-23 PROCEDURE — 87086 URINE CULTURE/COLONY COUNT: CPT | Performed by: OBSTETRICS & GYNECOLOGY

## 2023-05-23 RX ORDER — MIRABEGRON 50 MG/1
50 TABLET, FILM COATED, EXTENDED RELEASE ORAL DAILY
Qty: 30 TABLET | Refills: 2 | Status: SHIPPED | OUTPATIENT
Start: 2023-05-23 | End: 2023-08-21

## 2023-05-24 ENCOUNTER — APPOINTMENT (OUTPATIENT)
Dept: MRI IMAGING | Facility: HOSPITAL | Age: 53
End: 2023-05-24
Attending: EMERGENCY MEDICINE
Payer: COMMERCIAL

## 2023-05-24 ENCOUNTER — HOSPITAL ENCOUNTER (EMERGENCY)
Facility: HOSPITAL | Age: 53
Discharge: HOME | End: 2023-05-24
Attending: EMERGENCY MEDICINE
Payer: COMMERCIAL

## 2023-05-24 VITALS
HEIGHT: 63 IN | WEIGHT: 230 LBS | DIASTOLIC BLOOD PRESSURE: 84 MMHG | BODY MASS INDEX: 40.75 KG/M2 | SYSTOLIC BLOOD PRESSURE: 120 MMHG | RESPIRATION RATE: 20 BRPM | HEART RATE: 82 BPM | TEMPERATURE: 97 F | OXYGEN SATURATION: 98 %

## 2023-05-24 DIAGNOSIS — G43.909 NONINTRACTABLE CHRONIC MIGRAINE: Primary | ICD-10-CM

## 2023-05-24 DIAGNOSIS — R20.2 PARESTHESIAS: ICD-10-CM

## 2023-05-24 PROCEDURE — 99285 EMERGENCY DEPT VISIT HI MDM: CPT

## 2023-05-24 PROCEDURE — 99284 EMERGENCY DEPT VISIT MOD MDM: CPT

## 2023-05-24 PROCEDURE — 70551 MRI BRAIN STEM W/O DYE: CPT | Performed by: EMERGENCY MEDICINE

## 2023-05-24 RX ORDER — ATORVASTATIN CALCIUM 20 MG/1
TABLET, FILM COATED ORAL
Qty: 90 TABLET | Refills: 3 | Status: SHIPPED | OUTPATIENT
Start: 2023-05-24

## 2023-05-24 NOTE — TELEPHONE ENCOUNTER
Refill passed per [employer], [facility] protocol.   Requested Prescriptions   Pending Prescriptions Disp Refills    ATORVASTATIN 20 MG Oral Tab [Pharmacy Med Name: ATORVASTATIN 20MG TABLETS] 90 tablet 0     Sig: TAKE 1 TABLET(20 MG) BY MOUTH EVERY NIGHT       Cholesterol Medication Protocol Passed - 5/23/2023 11:45 PM        Passed - ALT in past 12 months        Passed - LDL in past 12 months        Passed - Last ALT < 80     Lab Results   Component Value Date    ALT 41 04/07/2023             Passed - Last LDL < 130     Lab Results   Component Value Date     (H) 04/07/2023               Passed - In person appointment or virtual visit in the past 12 mos or appointment in next 3 mos     Recent Outpatient Visits              Yesterday Urge incontinence    [facility] [address] [place] [address] Urogynecology John Cartwright MD    Office Visit    2 days ago     [address], [place]    Office Visit    1 week ago     [facility] [address], [place]    Office Visit    3 weeks ago Visit for gynecologic examination    [address] [facility], [facility], [address] [place] [address],     Office Visit    3 weeks ago Hypersomnia    [address], [address]    Office Visit          Future Appointments         Provider Department Appt Notes    Tomorrow [facility], [address] The lab work ordered by Dr Mehul Almeida    In 6 days [address] [facility] [id number] test auth # [id number] from 5/10/23-7/8/23 Rep Palmer Hieu    In 1 week Doren Kayser, [facility] (neck) BCBS PPO  c/p $25, no auth, no limit    In 1 week Doren Kayser, [facility] (neck) BCBS PPO  c/p $25, no auth, no limit    In 2 weeks GI COLON SCREENING [address], [address] [facility],[address] Floor, [address] [phone] In 2 weeks [facility], [address] (neck) BCBS PPO  c/p $25, no auth, no limit    In 2 weeks DO Tacho De La Rosa [place], [address] 2 mos f/u    In 3 weeks [facility], [address] (neck) BCBS PPO  c/p $25, no auth, no limit    In 3 weeks [facility], [address] (neck) BCBS PPO  c/p $25, no auth, no limit    In 3 weeks Jennifer Santizo MD [place] emg f/u    In 4 weeks [facility], [address] (neck) BCBS PPO  c/p $25, no auth, no limit    In 1 month [facility], [address] (neck) BCBS PPO  c/p $25, no auth, no limit    In 1 month [facility], [address] (knee) BCBS PPO  c/p $25, no auth, no limit    In 1 month [facility], [address] (knee) BCBS PPO  c/p $25, no auth, no limit    In 1 month [facility], [address] (knee) BCBS PPO  c/p $25, no auth, no limit    In 1 month [facility], [address] (knee) BCBS PPO  c/p $25, no auth, no limit    In 1 month [facility], [address] (knee) BCBS PPO  c/p $25, no auth, no limit    In 1 month [address], [address] 2-6 mon f/u shingle shot 2 dose    In 1 month [facility], [address] (knee) BCBS PPO  c/p $25, no auth, no limit    In 2 months Teodoro Osgood, MD [facility] 2- month f/u Tele  [phone], per Dr. Elton Sanders

## 2023-05-24 NOTE — ED INITIAL ASSESSMENT (HPI)
Pt arrived to ED from home c/o migraine, nausea, dizziness, confusion, memory lapses and smelling baby power starting today at approximately 1430. Pt reports she had hyluronic acid shots to bilateral knees at approximately 0815 this morning.

## 2023-05-25 ENCOUNTER — TELEPHONE (OUTPATIENT)
Dept: UROLOGY | Facility: HOSPITAL | Age: 53
End: 2023-05-25

## 2023-05-25 ENCOUNTER — PATIENT MESSAGE (OUTPATIENT)
Dept: NEUROLOGY | Facility: CLINIC | Age: 53
End: 2023-05-25

## 2023-05-25 NOTE — TELEPHONE ENCOUNTER
Outgoing telephone call to the patient. Informed the patient that the urine culture obtained on 05/23/2023 has been reviewed by Dr. Natalie Chino and test shows no significant abnormalities. No growth. No treatment is necessary at this time. Encouraged to call the office with questions or concerns at [phone]. Patient verbalized understanding.

## 2023-05-25 NOTE — TELEPHONE ENCOUNTER
From: Yusef Muir  To: Rebecca Jean   Sent: 5/25/2023 12:57 PM CDT  Subject: ER Visit due to Stroke-Like Symptoms (but not a stroke)    Yesterday around 3:30pm, I became nauseated & could feel the onset of a migraine. I didn't have my rescue meds with me, so I took some IBProfin. But the pain got worse, I became dizzy, and I started exhibiting signs of distress. My coworkers drove me home & observed that I was confused & experiencing word aphasia. As we were walking to the house, my balance was off & I almost collapsed. My speech was slow & became stuttering as words were hard to form. My partner & coworkers took me to the ER. Dr Sid Wong had an MRI done and determined it was not a stroke, but the most severe migraine I've ever had. Once I got home, I took the rescue medication (Nurtec) and within half an hour, my speech was better & the pain was less. There's still pain now, but much less, more like the migraines I usually have. They wanted me to call Dr Chris Melvin and Dr Yanes Said, but my head is still a mass of pain & my tinnitus is loud so I'm writing.      Thank you,  Dhaval Vásquez

## 2023-05-26 ENCOUNTER — TELEMEDICINE (OUTPATIENT)
Dept: NEUROLOGY | Facility: CLINIC | Age: 53
End: 2023-05-26
Payer: COMMERCIAL

## 2023-05-26 DIAGNOSIS — G43.109 CHRONIC MIGRAINE WITH AURA: Primary | ICD-10-CM

## 2023-05-26 DIAGNOSIS — R44.2 OLFACTORY HALLUCINATIONS: ICD-10-CM

## 2023-05-27 ENCOUNTER — PATIENT MESSAGE (OUTPATIENT)
Dept: NEUROLOGY | Facility: CLINIC | Age: 53
End: 2023-05-27

## 2023-05-27 RX ORDER — PROPRANOLOL HYDROCHLORIDE 20 MG/1
20 TABLET ORAL 2 TIMES DAILY
Qty: 180 TABLET | Refills: 0 | Status: SHIPPED | OUTPATIENT
Start: 2023-05-27 | End: 2023-08-25

## 2023-05-27 RX ORDER — DIVALPROEX SODIUM 500 MG/1
TABLET, EXTENDED RELEASE ORAL
Qty: 21 TABLET | Refills: 0 | Status: SHIPPED | OUTPATIENT
Start: 2023-05-27 | End: 2023-06-10

## 2023-05-27 RX ORDER — GALCANEZUMAB 120 MG/ML
INJECTION, SOLUTION SUBCUTANEOUS
Qty: 2 EACH | Refills: 0 | Status: SHIPPED | OUTPATIENT
Start: 2023-05-27 | End: 2023-06-24

## 2023-05-27 RX ORDER — GALCANEZUMAB 120 MG/ML
120 INJECTION, SOLUTION SUBCUTANEOUS
Qty: 1 EACH | Refills: 0 | Status: SHIPPED | OUTPATIENT
Start: 2023-06-27 | End: 2024-06-26

## 2023-05-27 RX ORDER — PREDNISONE 20 MG/1
TABLET ORAL
Qty: 24 TABLET | Refills: 0 | Status: SHIPPED | OUTPATIENT
Start: 2023-05-27 | End: 2023-06-08

## 2023-05-30 ENCOUNTER — APPOINTMENT (OUTPATIENT)
Dept: PHYSICAL THERAPY | Facility: HOSPITAL | Age: 53
End: 2023-05-30
Attending: FAMILY MEDICINE
Payer: COMMERCIAL

## 2023-05-30 ENCOUNTER — OFFICE VISIT (OUTPATIENT)
Dept: SLEEP CENTER | Age: 53
End: 2023-05-30
Attending: INTERNAL MEDICINE
Payer: COMMERCIAL

## 2023-05-30 DIAGNOSIS — G47.10 HYPERSOMNIA: ICD-10-CM

## 2023-05-30 PROCEDURE — 95810 POLYSOM 6/> YRS 4/> PARAM: CPT

## 2023-05-30 NOTE — TELEPHONE ENCOUNTER
From: Jonathan Bojorquez  To: Zeynep Plasencia,   Sent: 5/27/2023 12:18 PM CDT  Subject: Review    Hello Again,    I would like to write a review / take a survey about the incredible care I've received from Dr Daniel Williamson, but I cannot figure out how to do that. Could you help me do that or should I just send it via a message here?     Thank you,  Cora Chappell

## 2023-05-31 ENCOUNTER — LAB ENCOUNTER (OUTPATIENT)
Dept: LAB | Facility: HOSPITAL | Age: 53
End: 2023-05-31
Attending: Other
Payer: COMMERCIAL

## 2023-05-31 DIAGNOSIS — R20.0 NUMBNESS AND TINGLING: Primary | ICD-10-CM

## 2023-05-31 DIAGNOSIS — R20.2 NUMBNESS AND TINGLING: Primary | ICD-10-CM

## 2023-05-31 DIAGNOSIS — R25.2 CRAMPS, MUSCLE, GENERAL: ICD-10-CM

## 2023-05-31 LAB
CK SERPL-CCNC: 40 U/L
DEPRECATED HBV CORE AB SER IA-ACNC: 49.1 NG/ML
FOLATE SERPL-MCNC: >20 NG/ML (ref 8.7–?)
IGA SERPL-MCNC: 198 MG/DL (ref 70–312)
IGM SERPL-MCNC: 63.4 MG/DL (ref 43–279)
IMMUNOGLOBULIN PNL SER-MCNC: 1180 MG/DL (ref 791–1643)
IRON SATN MFR SERPL: 22 %
IRON SERPL-MCNC: 89 UG/DL
T4 SERPL-MCNC: 8 UG/DL
TIBC SERPL-MCNC: 396 UG/DL (ref 240–450)
TRANSFERRIN SERPL-MCNC: 266 MG/DL (ref 200–360)
VIT B12 SERPL-MCNC: 289 PG/ML (ref 193–986)

## 2023-05-31 PROCEDURE — 84466 ASSAY OF TRANSFERRIN: CPT | Performed by: OTHER

## 2023-05-31 PROCEDURE — 84165 PROTEIN E-PHORESIS SERUM: CPT | Performed by: OTHER

## 2023-05-31 PROCEDURE — 82784 ASSAY IGA/IGD/IGG/IGM EACH: CPT | Performed by: OTHER

## 2023-05-31 PROCEDURE — 83521 IG LIGHT CHAINS FREE EACH: CPT | Performed by: OTHER

## 2023-05-31 PROCEDURE — 82550 ASSAY OF CK (CPK): CPT | Performed by: OTHER

## 2023-05-31 PROCEDURE — 83540 ASSAY OF IRON: CPT | Performed by: OTHER

## 2023-05-31 PROCEDURE — 82607 VITAMIN B-12: CPT | Performed by: OTHER

## 2023-05-31 PROCEDURE — 86334 IMMUNOFIX E-PHORESIS SERUM: CPT | Performed by: OTHER

## 2023-05-31 PROCEDURE — 84446 ASSAY OF VITAMIN E: CPT | Performed by: OTHER

## 2023-05-31 PROCEDURE — 82746 ASSAY OF FOLIC ACID SERUM: CPT | Performed by: OTHER

## 2023-05-31 PROCEDURE — 84436 ASSAY OF TOTAL THYROXINE: CPT | Performed by: OTHER

## 2023-05-31 PROCEDURE — 82728 ASSAY OF FERRITIN: CPT | Performed by: OTHER

## 2023-06-01 ENCOUNTER — OFFICE VISIT (OUTPATIENT)
Dept: PHYSICAL THERAPY | Facility: HOSPITAL | Age: 53
End: 2023-06-01
Attending: Other
Payer: COMMERCIAL

## 2023-06-01 DIAGNOSIS — G44.86 CERVICOGENIC HEADACHE: ICD-10-CM

## 2023-06-01 DIAGNOSIS — M50.30 DEGENERATION OF CERVICAL INTERVERTEBRAL DISC: ICD-10-CM

## 2023-06-01 LAB
KAPPA LC FREE SER-MCNC: 1.32 MG/DL (ref 0.33–1.94)
KAPPA LC FREE/LAMBDA FREE SER NEPH: 1.8 {RATIO} (ref 0.26–1.65)
LAMBDA LC FREE SERPL-MCNC: 0.73 MG/DL (ref 0.57–2.63)

## 2023-06-01 PROCEDURE — 97110 THERAPEUTIC EXERCISES: CPT

## 2023-06-01 PROCEDURE — 97162 PT EVAL MOD COMPLEX 30 MIN: CPT

## 2023-06-02 LAB
ALBUMIN SERPL ELPH-MCNC: 4.54 G/DL (ref 3.75–5.21)
ALBUMIN/GLOB SERPL: 1.58 {RATIO} (ref 1–2)
ALPHA1 GLOB SERPL ELPH-MCNC: 0.27 G/DL (ref 0.19–0.46)
ALPHA2 GLOB SERPL ELPH-MCNC: 0.7 G/DL (ref 0.48–1.05)
B-GLOBULIN SERPL ELPH-MCNC: 0.75 G/DL (ref 0.68–1.23)
GAMMA GLOB SERPL ELPH-MCNC: 1.14 G/DL (ref 0.62–1.7)
PROT SERPL-MCNC: 7.4 G/DL (ref 6.4–8.2)

## 2023-06-03 LAB
VIT E ALPHA TOCO: 9.7 MG/L
VIT E GAMMA TOCO: 1.7 MG/L

## 2023-06-03 RX ORDER — LISINOPRIL 10 MG/1
10 TABLET ORAL NIGHTLY
Qty: 90 TABLET | Refills: 3 | Status: SHIPPED | OUTPATIENT
Start: 2023-06-03

## 2023-06-03 NOTE — TELEPHONE ENCOUNTER
Refill passed per [place] [facility], [facility] protocol.     Requested Prescriptions   Pending Prescriptions Disp Refills    LISINOPRIL 10 MG Oral Tab [Pharmacy Med Name: LISINOPRIL 10MG TABLETS] 90 tablet 1     Sig: TAKE 1 TABLET(10 MG) BY MOUTH EVERY NIGHT       Hypertensive Medications Protocol Passed - 6/3/2023  3:36 AM        Passed - In person appointment in the past 12 or next 3 months     Recent Outpatient Visits              2 days ago     [facility] [address], [address]    Office Visit    4 days ago Ata Josue    Office Visit    1 week ago Chronic migraine with aura    [facility], [address],[place] [place], [place]    Telemedicine    1 week ago Urge incontinence    [facility] [address] Urogynecology John Cartwright MD    Office Visit    1 week ago     [facility] [place], [address]    Office Visit          Future Appointments         Provider Department Appt Notes    In 2 days Doren Kayser, [address] (neck) BCBS PPO  c/p $25, no auth, no limit    In 6 days GI [id number] [place] [address], [address] [facility] [address], [address] [phone]    In 1 week Doren Kayser, [address] (neck) BCBS PPO  c/p $25, no auth, no limit    In 1 week Jake Callahan DO [address], [facility], [place] 2 mos f/u    In 1 week Doren Kayser, [address] (neck) BCBS PPO  c/p $25, no auth, no limit    In 2 weeks Doren Kayser, [address] (neck) BCBS PPO  c/p $25, no auth, no limit    In 2 weeks Susanne Hashimoto, MD [address], [address], [place] emg f/u    In 2 weeks Doren Kayser, [address] (neck) BCBS PPO  c/p $25, no auth, no limit    In 3 weeks Doren Kayser, [address] (neck) BCBS PPO  c/p $25, no Maged Home, no limit    In 3 weeks Ginny Drum, [address] (knee) BCBS PPO  c/p $25, no auth, no limit    In 1 month Ginny Drum, [address] (knee) BCBS PPO  c/p $25, no auth, no limit    In 1 month Ginny Drum, [address] (knee) BCBS PPO  c/p $25, no auth, no limit    In 1 month Ginny Drum, [address] (knee) BCBS PPO  c/p $25, no auth, no limit    In 1 month Ginny Drum, [address] (knee) BCBS PPO  c/p $25, no auth, no limit    In 1 month [address], [facility], [facility] 2-6 mon f/u shingle shot 2 dose    In 1 month Ginny Drum, [address] (knee) BCBS PPO  c/p $25, no auth, no limit    In 1 month Jimena Hawthorne MD [facility] [address] [facility] Urogynecology 2- month f/u Tele  [phone], per Dr. Tariq Markham    In 2 months [facility], [address], [place] Follow-up on Sleep Study               Passed - Last BP reading less than 140/90     BP Readings from Last 1 Encounters:  05/24/23 : 120/84                Passed - CMP or BMP in past 6 months     Recent Results (from the past 4392 hour(s))   COMP METABOLIC PANEL (14)    Collection Time: 04/07/23 11:18 AM   Result Value Ref Range    Glucose 108 (H) 70 - 99 mg/dL    Sodium 140 136 - 145 mmol/L    Potassium 4.2 3.5 - 5.1 mmol/L    Chloride 107 98 - 112 mmol/L    CO2 26.0 21.0 - 32.0 mmol/L    Anion Gap 7 0 - 18 mmol/L    BUN 15 7 - 18 mg/dL    Creatinine 0.75 Male 0.70-1.30 :  Female 0.55-1.02 mg/dL    BUN/CREA Ratio 20.0 10.0 - 20.0    Calcium, Total 9.7 8.5 - 10.1 mg/dL    Calculated Osmolality 291 275 - 295 mOsm/kg    eGFR-Cr 97 >=60 mL/min/1.73m2    ALT 41 Male 16-61 : Female 13-56 U/L    AST 15 15 - 37 U/L    Alkaline Phosphatase 83 Male  : Female  U/L    Bilirubin, Total 0.6 0.1 - 2.0 mg/dL Total Protein 7.5 6.4 - 8.2 g/dL    Albumin 4.0 3.4 - 5.0 g/dL    Globulin  3.5 2.8 - 4.4 g/dL    A/G Ratio 1.1 1.0 - 2.0    Patient Fasting for CMP? Yes      *Note: Due to a large number of results and/or encounters for the requested time period, some results have not been displayed. A complete set of results can be found in Results Review.                  Passed - In person appointment or virtual visit in the past 6 months     Recent Outpatient Visits              2 days ago     [facility] [address], [address]    Office Visit    4 days ago Ata Martinezie    Office Visit    1 week ago Chronic migraine with aura    [facility], [address] [facility] [address],[place] [place], [place]    Telemedicine    1 week ago Urge incontinence    [facility] Manjit Clayton MD    Office Visit    1 week ago     [facility] [address], [address]    Office Visit          Future Appointments         Provider Department Appt Notes    In 2 days Carolrenaee Makos, [address] (neck) BCBS PPO  c/p $25, no auth, no limit    In 6 days GI [address] [facility], [address] [facility] [address], [facility] [phone]    In 1 week Carolanne Makos, [address] (neck) BCBS PPO  c/p $25, no auth, no limit    In 1 week Bryan Mendez DO [address], [address], [place] 2 mos f/u    In 1 week Carolanne Makos, [address] (neck) BCBS PPO  c/p $25, no auth, no limit    In 2 weeks Carolanne Makos, [address] (neck) BCBS PPO  c/p $25, no auth, no limit    In 2 weeks Evie Baltazar MD [address], [address], [employer] emg f/u    In 2 weeks Carolanne Makos, [address] (neck) BCBS PPO  c/p $25, no auth, no limit    In 3 weeks Magali Soares [facility] (neck) BCBS PPO  c/p $25, no auth, no limit    In 3 weeks Jass Gutierrez, [address] (knee) BCBS PPO  c/p $25, no auth, no limit    In 1 month Jass Gutierrez, [address] (knee) BCBS PPO  c/p $25, no auth, no limit    In 1 month Jass Gutierrez, [address] (knee) BCBS PPO  c/p $25, no auth, no limit    In 1 month Jass Gutierrez, [address] (knee) BCBS PPO  c/p $25, no auth, no limit    In 1 month Jass Gutierrez, [address] (knee) BCBS PPO  c/p $25, no auth, no limit    In 1 month [address] 2-6 mon f/u shingle shot 2 dose    In 1 month Jass Gutierrez, [address] (knee) BCBS PPO  c/p $25, no auth, no limit    In 1 month Yue Klein MD [facility] [address] [facility] Urogynecology 2- month f/u Tele  [phone], per Dr. Ulysses Cadena    In 2 months Ryan Garcia DO [facility], [address], [place] Follow-up on Sleep Study               [facility] or GFRNAA > 50     GFR Evaluation  EGFRCR: 97 , resulted on 4/7/2023                 Recent Outpatient Visits              2 days ago     [facility] [address], [place]    Office Visit    4 days ago Hypersomnia    [address] [facility]    Office Visit    1 week ago Chronic migraine with aura    [facility], [address] [facility] [address], [facility],     Telemedicine    1 week ago Urge incontinence    [facility] [address] [facility] Urogynecology Yue Klein MD    Office Visit    1 week ago     [address], [address] [facility]    Office Visit            Future Appointments         Provider Department Appt Notes    In 2 days Jass Gutierrez, [address] (neck) BCBS PPO  c/p $25, no auth, no limit    In 6 days GI COLON SCREENING [facility], [address], [facility] [phone]    In 1 week Elfreda Northern, [address] (neck) BCBS PPO  c/p $25, no auth, no limit    In 1 week DO Gavino Gomez, [address] 2 mos f/u    In 1 week Elfreda Northern, [address] (neck) BCBS PPO  c/p $25, no auth, no limit    In 2 weeks Elfreda Northern, [address] (neck) BCBS PPO  c/p $25, no auth, no limit    In 2 weeks MD Gavino Terry, [address], [place] emg f/u    In 2 weeks Elfreda Northern, [address] (neck) BCBS PPO  c/p $25, no auth, no limit    In 3 weeks Elfreda Northern, [address] (neck) BCBS PPO  c/p $25, no auth, no limit    In 3 weeks Elfreda Northern, [address] (knee) BCBS PPO  c/p $25, no auth, no limit    In 1 month Elfreda Northern, [address] (knee) BCBS PPO  c/p $25, no auth, no limit    In 1 month Elfreda Northern, [address] (knee) BCBS PPO  c/p $25, no auth, no limit    In 1 month Elfreda Northern, [address] (knee) BCBS PPO  c/p $25, no auth, no limit    In 1 month Elfreda Northern, [address] (knee) BCBS PPO  c/p $25, no auth, no limit    In 1 month [address] [facility] 2-6 mon f/u shingle shot 2 dose    In 1 month Elfreda Northern, [address] (knee) BCBS PPO  c/p $25, no auth, no limit    In 1 month Joni Harper MD [facility] [address] [place] [address] Urogynecology 2- month f/u Tele  [phone], per Dr. Estiven Tse    In 2 months DO Gavino Farmer, [address], [place] Follow-up on Sleep Study

## 2023-06-04 LAB — METHYLMALONIC ACID: 267 NMOL/L

## 2023-06-05 ENCOUNTER — TELEPHONE (OUTPATIENT)
Dept: NEUROLOGY | Facility: CLINIC | Age: 53
End: 2023-06-05

## 2023-06-05 ENCOUNTER — OFFICE VISIT (OUTPATIENT)
Dept: PHYSICAL THERAPY | Facility: HOSPITAL | Age: 53
End: 2023-06-05
Attending: Other
Payer: COMMERCIAL

## 2023-06-05 PROCEDURE — 97140 MANUAL THERAPY 1/> REGIONS: CPT

## 2023-06-05 PROCEDURE — 97110 THERAPEUTIC EXERCISES: CPT

## 2023-06-05 NOTE — TELEPHONE ENCOUNTER
Patient called regarding the prescription:  Galcanezumab-gnlm (EMGALITY) 120 MG/ML Subcutaneous Solution Auto-injector    She said she picked it up and the instructions say she should initially take 240 MG and be observed. She only has the 120 MG and isn't sure how to proceed. Please let her know.

## 2023-06-05 NOTE — TELEPHONE ENCOUNTER
Phone call to pt pharmacy, spoke to Linda pharmacist. Linda advised this RN that the remainder of the pt loading dose for Emgality will be available tomorrow for the pt to . LMTCB patient.

## 2023-06-08 ENCOUNTER — APPOINTMENT (OUTPATIENT)
Dept: PHYSICAL THERAPY | Facility: HOSPITAL | Age: 53
End: 2023-06-08
Attending: Other
Payer: COMMERCIAL

## 2023-06-09 ENCOUNTER — NURSE ONLY (OUTPATIENT)
Facility: CLINIC | Age: 53
End: 2023-06-09

## 2023-06-09 ENCOUNTER — TELEPHONE (OUTPATIENT)
Dept: PULMONOLOGY | Facility: CLINIC | Age: 53
End: 2023-06-09

## 2023-06-09 DIAGNOSIS — G43.101 MIGRAINE WITH AURA AND WITH STATUS MIGRAINOSUS, NOT INTRACTABLE: ICD-10-CM

## 2023-06-09 DIAGNOSIS — G47.33 OSA (OBSTRUCTIVE SLEEP APNEA): Primary | ICD-10-CM

## 2023-06-09 RX ORDER — VENLAFAXINE HYDROCHLORIDE 150 MG/1
CAPSULE, EXTENDED RELEASE ORAL
Qty: 90 CAPSULE | Refills: 0 | Status: SHIPPED | OUTPATIENT
Start: 2023-06-09

## 2023-06-09 NOTE — TELEPHONE ENCOUNTER
Spoke with patient, informed her of Dr. Perea [place] result note/order, patient verbalized understanding, agreeable to Auto CPAP, states she is both a mouth and nose breather.       Dr. Nahum Velasquez - Please enter autoPAP pressure in order pended below

## 2023-06-09 NOTE — TELEPHONE ENCOUNTER
----- Message from Scarlet Fulton DO sent at 6/8/2023 10:41 AM CDT -----  You may let the patient know that I reviewed her sleep study results with moderate sleep apnea. I recommend auto CPAP for her. Let me know if she is agreeable and I will place order.   Also can we ask her if she sleeps with her mouth open or close to know which kind of mask we should place order for

## 2023-06-09 NOTE — TELEPHONE ENCOUNTER
LOV 5/26/23   NOV 6/13/23    Refill request for pt venlafaxine  MG Oral Capsule SR 24 Hr, reviewed by RN and routed to provider for review.

## 2023-06-09 NOTE — TELEPHONE ENCOUNTER
CPAP order, face sheet, baseline sleep study and office visit notes faxed to [facility] at [phone]. Fax confirmation received.

## 2023-06-13 ENCOUNTER — OFFICE VISIT (OUTPATIENT)
Dept: PHYSICAL THERAPY | Facility: HOSPITAL | Age: 53
End: 2023-06-13
Attending: Other
Payer: COMMERCIAL

## 2023-06-13 PROCEDURE — 97110 THERAPEUTIC EXERCISES: CPT

## 2023-06-13 PROCEDURE — 97140 MANUAL THERAPY 1/> REGIONS: CPT

## 2023-06-13 NOTE — PROGRESS NOTES
Dx: average risk crc screening  Colonoscopy with MAC sedation  Split dose golytely preparation, sent to pharmacy  Please review prep instructions with patient      - NO herbal supplements or weight loss medications x 7 days prior to the procedure(s)

## 2023-06-15 ENCOUNTER — APPOINTMENT (OUTPATIENT)
Dept: PHYSICAL THERAPY | Facility: HOSPITAL | Age: 53
End: 2023-06-15
Attending: Other
Payer: COMMERCIAL

## 2023-06-16 ENCOUNTER — TELEMEDICINE (OUTPATIENT)
Dept: NEUROLOGY | Facility: CLINIC | Age: 53
End: 2023-06-16
Payer: COMMERCIAL

## 2023-06-16 DIAGNOSIS — G43.111 INTRACTABLE MIGRAINE WITH AURA WITH STATUS MIGRAINOSUS: Primary | ICD-10-CM

## 2023-06-16 RX ORDER — RIMEGEPANT SULFATE 75 MG/75MG
75 TABLET, ORALLY DISINTEGRATING ORAL AS NEEDED
Qty: 8 TABLET | Refills: 11 | Status: SHIPPED | OUTPATIENT
Start: 2023-06-16 | End: 2024-06-15

## 2023-06-19 ENCOUNTER — APPOINTMENT (OUTPATIENT)
Dept: PHYSICAL THERAPY | Facility: HOSPITAL | Age: 53
End: 2023-06-19
Attending: Other
Payer: COMMERCIAL

## 2023-06-22 ENCOUNTER — APPOINTMENT (OUTPATIENT)
Dept: PHYSICAL THERAPY | Facility: HOSPITAL | Age: 53
End: 2023-06-22
Attending: Other
Payer: COMMERCIAL

## 2023-06-26 DIAGNOSIS — G43.101 MIGRAINE WITH AURA AND WITH STATUS MIGRAINOSUS, NOT INTRACTABLE: ICD-10-CM

## 2023-06-26 RX ORDER — VENLAFAXINE HYDROCHLORIDE 150 MG/1
150 CAPSULE, EXTENDED RELEASE ORAL DAILY
Qty: 90 CAPSULE | Refills: 0 | Status: SHIPPED | OUTPATIENT
Start: 2023-06-26

## 2023-06-26 NOTE — TELEPHONE ENCOUNTER
Requested Prescriptions     Pending Prescriptions Disp Refills    VENLAFAXINE  MG Oral Capsule SR 24 Hr [Pharmacy Med Name: VENLAFAXINE ER 150MG CAPSULES] 90 capsule 0     Sig: TAKE 1 CAPSULE BY MOUTH DAILY AFTER COMPLETION OF 37.5MG CAPSULES       LOV: 6/16/23  NOV: none    Last refill/[place]: 6/9/23 QTY 90

## 2023-06-27 ENCOUNTER — TELEPHONE (OUTPATIENT)
Dept: PHYSICAL THERAPY | Facility: HOSPITAL | Age: 53
End: 2023-06-27

## 2023-06-27 ENCOUNTER — OFFICE VISIT (OUTPATIENT)
Dept: PHYSICAL THERAPY | Facility: HOSPITAL | Age: 53
End: 2023-06-27
Attending: Other
Payer: COMMERCIAL

## 2023-06-27 PROCEDURE — 97140 MANUAL THERAPY 1/> REGIONS: CPT

## 2023-06-27 PROCEDURE — 97110 THERAPEUTIC EXERCISES: CPT

## 2023-06-28 ENCOUNTER — OFFICE VISIT (OUTPATIENT)
Dept: PHYSICAL THERAPY | Facility: HOSPITAL | Age: 53
End: 2023-06-28
Attending: ORTHOPAEDIC SURGERY
Payer: COMMERCIAL

## 2023-06-28 ENCOUNTER — LAB ENCOUNTER (OUTPATIENT)
Dept: LAB | Age: 53
End: 2023-06-28
Attending: FAMILY MEDICINE
Payer: COMMERCIAL

## 2023-06-28 ENCOUNTER — OFFICE VISIT (OUTPATIENT)
Dept: FAMILY MEDICINE CLINIC | Facility: CLINIC | Age: 53
End: 2023-06-28

## 2023-06-28 VITALS
OXYGEN SATURATION: 98 % | WEIGHT: 253 LBS | RESPIRATION RATE: 18 BRPM | HEIGHT: 63 IN | DIASTOLIC BLOOD PRESSURE: 80 MMHG | SYSTOLIC BLOOD PRESSURE: 130 MMHG | HEART RATE: 70 BPM | BODY MASS INDEX: 44.83 KG/M2

## 2023-06-28 DIAGNOSIS — A08.4 VIRAL GASTROENTERITIS: ICD-10-CM

## 2023-06-28 DIAGNOSIS — G43.101 MIGRAINE WITH AURA AND WITH STATUS MIGRAINOSUS, NOT INTRACTABLE: ICD-10-CM

## 2023-06-28 DIAGNOSIS — K21.9 GASTROESOPHAGEAL REFLUX DISEASE, UNSPECIFIED WHETHER ESOPHAGITIS PRESENT: Primary | ICD-10-CM

## 2023-06-28 DIAGNOSIS — K21.9 GASTROESOPHAGEAL REFLUX DISEASE, UNSPECIFIED WHETHER ESOPHAGITIS PRESENT: ICD-10-CM

## 2023-06-28 PROCEDURE — 99214 OFFICE O/P EST MOD 30 MIN: CPT | Performed by: FAMILY MEDICINE

## 2023-06-28 PROCEDURE — 83013 H PYLORI (C-13) BREATH: CPT

## 2023-06-28 PROCEDURE — 90750 HZV VACC RECOMBINANT IM: CPT | Performed by: FAMILY MEDICINE

## 2023-06-28 PROCEDURE — 3079F DIAST BP 80-89 MM HG: CPT | Performed by: FAMILY MEDICINE

## 2023-06-28 PROCEDURE — 97140 MANUAL THERAPY 1/> REGIONS: CPT

## 2023-06-28 PROCEDURE — 90471 IMMUNIZATION ADMIN: CPT | Performed by: FAMILY MEDICINE

## 2023-06-28 PROCEDURE — 97110 THERAPEUTIC EXERCISES: CPT

## 2023-06-28 PROCEDURE — 3008F BODY MASS INDEX DOCD: CPT | Performed by: FAMILY MEDICINE

## 2023-06-28 PROCEDURE — 3075F SYST BP GE 130 - 139MM HG: CPT | Performed by: FAMILY MEDICINE

## 2023-06-28 RX ORDER — ONDANSETRON 4 MG/1
4 TABLET, ORALLY DISINTEGRATING ORAL EVERY 8 HOURS PRN
Qty: 15 TABLET | Refills: 0 | Status: SHIPPED | OUTPATIENT
Start: 2023-06-28

## 2023-06-30 ENCOUNTER — TELEPHONE (OUTPATIENT)
Facility: CLINIC | Age: 53
End: 2023-06-30

## 2023-06-30 DIAGNOSIS — Z12.11 SPECIAL SCREENING FOR MALIGNANT NEOPLASMS, COLON: Primary | ICD-10-CM

## 2023-06-30 LAB — H PYLORI BREATH TEST: POSITIVE

## 2023-07-05 ENCOUNTER — APPOINTMENT (OUTPATIENT)
Dept: PHYSICAL THERAPY | Facility: HOSPITAL | Age: 53
End: 2023-07-05
Attending: ORTHOPAEDIC SURGERY
Payer: COMMERCIAL

## 2023-07-05 NOTE — TELEPHONE ENCOUNTER
Scheduled for:  Colonoscopy [place]  Provider Name:  Dr. Zarina Avery  Date:  9/29/2023  Location:  [facility]  Sedation:  MAC  Time:  10:00am, (pt is aware to arrive at 9am)   Prep:  Golytely   Meds/Allergies Reconciled?:  Physician reviewed     Diagnosis with codes:  Colon Cancer Screen Z12.11  Was patient informed to call insurance with codes (Y/N):  Yes, I confirmed [employer] PPO insurance with this patient. Referral sent?:  Referral was sent at the time of electronic surgical scheduling. [address] [place] [address] or [facility] notified?:  I sent an electronic request to Endo Scheduling and received a confirmation today.       Medication Orders:  n/a  Misc Orders:  n/a     Further instructions given by staff:   I discussed the prep instructions with the patient which she verbally understood and is aware that I will send the instructions via [facility]

## 2023-07-10 ENCOUNTER — OFFICE VISIT (OUTPATIENT)
Dept: PHYSICAL THERAPY | Facility: HOSPITAL | Age: 53
End: 2023-07-10
Attending: ORTHOPAEDIC SURGERY
Payer: COMMERCIAL

## 2023-07-10 PROCEDURE — 97140 MANUAL THERAPY 1/> REGIONS: CPT

## 2023-07-10 PROCEDURE — 97110 THERAPEUTIC EXERCISES: CPT

## 2023-07-10 NOTE — PROGRESS NOTES
Referring Physician: Dr. Fely Whitt  Diagnosis:Primary osteoarthritis of both knees (M17.0)  Insurance: [facility]  precautions  :depression, HTN, left knee locking, migraine hx    Date of Onset: 2016,   Initial LEFS: 37.5  Final LEFS: TBD Date of Eval: 2/27/2023   \"GILMA NINE\":   2 years since pain 3/10 or less. Subjective: patient reports pain overall has been less and is doing well with LAQ exercises                Date 4/10/23 4/19/23 4/24/23 5/15/23 5/22/23 6/28/23 7/10/23                    Pain Range Right :less stfff Right and left knees better.  Right and left knees  Right and left knee 2-8/10 Right knee and left knee   Right 4.5-5.5/10  Left: 6-7/10    Right knee 0-5/10  Left knee 3-6.5/10 Right knee  2-4.5/10    Left 2-6/10       Pain  R: 4/10  L: 5/10 R: 4.5/10  L: 4.5/10 R: 4.0/10  L: 4.5/10 R: 6/10  L: 7.5/10 post rx 6/10 R: knee : 4.5/10  L: knee 6/10 Right knee 3/10  Left 4.5/10 Right  and let knee current 2/10 and same                      Objective:    AROM:  initial initial initial 3/6/23 3/20/23 3/29/23 4/10/23 4/24/23 5/15/23 6/28/23 7/12/23   Hip Knee Foot/Ankle Knee R/L Knee R/L Knee R/L Knee R/L Knee R/L Knee R/L Knee R/L  Knee R/L   Flexion: R 95; L 95  Extension: R 0; L 0  Abduction: R WFL; L WFL  ER: R 42*; L 43*    Flexion: R 105*; L 95*103 post rx  Extension: R 0; L -3    DF: R 12; L 13  PF: R WFL; L WFL  INV: R WNL; L WNL  EV: R WNL; L WNL    Flex: 100/101  (post 105 sitting and on  Right//101 deg      Flex: seated 104 deg/105 deg  Flex: 102 seated/106    Flex: 113/Seated: 106 Flex 109/107   Flex: 107/104 Flex: 111-112//105 flex      7/10/23: Spring testing for right and left Sideglides: L greater than R restricted  7/10/23 spring testing for right and left hip IR: left greater than right restriction     Accessory motion: decreased left greater than right patellar mobility  Initial : Flexibility: WNL, min, mod, severe  Hip Flexor: R Mod, L mod  Hamstrings: R mod; L mod  Piriformis: R mod; L mod  Quads: R sev; L sev  Gastroc: R mod; L mod     Strength/MMT:   Hip Knee Foot/Ankle   Flexion: R 4/5*; L 4/5*  Extension: R NT /5; L NT/5  Abduction: R 4/5; L 4/5  ER: R 4/5; L 4/5    Flexion: R 5-/5; L 5-/5  Extension: R 4+/5*; L 4-/5*    DF: R 5/5; L 5/5  PF: R NT/5; L NT/5  INV: R 5-/5; L 5-/5  EV: R 5-/5; L 5-/5         Special tests: Initial : TUG with SPC 13.54 sec      Assessment: patient states that LAQ tolerated well at home. Testing of sideglides left and right with left more restricted than right with spring testing. Also more restricted spring testing of left hip IR than right. Trial Sideglide in left sidelying limited duration due to soreness at left hip. Consider use of pillow folded in lieu of folded towels if helping for HEP   Plan:  See patient for two more sessions of PT for bilateral knees to maximize ROM, strength, improved quad, TFL and hip flexor flexibility, pain reduction, and decreased occurrence of knee locking left. Patient with retest of TUG and strength testing and assess LAQ response and also consideration of patellar stabilization bracing. Assess response to use of sustained positioning      Position Exercise HEP 5/15/23  Visit  11/16 5/22/23  Visits 12/16 6/28/23  Visit 13/16 7/10/23  Visit 14/16   Supine  Left heel slides        Seated  MWM for tibial IR with seated knee flexion left   10x Supine     Supine  SPring testing for Sideglides     X completed   S/L on left  Use of two towels under left hip trial Possibe H    X 1-1:30 mins total held to remove one folded towel   Supine with small bolster Prolonged R and L hip IR fulcrum      X 2 mins to 3 mins each    supine Left /right knee AROM flexion measures  X see above X 10x X completed for above record    supine QS  15 x 5 secs bilat with adjustment for size of towel to be bigger.   H       supine SLR Bilat 2 x 10 H       Seated  heel slides  5-15 reps bilat H 4-5 x each 4-5 x each X bilat X bilat   Seated  Trial bike upright   4 mins     Seated  Nu Step arms and legs seat/arm 9 # level 4   5 mins       X 7:30 mins  X 5 mins   seated Towel squeezes adductors         Supine  RF stretch right and left gentle 2 x 15 secs H       standing TFL stretch instruction left        Seated  LAQ    Left knee 1# and 0# 10x each Left knee 2. 5. # and 2.5# 30x each    Shuttle small arc PS unilat     X 25# 2 x10     Shuttle small arc partial squats bilat      X 4 x 10 30# painfree arcs bilat   standing In II bars with level 3 gastroc stretches         Supine on bolster PROM left knee and AROM to relieve pressure. Trial hip abd and Tibial adduction to assess response          seated and supine PROM left knee flex and extension with lateral unloading    5 x each 10x left supine and 5-10 x seated left 10x left supine and 5-10 x seated left     II bars for gait pattern to  Reduce guarding with short arc knee flexion and avoidance of full extension                       Manual Therapy:         Seated  Left and Right Tibial IR mobs @ 90 deg knee flexion grade 3   X x    Supine  Joint distraction of left Tibial femoral joint  with SAQ   X grade 2-3 X 2-3 reps    supine STM to left TFL and glut medius   X X X  X TFL only   supine STM to left Quad distal   X X X  X    Supine  Patellar left medial glides grade 2-3   X X X  X   Supine STM to left VL   X X X     supine STM to right VL and TFL    X X X left only    Supine STM to bilateral RF   X X              supine US at 1.2w/cm2 to left Distal ITB and lateral retinacular areas and lateral left joint line knee, continuous mode          US at 1.2w/cm2 to right Distal ITB and lateral retinacular areas and lateral left joint line knee, continuous mode                  H = HEP. Pt given copies of all exercise for home program.  X = Exercises done this date - pt verbalized understanding and demonstrated competence. All exercises done B unless otherwise indicated.     6/28/23; patient was given per email info regarding Bioskin patellar stabilization brace and siu home cervical traction  7/10/23 optional use of folded towel or pillow at left hip in left sidelying if tolerated clinic ex well              Charges:  Therap ex x 1 (15 mins) man x 2 ( 30mins ) gait x 0 (0)            Total Timed Treatment: 45 min                      Total Treatment Time: 45 mins

## 2023-07-11 ENCOUNTER — PATIENT MESSAGE (OUTPATIENT)
Dept: NEUROLOGY | Facility: CLINIC | Age: 53
End: 2023-07-11

## 2023-07-11 NOTE — TELEPHONE ENCOUNTER
From: Rell Files  To: Mattie Ornelas DO  Sent: 7/11/2023 9:50 AM CDT  Subject: Emgality    Good Morning,    I injected the loading dose of Emgality on the evening of July 1 and I wanted to let you know how it's going. So far, it doesn't seem to be doing anything other than possibly contributing to my already queasy stomach. I have had at least one migraine a day, and sometimes two or three depending on what other triggering factors are going on. (The 4th of July is not my fav for all the [facility] people set off days before and after with the constant barrage on the day of.) I realize it's only been 10 days, so please let me know if it is too soon to tell. If so, can you also let me know how long it usually takes to kick in? That will help me set my expectations.     Thank you,  Puja Billings

## 2023-07-12 ENCOUNTER — OFFICE VISIT (OUTPATIENT)
Dept: PHYSICAL THERAPY | Facility: HOSPITAL | Age: 53
End: 2023-07-12
Attending: ORTHOPAEDIC SURGERY
Payer: COMMERCIAL

## 2023-07-12 PROCEDURE — 97110 THERAPEUTIC EXERCISES: CPT

## 2023-07-12 PROCEDURE — 97140 MANUAL THERAPY 1/> REGIONS: CPT

## 2023-07-12 NOTE — PROGRESS NOTES
Referring Physician: Dr. Joya Adams  Diagnosis:Primary osteoarthritis of both knees (M17.0)  Insurance: [employer] PPO  precautions  :depression, HTN, left knee locking, migraine hx    Date of Onset: 2016,   Initial LEFS: 37.5  Final LEFS: TBD Date of Eval: 2/27/2023   \"GILMA GORDON\":   2 years since pain 3/10 or less. Subjective: patient reports pain was more sore yesterday and since last session, had one episode of locking yesterday and one occurred during session today after clam                Date 4/10/23 4/19/23 4/24/23 5/15/23 5/22/23 6/28/23 7/10/23 7/12/23                   Pain Range Right :less stfff Right and left knees better.  Right and left knees  Right and left knee 2-8/10 Right knee and left knee   Right 4.5-5.5/10  Left: 6-7/10    Right knee 0-5/10  Left knee 3-6.5/10 Right knee  2-4.5/10    Left 2-6/10       Pain  R: 4/10  L: 5/10 R: 4.5/10  L: 4.5/10 R: 4.0/10  L: 4.5/10 R: 6/10  L: 7.5/10 post rx 6/10 R: knee : 4.5/10  L: knee 6/10 Right knee 3/10  Left 4.5/10 Right  and let knee current 2/10 and same  Right knee 4/10 and left knee 4-5/10 had one locking yesterday and one during session                    Objective:    AROM:  initial initial initial 3/6/23 3/20/23 3/29/23 4/10/23 4/24/23 5/15/23 6/28/23    Hip Knee Foot/Ankle Knee R/L Knee R/L Knee R/L Knee R/L Knee R/L Knee R/L Knee R/L  Knee R/L   Flexion: R 95; L 95  Extension: R 0; L 0  Abduction: R WFL; L WFL  ER: R 42*; L 43*    Flexion: R 105*; L 95*103 post rx  Extension: R 0; L -3    DF: R 12; L 13  PF: R WFL; L WFL  INV: R WNL; L WNL  EV: R WNL; L WNL    Flex: 100/101  (post 105 sitting and on  Right//101 deg      Flex: seated 104 deg/105 deg  Flex: 102 seated/106    Flex: 113/Seated: 106 Flex 109/107   Flex: 107/104 Flex: 111-112//105 Flex:       7/10/23: Spring testing for right and left Sideglides: L greater than R restricted  7/10/23 spring testing for right and left hip IR: left greater than right restriction     Accessory motion: decreased left greater than right patellar mobility  Initial : Flexibility: WNL, min, mod, severe  Hip Flexor: R Mod, L mod  Hamstrings: R mod; L mod  Piriformis: R mod; L mod  Quads: R sev; L sev  Gastroc: R mod; L mod     Strength/MMT:   Hip Knee Foot/Ankle   Flexion: R 4/5*; L 4/5*  Extension: R NT /5; L NT/5  Abduction: R 4/5; L 4/5  ER: R 4/5; L 4/5    Flexion: R 5-/5; L 5-/5  Extension: R 4+/5*; L 4-/5*    DF: R 5/5; L 5/5  PF: R NT/5; L NT/5  INV: R 5-/5; L 5-/5  EV: R 5-/5; L 5-/5         Special tests: Initial : TUG with SPC 13.54 sec      Assessment: patient states that LAQ tolerated well at home but possibly more sore with SHuttle exercises last session. Held today. Encouraged patient to avoid genu recurvatum in standing as precaution and watch alignment of lower chain. Will want to review this. Plan:  See patient for one more sessions of PT for bilateral knees to maximize ROM, strength, improved quad, TFL and hip flexor flexibility, pain reduction, and decreased occurrence of knee locking left. Review of Home ex. Position Exercise HEP 5/15/23  Visit  11/16 5/22/23  Visits 12/16 6/28/23  Visit 13/16 7/10/23  Visit 14/16 7/12/23  Visit #15/16   26 Left heel slides         Seated  MWM for tibial IR with seated knee flexion left   10x Supine      Supine  SPring testing for Sideglides     X completed    S/L on left  Use of two towels under left hip trial Possibe H    X 1-1:30 mins total held to remove one folded towel    Supine with small bolster Prolonged R and L hip IR fulcrum      X 2 mins to 3 mins each     supine Left /right knee AROM flexion measures  X see above X 10x X completed for above record     supine QS  15 x 5 secs bilat with adjustment for size of towel to be bigger.   H        supine SLR Bilat 2 x 10 H     X 1# 2 x 10   Seated  heel slides  5-15 reps bilat H 4-5 x each 4-5 x each X bilat X bilat    Seated  Trial bike upright   4 mins      Seated  Nu Step arms and legs seat/arm 9 # level 4   5 mins       X 7:30 mins  X 5 mins X 5 mins    H/L clams      BTB  3 x 10             seated Towel squeezes adductors          Supine  RF stretch right and left gentle 2 x 15 secs H        standing TFL stretch instruction left         Seated  LAQ    Left knee 1# and 0# 10x each Left knee 2. 5. # and 2.5# 30x each Left and right knees 2.5# 2-3x 10    Shuttle small arc PS unilat     X 25# 2 x10    H/L clams h     Blue 3 x 10 reps     Shuttle small arc partial squats bilat      X 4 x 10 30# painfree arcs bilat    standing In II bars with level 3 gastroc stretches          Supine on bolster PROM left knee and AROM to relieve pressure. Trial hip abd and Tibial adduction to assess response           seated and supine PROM left knee flex and extension with lateral unloading    5 x each 10x left supine and 5-10 x seated left 10x left supine and 5-10 x seated left      II bars for gait pattern to  Reduce guarding with short arc knee flexion and avoidance of full extension                         Manual Therapy:          Seated  Left and Right Tibial IR mobs @ 90 deg knee flexion grade 3   X x     Supine  Joint distraction of left Tibial femoral joint  with SAQ   X grade 2-3 X 2-3 reps     supine STM to left TFL and glut medius   X X X  X TFL only X TFL   supine STM to left Quad distal   X X X  X  X   Supine  Patellar left medial glides grade 2-3   X X X  X X   Supine STM to left VL   X X X      supine STM to right VL and TFL    X X X left only  X   Supine STM to bilateral RF   X X                supine US at 1.2w/cm2 to left Distal ITB and lateral retinacular areas and lateral left joint line knee, continuous mode           US at 1.2w/cm2 to right Distal ITB and lateral retinacular areas and lateral left joint line knee, continuous mode                    H = HEP. Pt given copies of all exercise for home program.  X = Exercises done this date - pt verbalized understanding and demonstrated competence.  All exercises done B unless otherwise indicated. 6/28/23; patient was given per email info regarding Bioskin patellar stabilization brace and siu home cervical traction  7/10/23 optional use of folded towel or pillow at left hip in left sidelying if tolerated clinic ex well              Charges:  Therap ex x 2(30 mins) man x 1( 15mins ) gait x 0 (0)            Total Timed Treatment: 45 min                      Total Treatment Time: 45 mins

## 2023-07-17 ENCOUNTER — OFFICE VISIT (OUTPATIENT)
Dept: PHYSICAL THERAPY | Facility: HOSPITAL | Age: 53
End: 2023-07-17
Attending: ORTHOPAEDIC SURGERY
Payer: COMMERCIAL

## 2023-07-17 PROCEDURE — 97110 THERAPEUTIC EXERCISES: CPT

## 2023-07-17 PROCEDURE — 97140 MANUAL THERAPY 1/> REGIONS: CPT

## 2023-07-17 NOTE — PROGRESS NOTES
Referring Physician: Dr. Randy Knutson  Diagnosis:Primary osteoarthritis of both knees (M17.0)  Insurance: [employer] PPO  precautions  :depression, HTN, left knee locking, migraine hx    Date of Onset: 2016,   Initial LEFS: 37.5  Final LEFS: TBD Date of Eval: 2/27/2023   \"GILMA GORDON\":   2 years since pain 3/10 or less. Subjective: patient reports pain was very sore upon awaking,  had one episode of during session in sidelying but less duration. Reports has been averaging one-two times per day    1-2/day getting locking when laying down. Date 4/10/23 4/19/23 4/24/23 5/15/23 5/22/23 6/28/23 7/10/23 7/12/23 7/17/23                  Pain Range Right :less stfff Right and left knees better.  Right and left knees  Right and left knee 2-8/10 Right knee and left knee   Right 4.5-5.5/10  Left: 6-7/10    Right knee 0-5/10  Left knee 3-6.5/10 Right knee  2-4.5/10    Left 2-6/10   RIght knee    Left knee when woke up bed 8/10 as,  4/10    Pain  R: 4/10  L: 5/10 R: 4.5/10  L: 4.5/10 R: 4.0/10  L: 4.5/10 R: 6/10  L: 7.5/10 post rx 6/10 R: knee : 4.5/10  L: knee 6/10 Right knee 3/10  Left 4.5/10 Right  and let knee current 2/10 and same  Right knee 4/10 and left knee 4-5/10 had one locking yesterday and one during session Right  3/10  Left for 5/10 when    Sitting 2/10 right  3/10 left                   Objective:    AROM:  initial initial initial 3/6/23 3/20/23 3/29/23 4/10/23 4/24/23 5/15/23 6/28/23 7/17/23   Hip Knee Foot/Ankle Knee R/L Knee R/L Knee R/L Knee R/L Knee R/L Knee R/L Knee R/L  Knee R/L   Flexion: R 95; L 95  Extension: R 0; L 0  Abduction: R WFL; L WFL  ER: R 42*; L 43*    Flexion: R 105*; L 95*103 post rx  Extension: R 0; L -3    DF: R 12; L 13  PF: R WFL; L WFL  INV: R WNL; L WNL  EV: R WNL; L WNL    Flex: 100/101  (post 105 sitting and on  Right//101 deg      Flex: seated 104 deg/105 deg  Flex: 102 seated/106    Flex: 113/Seated: 106 Flex 109/107   Flex: 107/104 Flex: 111-112//105 Flex:111/105  Ext: 0/0 deg         7/10/23: Spring testing for right and left Sideglides: L greater than R restricted  7/10/23 spring testing for right and left hip IR: left greater than right restriction  7/17/23: spring testing for left side glide more restricted     Accessory motion: decreased left greater than right patellar mobility  Initial : Flexibility: WNL, min, mod, severe  Hip Flexor: R Mod, L mod  Hamstrings: R mod; L mod  Piriformis: R mod; L mod  Quads: R sev; L sev  Gastroc: R mod; L mod     Strength/MMT:   Hip Knee Foot/Ankle   Flexion: R 4/5*; L 4/5*  Extension: R NT /5; L NT/5  Abduction: R 4/5; L 4/5  ER: R 4/5; L 4/5    Flexion: R 5-/5; L 5-/5  Extension: R 4+/5*; L 4-/5*    DF: R 5/5; L 5/5  PF: R NT/5; L NT/5  INV: R 5-/5; L 5-/5  EV: R 5-/5; L 5-/5           Strength/MMT:   Hip Knee Foot/Ankle   Flexion: R 4+/5*; L 4+/5  Extension: R NT /5; L NT/5  Abduction: R 4/5; L 4+/5  ER: R 4/5; L 4/5    Flexion: R 5-/5; L 5-/5  Extension: R 4+/5*hams; L 4/5* anterior knee and posterior knee    DF: R 5/5; L 5/5  PF: R NT/5; L NT/5  INV: R 5-/5; L 5-/5  EV: R 5-/5; L 5-/5        Special tests: Initial : TUG with SPC 13.54 sec   girth::R/L: 43.0cm/44.3 cm   Assessment: patient demonstrates some gains in strength and AROM. Still some locking at left knee, but patient with good tolerance to session today  Plan:  See patient for one more sessions of PT for bilateral knees to maximize ROM, strength, improved quad, TFL and hip flexor flexibility, pain reduction, and decreased occurrence of knee locking left. Review of Home ex.     Position Exercise HEP 5/15/23  Visit  11/16 5/22/23  Visits 12/16 6/28/23  Visit 13/16 7/10/23  Visit 14/16 7/12/23  Visit #15/16 7/17/23  Visit #16/16    Left heel slides       Left heel slides   Seated  MWM for tibial IR with seated knee flexion left   10x Supine       Supine  SPring testing for Sideglides     X completed  X completed   Seated  Hamstring stretch and heel slides H X Reviewed              S/L on left  Use of two towels under left hip trial Possibe H    X 1-1:30 mins total held to remove one folded towel  X reviewed trial for home   Supine with small bolster Prolonged R and L hip IR fulcrum      X 2 mins to 3 mins each      supine Left /right knee AROM flexion measures  X see above X 10x X completed for above record   X Completed   supine QS  15 x 5 secs bilat with adjustment for size of towel to be bigger. H      X reviewed   supine SLR Bilat 2 x 10 H     X 1# 2 x 10 X 1# 2 x 10   Seated  heel slides  5-15 reps bilat H 4-5 x each 4-5 x each X bilat X bilat  X bilat 5x   Seated  Trial bike upright   4 mins       Seated  Nu Step arms and legs seat/arm 9 # level 4   5 mins       X 7:30 mins  X 5 mins X 5 mins X 5 min   H/L H/L clams      BTB  3 x 10 X reviewed    Girth measures and MMT completed       X    seated Towel squeezes adductors        X 10 x 5 secs   Supine  RF stretch right and left gentle 2 x 15 secs H         standing TFL stretch instruction left          Seated  LAQ    Left knee 1# and 0# 10x each Left knee 2. 5. # and 2.5# 30x each Left and right knees 2.5# 2-3x 10 Left and right knees 2.5# 2-3x 10    Shuttle small arc PS unilat     X 25# 2 x10     H/L clams h     Blue 3 x 10 reps       Shuttle small arc partial squats bilat      X 4 x 10 30# painfree arcs bilat     standing In II bars with level 3 gastroc stretches           Supine on bolster PROM left knee and AROM to relieve pressure.  Trial hip abd and Tibial adduction to assess response            seated and supine PROM left knee flex and extension with lateral unloading    5 x each 10x left supine and 5-10 x seated left 10x left supine and 5-10 x seated left       II bars for gait pattern to  Reduce guarding with short arc knee flexion and avoidance of full extension                           Manual Therapy:           Seated  Left and Right Tibial IR mobs @ 90 deg knee flexion grade 3   X x      Supine Joint distraction of left Tibial femoral joint  with SAQ   X grade 2-3 X 2-3 reps      supine STM to left TFL and glut medius   X X X  X TFL only X TFL X   supine STM to left Quad distal   X X X  X  X X brief   Supine  Patellar left medial glides grade 2-3   X X X  X X    Supine STM to left VL   X X X    X    supine STM to right VL and TFL    X X X left only  X    Supine STM to bilateral RF   X X                  supine US at 1.2w/cm2 to left Distal ITB and lateral retinacular areas and lateral left joint line knee, continuous mode            US at 1.2w/cm2 to right Distal ITB and lateral retinacular areas and lateral left joint line knee, continuous mode                      H = HEP. Pt given copies of all exercise for home program.  X = Exercises done this date - pt verbalized understanding and demonstrated competence. All exercises done B unless otherwise indicated. 6/28/23; patient was given per email info regarding Bioskin patellar stabilization brace and siu home cervical traction  7/10/23 optional use of folded towel or pillow at left hip in left sidelying if tolerated clinic ex well     Access Code: [email or web address]  URL: [employer]/  Date: 07/17/2023  Prepared by: Oneta Ala    Exercises  - Seated Hamstring Stretch  - 1 x daily - 5 x weekly - 1 sets - 4 reps - 15 secs hold  - Supine Quad Set  - 1 x daily - 5 x weekly - 1 sets - 10 reps - 5 secs hold  - Seated Long Arc Quad  - 1 x daily - 5 x weekly - 2 sets - 10 reps  - Hooklying Clamshell with Resistance  - 1 x daily - 7 x weekly - 3 sets - 10 reps  - Small Range Straight Leg Raise  - 1 x daily - 5 x weekly - 2 sets - 10 reps  - Supine Hip Adduction Isometric with Ball  - 1 x daily - 5 x weekly - 2 sets - 10 reps - 3 s hold  - Seated Heel Slide  - 1 x daily - 7 x weekly - 3 sets - 10 reps         Charges:  Therap ex x 2(35 mins) man x 1( 10mins ) gait x 0 (0)            Total Timed Treatment: 45 min                      Total Treatment Time: 45 mins

## 2023-07-19 ENCOUNTER — TELEPHONE (OUTPATIENT)
Dept: PHYSICAL MEDICINE AND REHAB | Facility: CLINIC | Age: 53
End: 2023-07-19

## 2023-07-19 ENCOUNTER — OFFICE VISIT (OUTPATIENT)
Dept: PHYSICAL THERAPY | Facility: HOSPITAL | Age: 53
End: 2023-07-19
Attending: ORTHOPAEDIC SURGERY
Payer: COMMERCIAL

## 2023-07-19 ENCOUNTER — OFFICE VISIT (OUTPATIENT)
Dept: PHYSICAL MEDICINE AND REHAB | Facility: CLINIC | Age: 53
End: 2023-07-19
Payer: COMMERCIAL

## 2023-07-19 VITALS — HEART RATE: 94 BPM | DIASTOLIC BLOOD PRESSURE: 92 MMHG | SYSTOLIC BLOOD PRESSURE: 140 MMHG | OXYGEN SATURATION: 96 %

## 2023-07-19 DIAGNOSIS — M25.532 BILATERAL WRIST PAIN: ICD-10-CM

## 2023-07-19 DIAGNOSIS — G56.03 BILATERAL CARPAL TUNNEL SYNDROME: Primary | ICD-10-CM

## 2023-07-19 DIAGNOSIS — M25.531 BILATERAL WRIST PAIN: ICD-10-CM

## 2023-07-19 PROCEDURE — 99214 OFFICE O/P EST MOD 30 MIN: CPT | Performed by: PHYSICAL MEDICINE & REHABILITATION

## 2023-07-19 PROCEDURE — 3080F DIAST BP >= 90 MM HG: CPT | Performed by: PHYSICAL MEDICINE & REHABILITATION

## 2023-07-19 PROCEDURE — 97110 THERAPEUTIC EXERCISES: CPT

## 2023-07-19 PROCEDURE — 3077F SYST BP >= 140 MM HG: CPT | Performed by: PHYSICAL MEDICINE & REHABILITATION

## 2023-07-19 PROCEDURE — 97140 MANUAL THERAPY 1/> REGIONS: CPT

## 2023-07-19 NOTE — PROGRESS NOTES
Referring Physician: Dr. Manjula Hall  Diagnosis:Primary osteoarthritis of both knees (M17.0)  Insurance: [employer] PPO  precautions  :depression, HTN, left knee locking, migraine hx    Date of Onset: 2016,   Initial LEFS: 37.5  Final LEFS: TBD Date of Eval: 2/27/2023   \"GILMA NINE\":   2 years since pain 3/10 or less. SEND SIze of swiss ball,  31\"  cane and handle      Subjective: patient reports pain was still more sore upon awaking, but lower on left than last session in AM                     Date 4/10/23 4/19/23 4/24/23 5/15/23 5/22/23 6/28/23 7/10/23 7/12/23 7/17/23 7/19/23                 Pain Range Right :less stfff Right and left knees better. Right and left knees  Right and left knee 2-8/10 Right knee and left knee   Right 4.5-5.5/10  Left: 6-7/10    Right knee 0-5/10  Left knee 3-6.5/10 Right knee  2-4.5/10    Left 2-6/10   RIght knee    Left knee when woke up bed 8/10 as,  4/10 Right knee    6.5/10 left upon awaking   Pain  R: 4/10  L: 5/10 R: 4.5/10  L: 4.5/10 R: 4.0/10  L: 4.5/10 R: 6/10  L: 7.5/10 post rx 6/10 R: knee : 4.5/10  L: knee 6/10 Right knee 3/10  Left 4.5/10 Right  and let knee current 2/10 and same  Right knee 4/10 and left knee 4-5/10 had one locking yesterday and one during session Right  3/10  Left for 5/10 when    Sitting 2/10 right  3/10 left Right 3/10  Left for 5/10 when arriving to PT        Assessment: patient demonstrates some gains in strength and AROM BLE's. Still some locking at left knee, primarily, but has made gains from initial. Currently to attempt different ht of SPC use and look for canes with greater surface areas for handle . One suggestion sent to patient. Has partially achieved goals below and will likely benefit further over time with strengthening exercises and stretching exercises to reduce pain      Plan:   Also will DC knee therapy to HEP at present and emphasis placed on Cervical PT.   Will monitor Knees via my chart an on site clinic as patient is seen for additional PT for Neck. Patient to try [facility] on opposite side to decrease pressure of wrist and see if helps Carpel tunnel symptoms. Also to consider attempt to purchase cane with handle with greater surface area to see if this helps symptoms. Patient also had cane fitted lower and instructed patient in self measurement of cane ht for future. Pt to test cane with alternate hand use to see if this can held carpel tunnel symptoms. To also continue currently with HEP for knees as below. Thank you for your referral. If you have any questions, please contact me at Dept: [phone]. Sincerely,  Electronically signed by therapist: Kris Vitale, PT     Physician's certification required:  Yes  Please co-sign or sign and return this letter via fax as soon as possible to [phone]. I certify the need for these services furnished under this plan of treatment and while under my care.     X___________________________________________________ Date____________________        Objective:    AROM:  initial initial initial 3/6/23 3/20/23 3/29/23 4/10/23 4/24/23 5/15/23 6/28/23 7/17/23   Hip Knee Foot/Ankle Knee R/L Knee R/L Knee R/L Knee R/L Knee R/L Knee R/L Knee R/L  Knee R/L   Flexion: R 95; L 95  Extension: R 0; L 0  Abduction: R WFL; L WFL  ER: R 42*; L 43*    Flexion: R 105*; L 95*103 post rx  Extension: R 0; L -3    DF: R 12; L 13  PF: R WFL; L WFL  INV: R WNL; L WNL  EV: R WNL; L WNL    Flex: 100/101  (post 105 sitting and on  Right//101 deg      Flex: seated 104 deg/105 deg  Flex: 102 seated/106    Flex: 113/Seated: 106 Flex 109/107   Flex: 107/104 Flex: 111-112//105 Flex:111/105  Ext: 0/0 deg         7/10/23: Spring testing for right and left Sideglides: L greater than R restricted  7/10/23 spring testing for right and left hip IR: left greater than right restriction  7/17/23: spring testing for left side glide more restricted     Accessory motion: decreased left greater than right patellar mobility  Initial : Flexibility: WNL, min, mod, severe  Hip Flexor: R Mod, L mod  Hamstrings: R mod; L mod  Piriformis: R mod; L mod  Quads: R sev; L sev  Gastroc: R mod; L mod     Strength/MMT:   Hip Knee Foot/Ankle   Flexion: R 4/5*; L 4/5*  Extension: R NT /5; L NT/5  Abduction: R 4/5; L 4/5  ER: R 4/5; L 4/5    Flexion: R 5-/5; L 5-/5  Extension: R 4+/5*; L 4-/5*    DF: R 5/5; L 5/5  PF: R NT/5; L NT/5  INV: R 5-/5; L 5-/5  EV: R 5-/5; L 5-/5           Strength/MMT:   Hip Knee Foot/Ankle   Flexion: R 4+/5*; L 4+/5  Extension: R NT /5; L NT/5  Abduction: R 4/5; L 4+/5  ER: R 4/5; L 4/5    Flexion: R 5-/5; L 5-/5  Extension: R 4+/5*hams; L 4/5* anterior knee and posterior knee    DF: R 5/5; L 5/5  PF: R NT/5; L NT/5  INV: R 5-/5; L 5-/5  EV: R 5-/5; L 5-/5        Special tests: Initial : TUG with SPC  13.54 sec\\     girth::R/L: 48.5WF/94.4 cm         Certification From: 4/31/9841  To:10/21/2023     Pt will achieve involved knee bilateral  AROM extension -2  to -1 to achieve normal gait pattern. PARTIALLY MET  Pt will improve quad strength to 4+/5 to ascend 1 flight of stairs reciprocally without significant pain or limitation   PARTIALLY MET  Pt will be independent and compliant with comprehensive HEP to maintain progress achieved in PT  MET  Pt will be reaching LEFS score at DC to be 45 or better. TBD  Pt will be able to return to walking and ADL's without significant locking at left  knee and decrease TUG score to 12 secs or better.   Pt will see a reduction in max right knee pain to 3/10 to 2/10with most ADL's  PARTIALLY MET  Pt with independent HEP with good tolerance by 8-12    MET        Position Exercise HEP 5/15/23  Visit  11/16 5/22/23  Visits 12/16 6/28/23  Visit 13/16 7/10/23  Visit 14/16 7/12/23  Visit #15/16 7/17/23  Visit #16/16 7/19/23  #17/17    Left heel slides       Left heel slides Left heel slides   Seated  MWM for tibial IR with seated knee flexion left   10x Supine        Supine  SPring testing for Sideglides X completed  X completed X    Seated  Hamstring stretch and heel slides H      X Reviewed X    HEP reviewed       X  X   S/L on left  Use of two towels under left hip trial Possibe H    X 1-1:30 mins total held to remove one folded towel  X reviewed trial for home    Supine with small bolster Prolonged R and L hip IR fulcrum      X 2 mins to 3 mins each       supine Left /right knee AROM flexion measures  X see above X 10x X completed for above record   X Completed    supine QS  15 x 5 secs bilat with adjustment for size of towel to be bigger. H      X reviewed X   supine SLR Bilat 2 x 10 H     X 1# 2 x 10 X 1# 2 x 10 X 1.5# 2 x 10   Seated  heel slides  5-15 reps bilat H 4-5 x each 4-5 x each X bilat X bilat  X bilat 5x X left    Seated  Trial bike upright   4 mins        Seated  Nu Step arms and legs seat/arm 9 # level 4   5 mins       X 7:30 mins  X 5 mins X 5 mins X 5 min X 5-6/ mins   H/L H/L clams      BTB  3 x 10 X reviewed     Girth measures and MMT completed       X     seated Towel squeezes adductors        X 10 x 5 secs    Supine  RF stretch right and left gentle 2 x 15 secs H          standing TFL stretch instruction left           Seated  LAQ    Left knee 1# and 0# 10x each Left knee 2. 5. # and 2.5# 30x each Left and right knees 2.5# 2-3x 10 Left and right knees 2.5# 2-3x 10     Shuttle small arc PS unilat     X 25# 2 x10      H/L clams h     Blue 3 x 10 reps        Shuttle small arc partial squats bilat      X 4 x 10 30# painfree arcs bilat      standing In II bars with level 3 gastroc stretches            Supine on bolster PROM left knee and AROM to relieve pressure.  Trial hip abd and Tibial adduction to assess response             seated and supine PROM left knee flex and extension with lateral unloading    5 x each 10x left supine and 5-10 x seated left 10x left supine and 5-10 x seated left   X Left knee flex and extension     II bars for gait pattern to  Reduce guarding with short arc knee flexion and avoidance of full extension               GAIT:         Discussed type canes and suggestions for fitting ht. Manual Therapy:            Seated  Left and Right Tibial IR mobs @ 90 deg knee flexion grade 3   X x       Supine  Joint distraction of left Tibial femoral joint  with SAQ   X grade 2-3 X 2-3 reps       supine STM to left TFL and glut medius   X X X  X TFL only X TFL X X   supine STM to left Quad distal   X X X  X  X X brief X   Supine  Patellar left medial glides grade 2-3   X X X  X X     Supine STM to left VL   X X X    X  X   supine STM to right VL and TFL    X X X left only  X  X   Supine STM to bilateral RF   X X     X               supine US at 1.2w/cm2 to left Distal ITB and lateral retinacular areas and lateral left joint line knee, continuous mode             US at 1.2w/cm2 to right Distal ITB and lateral retinacular areas and lateral left joint line knee, continuous mode                        H = HEP. Pt given copies of all exercise for home program.  X = Exercises done this date - pt verbalized understanding and demonstrated competence. All exercises done B unless otherwise indicated. 6/28/23; patient was given per email info regarding Bioskin patellar stabilization brace and siu home cervical traction  7/10/23 optional use of folded towel or pillow at left hip in left sidelying if tolerated clinic ex well     Access Code: [email or web address]  URL: [email or web address]  Date: 07/17/2023  Prepared by: Jovanni Ibarra    Exercises  - Seated Hamstring Stretch  - 1 x daily - 5 x weekly - 1 sets - 4 reps - 15 secs hold  - Supine Quad Set  - 1 x daily - 5 x weekly - 1 sets - 10 reps - 5 secs hold  - Seated Long Arc Quad  - 1 x daily - 5 x weekly - 2 sets - 10 reps  - Hooklying Clamshell with Resistance  - 1 x daily - 7 x weekly - 3 sets - 10 reps  - Small Range Straight Leg Raise  - 1 x daily - 5 x weekly - 2 sets - 10 reps  - Supine Hip Adduction Isometric with Ball  - 1 x daily - 5 x weekly - 2 sets - 10 reps - 3 s hold  - Seated Heel Slide  - 1 x daily - 7 x weekly - 3 sets - 10 reps     7/19/23: Supine bilat ball pulls  7/19/23:         Charges:  Therap ex x 2(30 mins) man x 1( 10mins ) gait x 0 (5 min)            Total Timed Treatment: 45 min                      Total Treatment Time: 45 mins

## 2023-07-19 NOTE — PROGRESS NOTES
[address]  Progress Note    CHIEF COMPLAINT:  Patient presents with:  New Patient: Patient has migraines and white matter in brain. They/them has carpal tunnel syndrome and did the emg . Bilateral hand pain. Numbness on bilateral digits. Pain can be a 4/10. No pain meds. She has done Pt for neck. MRI cervical spine and brain. History of Present Illness:  Oleg Ram is a 50year old adult who presents today for follow up for symptoms of bilateral carpal tunnel. I saw the patient previously for an EMG on May 9 which showed bilateral demyelinating carpal tunnel. The patient has been using wrist splints at night which helped to a degree, also a neoprene glove which is used at work. The patient is receiving therapy for the neck and knees as well. There is numbness over the right middle finger and soreness over the wrists. PAST MEDICAL HISTORY:  Past Medical History:   Diagnosis Date    Anxiety state     Calculus of kidney     Carpal tunnel syndrome 2023    Depression     Essential hypertension     High cholesterol     Hx of motion sickness     riding in cars    Hyperalgia 2020    Knee pain, bilateral     Migraine headache     Migraines     Osteoarthritis     Visual impairment     Readers       SURGICAL HISTORY:  Past Surgical History:   Procedure Laterality Date    ANESTHESIA FOR;  PROCEDURES      EAB in 's     HYSTERECTOMY  2021    PROCEDURE:  Supracervical total abdominal hysterectomy and bilateral salpingo-oophorectomy. INCISIONAL HERNIA REPAIR N/A 2022    Lap to open    OOPHORECTOMY  2021    PROCEDURE:  Supracervical total abdominal hysterectomy and bilateral salpingo-oophorectomy.     OTHER SURGICAL HISTORY  1976    surgery done as a child for bladder issue     TOTAL ABDOMINAL HYSTERECTOMY N/A 3/9/2021    Procedure: supracervical total abdominal hysterectomy, bilateral salpingo, oophorectomy;  Surgeon: Alfonso Moreau MD;  Location: [facility] MAIN OR       SOCIAL HISTORY:   Social History    Occupational History      Not on file    Tobacco Use      Smoking status: Former        Packs/day: 0.50        Years: 15.00        Pack years: 7.5        Types: Cigarettes      Smokeless tobacco: Never      Tobacco comments: quit 15 years ago    Vaping Use      Vaping Use: Never used    Substance and Sexual Activity      Alcohol use: Not Currently      Drug use: Not Currently        Types: Other-see comments        Comment: Mushrooms      Sexual activity: Not Currently      CURRENT MEDICATIONS:   Current Outpatient Medications   Medication Sig Dispense Refill    venlafaxine  MG Oral Capsule SR 24 Hr Take 1 capsule (150 mg total) by mouth daily. 90 capsule 0    traZODone 50 MG Oral Tab Take 0.75 tablets (37.5 mg total) by mouth nightly as needed for Sleep. 30 tablet 0    Rimegepant Sulfate (NURTEC) 75 MG Oral Tablet Dispersible Take 75 mg by mouth as needed. Take one tablet at onset of migraine. Maximum dose in 24 hours is 1 tablet (75mg). 8 tablet 11    lisinopril 10 MG Oral Tab Take 1 tablet (10 mg total) by mouth nightly. 90 tablet 3    ATORVASTATIN 20 MG Oral Tab TAKE 1 TABLET(20 MG) BY MOUTH EVERY NIGHT 90 tablet 3    Mirabegron ER (MYRBETRIQ) 50 MG Oral Tablet 24 Hr Take 1 tablet (50 mg total) by mouth daily. 30 tablet 2    traZODone 50 MG Oral Tab Take 0.5-1 tablets (25-50 mg total) by mouth nightly as needed for Sleep. 30 tablet 1    Multiple Vitamins-Iron (MULTI-DAY PLUS IRON) Oral Tab Take by mouth. Meloxicam 15 MG Oral Tab Take 1 tablet (15 mg total) by mouth daily. 30 tablet 0    Melatonin 10 MG Oral Cap Take by mouth.      loratadine 10 MG Oral Tab Take 1 tablet (10 mg total) by mouth daily. ondansetron 4 MG Oral Tablet Dispersible Take 1 tablet (4 mg total) by mouth every 8 (eight) hours as needed for Nausea.  (Patient not taking: Reported on 7/19/2023) 15 tablet 0    Galcanezumab-gnlm (EMGALITY) 120 MG/ML Subcutaneous Solution Auto-injector Inject 120 mg into the skin every 30 (thirty) days. (Patient not taking: Reported on 7/19/2023) 1 each 0       ALLERGIES:     Marijuana (Cannabis*    PAIN  Penicillin G            NAUSEA AND VOMITING        PHYSICAL EXAM:   BP (!) 140/92   Pulse 94   LMP 03/02/2021   SpO2 96%     There is no height or weight on file to calculate BMI. General: No immediate distress  Extremities: No upper extremity edema bilaterally   Wrists: Full range of motion of the wrists and fingers with some discomfort on the right, some CMC and thenar eminence discomfort on the left  Neuro:   Cognition: alert & oriented x 3, attentive, comprehention intact, spontaneous speech intact  Strength:  Upper extremities have 5/5 strength  Sensation: Normal upper extremities  Reflexes: Normal upper extremities  Spurling's sign: neg        Data  EMG 5/9/2023:  Impression:  1. Abnormal study. 2.  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 This is characterized primarily by demyelination with slight sensory axon loss on the right. No acute motor nerve denervation. 3.  No electrodiagnostic evidence of peripheral polyneuropathy. Radiology Imaging:  I personally reviewed a cervical MRI from January 2023 showing disc bulges at C4-5, C5-6 and C6-7, mild degenerative changes, no cord compression or significant foraminal narrowing. ASSESSMENT AND PLAN:  1. Bilateral carpal tunnel syndrome  We discussed options. The patient will continue night splinting and start OT. Return in 6 weeks, consider carpal tunnel injection if no better  - OCCUPATIONAL THERAPY-EXTERNAL    2. Bilateral wrist pain  We will address joint stiffness in OT  - OCCUPATIONAL THERAPY-EXTERNAL        RTC 6 weeks        The patient was in agreement with the assessment and plan. All questions were answered.         Jatinder Solis MD  Physical Medicine and Rehabilitation/Sports [facility]

## 2023-07-23 NOTE — TELEPHONE ENCOUNTER
Please review. Protocol Failed or has No Protocol. Requested Prescriptions   Pending Prescriptions Disp Refills    traZODone 50 MG Oral Tab 30 tablet 0     Sig: Take 0.75 tablets (37.5 mg total) by mouth nightly as needed for Sleep.        There is no refill protocol information for this order          Future Appointments         Provider Department Appt Notes    Tomorrow MD Xiomara Roca, [facility] Followup for H. pylori    In 1 week Albertina Grimaldo MD [facility] for Pelvic [address] [facility] Urogynecology 2- month f/u Tele  [phone], per Dr. Carlyle Meeks    In 1 week Moni Anton, [address] knee) [employer] PPO  c/p $25, no auth, no limit    In 1 week Moni Anton, [address] (neck) [employer] PPO  c/p $25, no auth, no limit    In 2 weeks Moni Anton, [address] (neck) [employer] PPO  c/p $25, no auth, no limit    In 2 weeks Aminata FRANCO DO [address], [facility] [address], [place] Follow-up on Sleep Study    In 1 month Milton Way MD [address], [address], [place] 6 WEEK F/U    In 2 months MA, [address] [address], [place] CLN w/ MAC @ [address] [place] [address]            Recent Outpatient Visits              4 days ago Bilateral carpal tunnel syndrome    Macrina Dobson, Drew Lamar MD    Office Visit    4 days ago     [address]    Office Visit    6 days ago     [address]    Office Visit    1 week ago     [address] [place], [address] [place] [address] Hilary Visit    1 week ago     [address]    Office Visit

## 2023-07-24 RX ORDER — TRAZODONE HYDROCHLORIDE 50 MG/1
37.5 TABLET ORAL NIGHTLY PRN
Qty: 30 TABLET | Refills: 0 | Status: SHIPPED | OUTPATIENT
Start: 2023-07-24

## 2023-08-01 ENCOUNTER — VIRTUAL PHONE E/M (OUTPATIENT)
Dept: UROLOGY | Facility: HOSPITAL | Age: 53
End: 2023-08-01
Attending: OBSTETRICS & GYNECOLOGY
Payer: COMMERCIAL

## 2023-08-01 ENCOUNTER — OFFICE VISIT (OUTPATIENT)
Dept: PHYSICAL THERAPY | Facility: HOSPITAL | Age: 53
End: 2023-08-01
Attending: Other
Payer: COMMERCIAL

## 2023-08-01 VITALS — BODY MASS INDEX: 44.83 KG/M2 | HEIGHT: 63 IN | WEIGHT: 253 LBS

## 2023-08-01 DIAGNOSIS — R35.0 FREQUENCY OF MICTURITION: ICD-10-CM

## 2023-08-01 DIAGNOSIS — N39.41 URGE INCONTINENCE: Primary | ICD-10-CM

## 2023-08-01 DIAGNOSIS — R35.1 NOCTURIA: ICD-10-CM

## 2023-08-01 PROCEDURE — 97140 MANUAL THERAPY 1/> REGIONS: CPT

## 2023-08-01 PROCEDURE — 97110 THERAPEUTIC EXERCISES: CPT

## 2023-08-01 NOTE — PROGRESS NOTES
ID: Jacquelin Duff  : 1970  Date: 2023     This is a virtual/phone visit being conducted with patient's consent. History collected via telephone. MD interview conduction via telephone  The patient has provided verbal understanding of the co-insurance liability for the telephonic/video provider services. Length of services provided: 10 minutes. Patient presents with: Incontinence: Medication follow up       HPI:  The patient is a 50year-old female, , who was last seen on 23. She presented with symptoms of UUI and nocturia x 2 years, worse since open supracervical hysterectomy and BSO in . PMHx: Anxiety, depression, HTN, migraines, severe OA causing limited mobility. The patient uses a cane. She has morbid obesity with BMI of 45. Exam: No prolapse, normal PVR, negative culture. Started on Myrbetriq 50 mg. Interval history:  She has been taking Myrbetriq 50 mg with resolution of all incontinence symptoms. No medication side effects. No problems with coverage (using industry coupon). Urogynecology Summary:  Urogynecology Summary  Prolapse: No  ZULMA: No  Urge Incontinence: No  Nocturia Frequency:  (1-2)  Frequency: 2 - 3 hours  Incomplete emptying: No  Constipation: Yes (started new medication so having diarreha and constipation)  Activities are limited by UI/POP?: No  Currently Sexually Active: No      Review of Systems:    A comprehensive 12 point review of systems was completed. Pertinent positives noted in the the HPI. Denies CP  Denies SOB    Vitals:  Unable to perform vitals or do physical as this is a virtual visit. The patient sounds alert and oriented x 3, no acute distress. The patient is speaking in complete sentences without any conversational dyspnea or respiratory distress.       Impression:  (N39.41) Urge incontinence  (primary encounter diagnosis)    (R35.1) Nocturia    (R35.0) Frequency of micturition        Plan:  Continue Myrbetriq 50 mg PO daily.  Follow up in 6 months via audio visit or sooner prn.      Sammy Jimenez MD, [facility]  Female Pelvic Medicine and  Reconstructive Surgery (Urogynecology)  [phone] (Pager)

## 2023-08-02 ENCOUNTER — MED REC SCAN ONLY (OUTPATIENT)
Dept: PHYSICAL MEDICINE AND REHAB | Facility: CLINIC | Age: 53
End: 2023-08-02

## 2023-08-02 NOTE — PROGRESS NOTES
Referring Physician: Dr. Randy Gray  Diagnosis:Primary osteoarthritis of both knees (M17.0)  Insurance: [employer] P[employer]  precautions  :depression, HTN, left knee locking, migraine hx    Date of Onset: 2016,   Initial LEFS: 37.5  Final LEFS: TBD Date of Eval: 2/27/2023   \"GILMA NINE\":   2 years since pain 3/10 or less. SEND SIze of swiss ball,  31\"  cane and handle      Subjective: patient reports pain was still more sore upon awaking, but lower on left than last session in AM.  Patient reports that she has joined [employer] and has been performing aquatic exercise program and feeling good in water though had one episode of locking in water. Patient also notes seeing ortho surgeon who did not suggest changing current course of treatment and continuing HEP and no new injections at this time with f/u with Surgeon in December, 2023                     Date 4/10/23 4/19/23 4/24/23 5/15/23 5/22/23 6/28/23 7/10/23 7/12/23 7/17/23 7/19/23 8/1/23                  Pain Range Right :less stfff Right and left knees better. Right and left knees  Right and left knee 2-8/10 Right knee and left knee   Right 4.5-5.5/10  Left: 6-7/10    Right knee 0-5/10  Left knee 3-6.5/10 Right knee  2-4.5/10    Left 2-6/10   RIght knee    Left knee when woke up bed 8/10 as,  4/10 Right knee    6.5/10 left upon awaking Left knee 5/10  Right 4/10   Pain  R: 4/10  L: 5/10 R: 4.5/10  L: 4.5/10 R: 4.0/10  L: 4.5/10 R: 6/10  L: 7.5/10 post rx 6/10 R: knee : 4.5/10  L: knee 6/10 Right knee 3/10  Left 4.5/10 Right  and let knee current 2/10 and same  Right knee 4/10 and left knee 4-5/10 had one locking yesterday and one during session Right  3/10  Left for 5/10 when    Sitting 2/10 right  3/10 left Right 3/10  Left for 5/10 when arriving to PT         Assessment: patient demonstrates some gains in strength and AROM BLE's.    Still some locking at left knee, primarily, but has made gains from initial. Currently to attempt different ht of SPC use and look for canes with greater surface areas for handle . One suggestion sent to patient. Has partially achieved goals below and will likely benefit further over time with strengthening exercises and stretching exercises to reduce pain. Reports recent introduction of pool exercises at [employer] has been tolerating well. Will provide written aquatic exercise handout at time of next session for cervical PT rx. Patient does report some improvement in carpel tunnel symptoms with alternating cane       Plan:   Also will DC knee therapy to HEP at this time and  resume emphasis placed on Cervical PT. Will monitor Knees via my chart an on site clinic as patient is seen for additional PT for Neck. Patient to try to use SPC on alternate sides to decrease pressure of wrist and see if helps Carpel tunnel symptoms. Also to consider attempt to purchase cane with handle with greater surface area to see if this helps symptoms. . To also continue currently with HEP for knees as below. Thank you for your referral. If you have any questions, please contact me at Dept: [phone]. Sincerely,  Electronically signed by therapist: Alexi Madison, PT     Physician's certification required:  Yes  Please co-sign or sign and return this letter via fax as soon as possible to [phone]. I certify the need for these services furnished under this plan of treatment and while under my care.     X___________________________________________________ Date____________________        Objective:    AROM:  initial initial initial 3/6/23 3/20/23 3/29/23 4/10/23 4/24/23 5/15/23 6/28/23 7/17/23   Hip Knee Foot/Ankle Knee R/L Knee R/L Knee R/L Knee R/L Knee R/L Knee R/L Knee R/L  Knee R/L   Flexion: R 95; L 95  Extension: R 0; L 0  Abduction: R WFL; L WFL  ER: R 42*; L 43*    Flexion: R 105*; L 95*103 post rx  Extension: R 0; L -3    DF: R 12; L 13  PF: R WFL; L WFL  INV: R WNL; L WNL  EV: R WNL; L WNL    Flex: 100/101  (post 105 sitting and on  Right//101 deg      Flex: seated 104 deg/105 deg  Flex: 102 seated/106    Flex: 113/Seated: 106 Flex 109/107   Flex: 107/104 Flex: 111-112//105 Flex:111/105  Ext: 0/0 deg         7/10/23: Spring testing for right and left Sideglides: L greater than R restricted  7/10/23 spring testing for right and left hip IR: left greater than right restriction  7/17/23: spring testing for left side glide more restricted     Accessory motion: decreased left greater than right patellar mobility  Initial : Flexibility: WNL, min, mod, severe  Hip Flexor: R Mod, L mod  Hamstrings: R mod; L mod  Piriformis: R mod; L mod  Quads: R sev; L sev  Gastroc: R mod; L mod     Strength/MMT:   Hip Knee Foot/Ankle   Flexion: R 4/5*; L 4/5*  Extension: R NT /5; L NT/5  Abduction: R 4/5; L 4/5  ER: R 4/5; L 4/5    Flexion: R 5-/5; L 5-/5  Extension: R 4+/5*; L 4-/5*    DF: R 5/5; L 5/5  PF: R NT/5; L NT/5  INV: R 5-/5; L 5-/5  EV: R 5-/5; L 5-/5           Strength/MMT:   Hip Knee Foot/Ankle   Flexion: R 4+/5*; L 4+/5  Extension: R NT /5; L NT/5  Abduction: R 4/5; L 4+/5  ER: R 4/5; L 4/5    Flexion: R 5-/5; L 5-/5  Extension: R 4+/5*hams; L 4/5* anterior knee and posterior knee    DF: R 5/5; L 5/5  PF: R NT/5; L NT/5  INV: R 5-/5; L 5-/5  EV: R 5-/5; L 5-/5        Special tests: Initial : TUG with SPC  13.54 sec\\     girth::R/L: 01.6VM/45.8 cm         Certification From: 1/49/0169  To:10/21/2023     Pt will achieve involved knee bilateral  AROM extension -2  to -1 to achieve normal gait pattern. PARTIALLY MET  Pt will improve quad strength to 4+/5 to ascend 1 flight of stairs reciprocally without significant pain or limitation   PARTIALLY MET  Pt will be independent and compliant with comprehensive HEP to maintain progress achieved in PT  MET  Pt will be reaching LEFS score at DC to be 45 or better. TBD  Pt will be able to return to walking and ADL's without significant locking at left  knee and decrease TUG score to 12 secs or better.   Pt will see a reduction in max right knee pain to 3/10 to 2/10with most ADL's  PARTIALLY MET  Pt with independent HEP with good tolerance by 8-12    MET        Position Exercise HEP 6/28/23  Visit 13/16 7/10/23  Visit 14/16 7/12/23  Visit #15/16 7/17/23  Visit #16/16 7/19/23  #17/18 8/3/23  #18/18   prone Left quad/RF stretch       3 x 15 secs    Left heel slides     Left heel slides Left heel slides    Seated  MWM for tibial IR with seated knee flexion left  Supine         Supine  SPring testing for Sideglides   X completed  X completed X     Seated  Hamstring stretch and heel slides H    X Reviewed X     HEP reviewed     X  X    S/L on left  Use of two towels under left hip trial Possibe H  X 1-1:30 mins total held to remove one folded towel  X reviewed trial for home     Supine with small bolster Prolonged R and L hip IR fulcrum    X 2 mins to 3 mins each        supine Left /right knee AROM flexion measures  X completed for above record   X Completed     supine QS  15 x 5 secs bilat with adjustment for size of towel to be bigger. H    X reviewed X X   supine SLR Bilat 2 x 10 H   X 1# 2 x 10 X 1# 2 x 10 X 1.5# 2 x 10 X 1. # 2 x10   Seated  heel slides  5-15 reps bilat H X bilat X bilat  X bilat 5x X left  X left   Seated  Trial bike upright          Seated  Nu Step arms and legs seat/arm 9 # level 4   5 mins       X 5 mins X 5 mins X 5 min X 5-6/ mins X 5-6 mins L4   H/L H/L clams    BTB  3 x 10 X reviewed      Girth measures and MMT completed     X      seated Towel squeezes adductors      X 10 x 5 secs  X ball squeezes 15 x 5-10 secs   Supine  RF stretch right and left gentle 2 x 15 secs H      X prone RF stretches  left   standing TFL stretch instruction left          Seated  LAQ  Left knee 1# and 0# 10x each Left knee 2. 5. # and 2.5# 30x each Left and right knees 2.5# 2-3x 10 Left and right knees 2.5# 2-3x 10             X discussion and recommendations of aquatic exercises    Shuttle small arc PS unilat   X 25# 2 x10       H/L clams h   Blue 3 x 10 reps         Shuttle small arc partial squats bilat    X 4 x 10 30# painfree arcs bilat       standing In II bars with level 3 gastroc stretches           Supine on bolster PROM left knee and AROM to relieve pressure. Trial hip abd and Tibial adduction to assess response            seated and supine PROM left knee flex and extension with lateral unloading   10x left supine and 5-10 x seated left 10x left supine and 5-10 x seated left   X Left knee flex and extension X left knee flexion and ext     II bars for gait pattern to  Reduce guarding with short arc knee flexion and avoidance of full extension              GAIT:       Discussed type canes and suggestions for fitting ht. Manual Therapy:           Seated  Left and Right Tibial IR mobs @ 90 deg knee flexion grade 3  x        Supine  Joint distraction of left Tibial femoral joint  with SAQ  X 2-3 reps        supine STM to left TFL and glut medius   X  X TFL only X TFL X X X   supine STM to left Quad distal   X  X  X X brief X X   Supine  Patellar left medial glides grade 2-3   X  X X   X   Supine STM to left VL   X    X  X X   supine STM to right VL and TFL    X left only  X  X    Supine STM to bilateral RF       X               supine US at 1.2w/cm2 to left Distal ITB and lateral retinacular areas and lateral left joint line knee, continuous mode            US at 1.2w/cm2 to right Distal ITB and lateral retinacular areas and lateral left joint line knee, continuous mode                      H = HEP. Pt given copies of all exercise for home program.  X = Exercises done this date - pt verbalized understanding and demonstrated competence. All exercises done B unless otherwise indicated.     6/28/23; patient was given per email info regarding [place] patellar stabilization brace and siu home cervical traction  7/10/23 optional use of folded towel or pillow at left hip in left sidelying if tolerated clinic ex well Access Code: [email or web address]  URL: [email or web address]  Date: 07/17/2023  Prepared by: Rena Read    Exercises  - Seated Hamstring Stretch  - 1 x daily - 5 x weekly - 1 sets - 4 reps - 15 secs hold  - Supine Quad Set  - 1 x daily - 5 x weekly - 1 sets - 10 reps - 5 secs hold  - Seated Long Arc Quad  - 1 x daily - 5 x weekly - 2 sets - 10 reps  - Hooklying Clamshell with Resistance  - 1 x daily - 7 x weekly - 3 sets - 10 reps  - Small Range Straight Leg Raise  - 1 x daily - 5 x weekly - 2 sets - 10 reps  - Supine Hip Adduction Isometric with Ball  - 1 x daily - 5 x weekly - 2 sets - 10 reps - 3 s hold  - Seated Heel Slide  - 1 x daily - 7 x weekly - 3 sets - 10 reps     7/19/23: Supine bilat ball pulls  7/19/23:   8/1/23:Access Code: [email or web address]  URL: [email or web address]  Date: 08/03/2023  Prepared by: Rena Read    Exercises  - Supine Quad Set  - 1 x daily - 5 x weekly - 1 sets - 10 reps - 5 secs hold  - Hooklying Clamshell with Resistance  - 1 x daily - 7 x weekly - 3 sets - 10 reps  - Small Range Straight Leg Raise  - 1 x daily - 5 x weekly - 2 sets - 10 reps  - Supine Hip Adduction Isometric with Ball  - 1 x daily - 5 x weekly - 2 sets - 10 reps - 3 s hold  - Seated Heel Slide  - 1 x daily - 7 x weekly - 1 sets - 10 reps  - Supine Bridge  - 1 x daily - 7 x weekly - 3 sets - 10 reps  - Side Stepping  - 1 x daily - 2-3 x weekly - 1-2 lengths lengths  - Squat  - 1 x daily - 2-3 x weekly - 1-2 sets - 10 reps  - Forward Walking  - 1 x daily - 2-3 x weekly  - Backward Walking  - 1 x daily - 2-3 x weekly - 3 sets - 10 reps  - Standing Hip Circles at [employer]  - 1 x daily - 2-3 x weekly - 3 sets - 10 reps  - Standing March at [employer]  - 1 x daily - 2-3 x weekly - 3 sets - 10 reps  - Heel Toe Raises at Pool Wall  - 1 x daily - 2-3 x weekly - 3 sets - 10 reps  - Standing Hip Abduction Adduction at Pool Wall  - 1 x daily - 2-3 x weekly - 3 sets - 10 reps  - Flutter Kick on Back with Noodle  - 1 x daily - 2-3 x weekly - 2-4  lengths  - Flutter Kick on Back with Noodle  - 1 x daily - 2-3 x weekly       Charges:  Therap ex x 2(30 mins) man x 1( 10mins ) gait x 0 (5 min)            Total Timed Treatment: 45 min                      Total Treatment Time: 45 mins

## 2023-08-03 ENCOUNTER — OFFICE VISIT (OUTPATIENT)
Dept: PHYSICAL THERAPY | Facility: HOSPITAL | Age: 53
End: 2023-08-03
Attending: Other
Payer: COMMERCIAL

## 2023-08-03 PROCEDURE — 97140 MANUAL THERAPY 1/> REGIONS: CPT

## 2023-08-03 PROCEDURE — 97110 THERAPEUTIC EXERCISES: CPT

## 2023-08-03 NOTE — PROGRESS NOTES
Referring Physician: Jake Callahan DO     Date of Onset:  chronic  Date of Eval:    Diagnosis: Degeneration of cervical intervertebral disc (M50.30)  Cervicogenic headache (G44.86)  Insurance: [facility] PPO  Precautions:  Sensitive to massage  Neck Disability Index Score  Pre rx: Neck Disability Index Score  Score: 32 % (6/1/2023  9:32 AM)    Subjective: Patient reports still having headaches and neck and shoulder symptoms.  Pain levels as described below         Date 6/5/23 6/13/23 6/27/23 8/3/23          Pain Range        Pain current   5/10 HA, shoulder tightness  Decreased post rx 5/10  5/10  pre headache at 4/10 post  neck pre 3/10 ,  2/10 post 4.5/10 headache   Neck pre-rx 4.5/10  Right shoulder/upper trap stiffness, small on        Objective:   Observation/Posture:fwd head flexed CT junction, protraction scapular      initial initial 8/3/23 8/3/23   Cervical AROM: deg Pain (+/-) deg Pain (+/-)   Flexion 55 -                     57                    -   Extension 62 dizziness                    62                  Dizziness upon return from ext   R Sidebend 42 - stretch sl soreness back of neck 35   pn R upper , levator   L Sidebend 42 -stretch 42 Pn R distal acromion   R Rotation 62 Pn right cervical 3.5/10 60 Pn cervical and upper 5/10   L Rotation 58 No pain just stretch 58 Small cervical p/s 2/10/  mostly stretch   Protrusion WNL       Retraction WNL                Shoulder AROM: deg     initial Initial 8/3/23 8/3/23     R    L R L   Flex 170 sl pn 163        170 slight UT and inferior cuff pn 160 stretch   Abd 168 tight 170 pn   160 abd tight   170 pn   ER WNL WNL    WNL WNL   IR WNL WNL   WNL WNL   Strength UE:   -/5 BUE to be determined                 Flexibility: WNL, min, mod severe flexibility limitations   initial initial     R L   Upper trap minimally restricted moderately restricted   Levator scap minimally restricted moderately restricted   Scalenes minimally restricted moderately restricted Pec Major moderately restricted moderately restricted   Pec Minor moderately restricted moderately restricted   Lats minimally restricted minimally restricted      Palpation: elevated ms tone at both upper traps, both levator scaps, suboccipitals, cervical p/s  Upper Limb Tension Tests: TBD  Neuro Screen: intact sensation,         Goals: 8-12 sessions   Pt will be independent and compliant with comprehensive HEP to maintain progress achieved in PT to reduce ms tone, increase postural support ms for reduction of scap protraction  Patient will demonstrate a reduction in max Headache and neck area pain at cervical region from  max of  10/10 down to 4-5/10 (50%)  Pt will have improved thoracic PA mobility to WNL to improve cervical ROM as well as promote upright posturing and decreased pain with cervical AROM   Pt will have improved thoracic PA mobility to WNL to improve cervical ROM as well as promote upright posturing and decreased pain with cervical AROM   Patient with independent UE extremity, scapular stabilitzation ms and cervical strengthening ex to improved ms length and improve pectoral  flexibility  to decrease strain on cervical spine        Assessment: Patient with continued symptoms with today having aura and headache with sensitivity to fluorescent lighting. Measures of Cervical AROM without gains since last lession in June, and slight reduction in cervical motion. Emphasis on cervical treatment. Plan: Continue PT per original plan for therapeutic exercises, posture retraining, therapeutic activities, manual treatment, neuromuscular reeducation, therapeutic pain neuroscience education, patient education, modalities as needed.; Assess response and carry over to tone reduction.  Work on suboccipital area next session and trial Intermittent siu like cervical traction next session  Sent copy per email of aquatic exercises    DB=Diaphragmatic breathing, LTR=lower trunk rotation, SKTC=Single knee to chest, DKTC=double knee to chest, BFB=pressure biofeedback training, PPT= posterior pelvic tilt, TFL=Tensor fascia maria eugenia, RF=rectus femoris, H/L=hooklying  Position Therap. Exercise HEP 6/5/23  2/12 6/13/23  3/12 6/27/23  4/12 8/3/23  5/12     Reassessment, cervical and shoulder bilateral AROM, posture,      X    seated Lateral neck flexion stretch B 3 x 15 secs with DB H X X   X    seated Diaphragmatic breathing ex (DB) H,  X 10 x X 10x   X    Seated Cervical retractions 10x 3 secs H X 10x X 10x  X 5 x  5 secs    Seated  Scapular retractions  H X  5x X 5X  X 5x 3 secs    Seated  Shoulder rolls and DB combined  X 10x  X 10x       Seated  OH pulleys flex and abd   X 2-3 mins      standing Posture training with mirror feedback use of two mirrors for cervical retractions and scapular retractions   X 5 x each      Seated  Levator scap stretches H        Seated  Passive pect major and minor assisted stretches by therapist sustained    3 mins 3  minx     Manual rx         Supine OA release   2-3 mins 2-3 mins 3 min 3 mins    Supine  Light Sustained pressure with DB at both upper traps to tolerance and to both Levator scap  5 mins 5-6 min 5-6 mins 5-6 mins    supine Sustained pressure with DB to sub 0ccipitals  X  X  X     Supine  Light STM to right >left cervical p/s  X        supine More light to moderate STM to both cervical p/s   X  X     supine Intermittent manual cervical traction    10 x 10 secs                H = HEP. Pt given copies of all exercise for home program.  X = exercise completed as described previously  Access Code: Shanae Villafuerte: [email or web address]  Date: 06/01/2023  Prepared by: Pat Gonzalez     Exercises  - Seated Scapular Retraction  - 3 x daily - 7 x weekly - 1 sets - 5 reps - 2 s hold  - Seated Passive Cervical Retraction  - 3 x daily - 7 x weekly - 1 sets - 5 reps - 2 secs hold  - Seated Cervical Sidebending AROM  - 3 x daily - 7 x weekly - 1 sets - 5 reps - 15 s hold  - Seated Cervical Sidebending AROM (Mirrored)  - 3 x daily - 7 x weekly - 1 sets - 5 reps - 15 s hold    Charged Units Units Minutes   Neuro     Ther ex 2 25   Gait     Man Tx 1 20   US          Total Timed 3 45   Total Tx Time  45      Access Code: [email or web address]  URL: [email or web address]  Date: 08/04/2023  Prepared by: Domenic Jason    Exercises  - Side Stepping  - 1 x daily - 2-3 x weekly - 1-2 lengths lengths  - Forward Walking  - 1 x daily - 2-3 x weekly  - Backward Walking  - 1 x daily - 2-3 x weekly - 3 sets - 10 reps  - Standing Hip Circles at [employer]  - 1 x daily - 2-3 x weekly - 3 sets - 10 reps  - Standing March at [employer]  - 1 x daily - 2-3 x weekly - 3 sets - 10 reps  - Heel Toe Raises at Pool Wall  - 1 x daily - 2-3 x weekly - 3 sets - 10 reps  - Standing Hip Abduction Adduction at Pool Wall  - 1 x daily - 2-3 x weekly - 3 sets - 10 reps  - Flutter Kick on Back with Noodle  - 1 x daily - 2-3 x weekly - 2-4  lengths

## 2023-08-04 ENCOUNTER — TELEPHONE (OUTPATIENT)
Dept: UROLOGY | Facility: HOSPITAL | Age: 53
End: 2023-08-04

## 2023-08-04 NOTE — TELEPHONE ENCOUNTER
TC from pt stating her myrbetriq prescription is too expensive, even with insurance and and a coupon.

## 2023-08-07 NOTE — TELEPHONE ENCOUNTER
TC to pt to f/u on myrbetriq cost. Pt stated she thinks the pharmacy didn't apply the coupon and she already ran her credit card through and paid for the prescription. Pt states she will call next month when she refills if there are issues with using the coupon. Pt understands and agrees to plan.

## 2023-08-08 ENCOUNTER — TELEPHONE (OUTPATIENT)
Dept: UROLOGY | Facility: HOSPITAL | Age: 53
End: 2023-08-08

## 2023-08-08 ENCOUNTER — OFFICE VISIT (OUTPATIENT)
Dept: PHYSICAL THERAPY | Facility: HOSPITAL | Age: 53
End: 2023-08-08
Attending: FAMILY MEDICINE
Payer: COMMERCIAL

## 2023-08-08 PROCEDURE — 97110 THERAPEUTIC EXERCISES: CPT

## 2023-08-08 PROCEDURE — 97140 MANUAL THERAPY 1/> REGIONS: CPT

## 2023-08-08 NOTE — TELEPHONE ENCOUNTER
Outgoing telephone call to the patient. No answer. Left a detailed message on voice recorder for the patient. Message left for the patient is to return office telephone call at [phone]. Our office reached out to her pharmacy. Spoke to pharmacy staff member Darrion Abdullahi. Darrion Abdullahi stated that the patient picked up a     prescription for Myrbetriq on 05/27/2023 for free. In June of 2023 the patient paid a $ 20.00 co- pay. August 1st, 2023     the medication was 369.22. Darrion Abdullahi suggested that her insurance may have changed mid year and perhaps she didn't meet     her deductible yet. Encouraged the patient to call office at [phone].

## 2023-08-08 NOTE — PROGRESS NOTES
Referring Physician: Essence Tapia DO     Date of Onset:  chronic  Date of Eval:    Diagnosis: Degeneration of cervical intervertebral disc (M50.30)  Cervicogenic headache (G44.86)  Insurance: [facility] PPO  Precautions:  Sensitive to massage  Neck Disability Index Score  Pre rx: Neck Disability Index Score  Score: 32 % (6/1/2023  9:32 AM)    Subjective: Patient reports bad Migraine HA yesterday, today not as bad but neck and shoulder pain elevated. . Pain levels as described below.   Weather and stress and hormones linked to pain per patient          Date 6/5/23 6/13/23 6/27/23 8/3/23 8/8/23           Pain Range         Pain current   5/10 HA, shoulder tightness  Decreased post rx 5/10  5/10  pre headache at 4/10 post  neck pre 3/10 ,  2/10 post 4.5/10 headache   Neck pre-rx 4.5/10  Right shoulder/upper trap stiffness, small on  2.5/10 headache    Neck: and shoulder area 5/10,       Objective:   Observation/Posture:fwd head flexed CT junction, protraction scapular      initial initial 8/3/23 8/3/23   Cervical AROM: deg Pain (+/-) deg Pain (+/-)   Flexion 55 -                     57                    -   Extension 62 dizziness                    62                  Dizziness upon return from ext   R Sidebend 42 - stretch sl soreness back of neck 35   pn R upper , levator   L Sidebend 42 -stretch 42 Pn R distal acromion   R Rotation 62 Pn right cervical 3.5/10 60 Pn cervical and upper 5/10   L Rotation 58 No pain just stretch 58 Small cervical p/s 2/10/  mostly stretch   Protrusion WNL       Retraction WNL                Shoulder AROM: deg     initial Initial 8/3/23 8/3/23     R    L R L   Flex 170 sl pn 163        170 slight UT and inferior cuff pn 160 stretch   Abd 168 tight 170 pn   160 abd tight   170 pn   ER WNL WNL    WNL WNL   IR WNL WNL   WNL WNL   Strength UE:   -/5 BUE to be determined                 Flexibility: WNL, min, mod severe flexibility limitations   initial initial     R L   Upper trap minimally restricted moderately restricted   Levator scap minimally restricted moderately restricted   Scalenes minimally restricted moderately restricted   Pec Major moderately restricted moderately restricted   Pec Minor moderately restricted moderately restricted   Lats minimally restricted minimally restricted      Palpation: elevated ms tone at both upper traps, both levator scaps, suboccipitals, cervical p/s  Upper Limb Tension Tests: TBD  Neuro Screen: intact sensation,         Goals: 8-12 sessions   Pt will be independent and compliant with comprehensive HEP to maintain progress achieved in PT to reduce ms tone, increase postural support ms for reduction of scap protraction  Patient will demonstrate a reduction in max Headache and neck area pain at cervical region from  max of  10/10 down to 4-5/10 (50%)  Pt will have improved thoracic PA mobility to WNL to improve cervical ROM as well as promote upright posturing and decreased pain with cervical AROM   Pt will have improved thoracic PA mobility to WNL to improve cervical ROM as well as promote upright posturing and decreased pain with cervical AROM   Patient with independent UE extremity, scapular stabilitzation ms and cervical strengthening ex to improved ms length and improve pectoral  flexibility  to decrease strain on cervical spine        Assessment: Patient with continued symptoms with today less Migraine like today. Tolerated last session well. .. Emphasis on cervical treatment. Plan: Continue PT per original plan for therapeutic exercises, posture retraining, therapeutic activities, manual treatment, neuromuscular reeducation, therapeutic pain neuroscience education, patient education, modalities as needed.; Assess response and carry over to tone reduction.  Work on suboccipital area next session and trial Intermittent siu like cervical traction next session      DB=Diaphragmatic breathing, LTR=lower trunk rotation, SKTC=Single knee to chest, DKTC=double knee to chest, BFB=pressure biofeedback training, PPT= posterior pelvic tilt, TFL=Tensor fascia maria eugenia, RF=rectus femoris, H/L=hooklying  Position Therap. Exercise HEP 6/5/23  2/12 6/13/23  3/12 6/27/23  4/12 8/3/23  5/12 8/8/23  6/12    Reassessment, cervical and shoulder bilateral AROM, posture,      X    H/L Onto 1/2 foam roll along spine  Y' and T's stretches      X 1 x 30 secs each             seated Lateral neck flexion stretch B 3 x 15 secs with DB H X X   X    seated Diaphragmatic breathing ex (DB) H,  X 10 x X 10x   X    Seated Cervical retractions 10x 3 secs H X 10x X 10x  X 5 x  5 secs    Seated  Scapular retractions  H X  5x X 5X  X 5x 3 secs    Seated  Shoulder rolls and DB combined  X 10x  X 10x       Seated  OH pulleys flex and abd   X 2-3 mins      standing Posture training with mirror feedback use of two mirrors for cervical retractions and scapular retractions   X 5 x each      Seated  Levator scap stretches H        Seated  Passive pect major and minor assisted stretches by therapist sustained    3 mins 3  min 30 secs     Manual rx         Supine OA release   2-3 mins 2-3 mins 3 min 3 mins     Seated with head on plinth STM to both C-p/s, and both UT and LS, and sub-occipitals  moderate pressure      X    Supine  Light Sustained pressure with DB at both upper traps to tolerance and to both Levator scap  5 mins 5-6 min 5-6 mins 5-6 mins    supine Sustained pressure with DB to sub 0ccipitals  X  X  X     Supine  Light STM to right >left cervical p/s  X     X    supine More light to moderate STM to both cervical p/s   X  X  X   supine Intermittent manual cervical traction    10 x 10 secs   X seated manual    intermittent C-traction          Intermittent C-traction supine  # max pull,  15 secs on 15 secs off 10 x cycle      For next session                       H = HEP.  Pt given copies of all exercise for home program.  X = exercise completed as described previously  Access Code: [email or web address]  URL: [email or web address]  Date: 06/01/2023  Prepared by: Alessia Marie     Exercises  - Seated Scapular Retraction  - 3 x daily - 7 x weekly - 1 sets - 5 reps - 2 s hold  - Seated Passive Cervical Retraction  - 3 x daily - 7 x weekly - 1 sets - 5 reps - 2 secs hold  - Seated Cervical Sidebending AROM  - 3 x daily - 7 x weekly - 1 sets - 5 reps - 15 s hold  - Seated Cervical Sidebending AROM (Mirrored)  - 3 x daily - 7 x weekly - 1 sets - 5 reps - 15 s hold    Charged Units Units Minutes   Neuro     Ther ex 1  15   Gait     Man Tx 2 25   US          Total Timed 3 40   Total Tx Time  40      Access Code: [email or web address]  URL: [employer]/  Date: 08/04/2023  Prepared by: Alessia Marie    Exercises  - Side Stepping  - 1 x daily - 2-3 x weekly - 1-2 lengths lengths  - Forward Walking  - 1 x daily - 2-3 x weekly  - Backward Walking  - 1 x daily - 2-3 x weekly - 3 sets - 10 reps  - Standing Hip Circles at [employer]  - 1 x daily - 2-3 x weekly - 3 sets - 10 reps  - Standing March at [employer]  - 1 x daily - 2-3 x weekly - 3 sets - 10 reps  - Heel Toe Raises at [employer]  - 1 x daily - 2-3 x weekly - 3 sets - 10 reps  - Standing Hip Abduction Adduction at Pool [employer]  - 1 x daily - 2-3 x weekly - 3 sets - 10 reps  - Flutter Kick on Back with Noodle  - 1 x daily - 2-3 x weekly - 2-4  lengths

## 2023-08-09 ENCOUNTER — TELEPHONE (OUTPATIENT)
Dept: UROLOGY | Facility: HOSPITAL | Age: 53
End: 2023-08-09

## 2023-08-09 ENCOUNTER — OFFICE VISIT (OUTPATIENT)
Dept: FAMILY MEDICINE CLINIC | Facility: CLINIC | Age: 53
End: 2023-08-09

## 2023-08-09 VITALS
OXYGEN SATURATION: 98 % | DIASTOLIC BLOOD PRESSURE: 83 MMHG | SYSTOLIC BLOOD PRESSURE: 133 MMHG | WEIGHT: 253 LBS | HEART RATE: 72 BPM | RESPIRATION RATE: 16 BRPM | HEIGHT: 63 IN | BODY MASS INDEX: 44.83 KG/M2

## 2023-08-09 DIAGNOSIS — K59.00 CONSTIPATION, UNSPECIFIED CONSTIPATION TYPE: ICD-10-CM

## 2023-08-09 DIAGNOSIS — A04.8 H. PYLORI INFECTION: Primary | ICD-10-CM

## 2023-08-09 DIAGNOSIS — I10 ESSENTIAL HYPERTENSION: ICD-10-CM

## 2023-08-09 DIAGNOSIS — R19.5 LOOSE STOOLS: ICD-10-CM

## 2023-08-09 PROCEDURE — 3079F DIAST BP 80-89 MM HG: CPT | Performed by: FAMILY MEDICINE

## 2023-08-09 PROCEDURE — 3075F SYST BP GE 130 - 139MM HG: CPT | Performed by: FAMILY MEDICINE

## 2023-08-09 PROCEDURE — 3008F BODY MASS INDEX DOCD: CPT | Performed by: FAMILY MEDICINE

## 2023-08-09 PROCEDURE — 99214 OFFICE O/P EST MOD 30 MIN: CPT | Performed by: FAMILY MEDICINE

## 2023-08-09 NOTE — H&P
HPI:    Floyd Drew is a 50year old adult presents to clinic for follow-up. Recent H. pylori infection, completed triple therapy. Would like to retest.  Feels abdominal pain/discomfort have improved. Still has alternating constipation/diarrhea. Feels dairy worsens diarrhea. Overall, feeling better. Has been exercising more-stationary bike and swimming. HISTORY:  Past Medical History:   Diagnosis Date    Anxiety state     Calculus of kidney     Carpal tunnel syndrome 2023    Depression     Essential hypertension     High cholesterol     Hx of motion sickness     riding in cars    Hyperalgia 2020    Knee pain, bilateral     Migraine headache     Migraines     Osteoarthritis     Visual impairment     Readers      Past Surgical History:   Procedure Laterality Date    ANESTHESIA FOR;  PROCEDURES      EAB in 's     HYSTERECTOMY  2021    PROCEDURE:  Supracervical total abdominal hysterectomy and bilateral salpingo-oophorectomy. INCISIONAL HERNIA REPAIR N/A 2022    Lap to open    OOPHORECTOMY  2021    PROCEDURE:  Supracervical total abdominal hysterectomy and bilateral salpingo-oophorectomy.     OTHER SURGICAL HISTORY  1976    surgery done as a child for bladder issue     TOTAL ABDOMINAL HYSTERECTOMY N/A 3/9/2021    Procedure: supracervical total abdominal hysterectomy, bilateral salpingo, oophorectomy;  Surgeon: Sage Ramon MD;  Location: [facility] MAIN OR      Family History   Problem Relation Age of Onset    Heart Disorder Father     Heart Disorder Mother         Cardio Aortic aneurysm    Breast Cancer Sister 28      Social History:   Social History     Socioeconomic History    Marital status: Single   Tobacco Use    Smoking status: Former     Packs/day: 0.50     Years: 15.00     Pack years: 7.50     Types: Cigarettes    Smokeless tobacco: Never    Tobacco comments:     quit 15 years ago   Vaping Use    Vaping Use: Never used   Substance and Sexual Activity Alcohol use: Not Currently    Drug use: Not Currently     Types: Other-see comments     Comment: Mushrooms    Sexual activity: Not Currently   Other Topics Concern    Blood Transfusions No    Special Diet Yes     Comment: Dash Diet        Medications (Active prior to today's visit):  Current Outpatient Medications   Medication Sig Dispense Refill    ondansetron 4 MG Oral Tablet Dispersible Take 1 tablet (4 mg total) by mouth every 8 (eight) hours as needed for Nausea. 15 tablet 0    venlafaxine  MG Oral Capsule SR 24 Hr Take 1 capsule (150 mg total) by mouth daily. 90 capsule 0    Rimegepant Sulfate (NURTEC) 75 MG Oral Tablet Dispersible Take 75 mg by mouth as needed. Take one tablet at onset of migraine. Maximum dose in 24 hours is 1 tablet (75mg). 8 tablet 11    lisinopril 10 MG Oral Tab Take 1 tablet (10 mg total) by mouth nightly. 90 tablet 3    Galcanezumab-gnlm (EMGALITY) 120 MG/ML Subcutaneous Solution Auto-injector Inject 120 mg into the skin every 30 (thirty) days. 1 each 0    ATORVASTATIN 20 MG Oral Tab TAKE 1 TABLET(20 MG) BY MOUTH EVERY NIGHT 90 tablet 3    Mirabegron ER (MYRBETRIQ) 50 MG Oral Tablet 24 Hr Take 1 tablet (50 mg total) by mouth daily. 30 tablet 2    traZODone 50 MG Oral Tab Take 0.5-1 tablets (25-50 mg total) by mouth nightly as needed for Sleep. 30 tablet 1    Multiple Vitamins-Iron (MULTI-DAY PLUS IRON) Oral Tab Take by mouth. Meloxicam 15 MG Oral Tab Take 1 tablet (15 mg total) by mouth daily. 30 tablet 0    Melatonin 10 MG Oral Cap Take by mouth.      loratadine 10 MG Oral Tab Take 1 tablet (10 mg total) by mouth daily. Allergies:    Marijuana (Cannabis*    PAIN  Penicillin G            NAUSEA AND VOMITING           ROS:   Review of Systems   All other systems reviewed and are negative.       PHYSICAL EXAM:      08/09/23  1443   BP: 133/83   BP Location: Left arm   Patient Position: Sitting   Cuff Size: large   Pulse: 72   Resp: 16   SpO2: 98%   Weight: 253 lb (114.8 kg)   Height: 5' 3\" (1.6 m)     Physical Exam  Vitals reviewed. Constitutional:       General: Freda Norris is not in acute distress. Cardiovascular:      Rate and Rhythm: Normal rate. Pulmonary:      Effort: Pulmonary effort is normal. No respiratory distress. Neurological:      Mental Status: Freda Norris is alert. Psychiatric:         Mood and Affect: Mood normal.         ASSESSMENT/PLAN:   (A04.8) H. pylori infection  (primary encounter diagnosis)  Plan: HELICOBACTER PYLORI BREATH TEST  -H. pylori test repeated.      (R19.5) Loose stools  (K59.00) Constipation, unspecified constipation type  Plan: GASTRO - INTERNAL  -Will continue to avoid dairy, other triggers. Can benefit from GI evaluation, referral placed. Has endoscopy planned in September.    (I10) Essential hypertension  Plan:   -Blood pressure at goal, to continue current management. Continued lifestyle modifications encouraged. Follow-up in 6 months or sooner if needed. Responsible party/patient verbalized understanding of information discussed. No barriers to learning observed. Orders This Visit:  Orders Placed This Encounter      Helicobacter Pylori Breath Test, Adult      Meds This Visit:  Requested Prescriptions      No prescriptions requested or ordered in this encounter       Imaging & Referrals:  [facility] - INTERNAL       The 21st Century cures Act makes medical notes like these available to patients in the interest of transparency. However, be advised that this is a medical document. It is intended as peer to peer communication. It is written in medical language and may contain abbreviations or verbiage that are unfamiliar. It may appear blunt or direct. Medical documents are intended to carry relevant information, facts as evident, and the clinical opinion of the practitioner. This note was created by [employer] voice recognition.  Errors in content may be related to improper recognition by the system; efforts to review and correct have been done but errors may still exist. Please contact me with any questions.        8/9/2023  Valeria Quiles MD

## 2023-08-09 NOTE — TELEPHONE ENCOUNTER
Outgoing telephone call to the patient. Informed patient of the conversation which took place with her pharmacy regarding the cost of Myrbetriq. Provided the patient with the number to the [employer] program. [phone]. Suggested to contact her insurance company and ask if her yearly deductible has not been met. Requesting the patient update our office with the above findings. Patient verbalized understanding.

## 2023-08-10 ENCOUNTER — LAB ENCOUNTER (OUTPATIENT)
Dept: LAB | Facility: HOSPITAL | Age: 53
End: 2023-08-10
Attending: FAMILY MEDICINE
Payer: COMMERCIAL

## 2023-08-10 DIAGNOSIS — A04.8 H. PYLORI INFECTION: ICD-10-CM

## 2023-08-10 PROCEDURE — 83013 H PYLORI (C-13) BREATH: CPT

## 2023-08-11 LAB — H PYLORI BREATH TEST: NEGATIVE

## 2023-08-21 ENCOUNTER — TELEPHONE (OUTPATIENT)
Dept: UROLOGY | Facility: HOSPITAL | Age: 53
End: 2023-08-21

## 2023-08-21 NOTE — TELEPHONE ENCOUNTER
Outgoing telephone call to the patient following up on previous conversations regarding the cost of her prescribed Myrbetriq. Patient was going to contact her insurance company and ask if her yearly deductible had been met as well as inquire about the [employer] program.      Requesting the patient to call the office back with an update on the above findings. Office number provided, [phone].

## 2023-08-23 ENCOUNTER — TELEPHONE (OUTPATIENT)
Dept: PHYSICAL THERAPY | Facility: HOSPITAL | Age: 53
End: 2023-08-23

## 2023-08-25 DIAGNOSIS — G43.101 MIGRAINE WITH AURA AND WITH STATUS MIGRAINOSUS, NOT INTRACTABLE: Primary | ICD-10-CM

## 2023-08-25 DIAGNOSIS — N39.41 URGE INCONTINENCE: ICD-10-CM

## 2023-08-25 NOTE — TELEPHONE ENCOUNTER
Requested Prescriptions     Pending Prescriptions Disp Refills    Galcanezumab-gnlm (EMGALITY) 120 MG/ML Subcutaneous Solution Auto-injector [Pharmacy Med Name: EMGALITY 120MG/ML AUTO INJECTOR 1ML] 1 mL 5     Sig: Inject 120 mg as directed every 30 (thirty) days.       LOV: 6/16/23  NOV: none    Last refill/[place]: 6/27/23

## 2023-08-26 DIAGNOSIS — N39.41 URGE INCONTINENCE: ICD-10-CM

## 2023-08-28 RX ORDER — GALCANEZUMAB 120 MG/ML
INJECTION, SOLUTION SUBCUTANEOUS
Qty: 2 ML | Refills: 0 | OUTPATIENT
Start: 2023-08-28

## 2023-08-28 RX ORDER — GALCANEZUMAB 120 MG/ML
120 INJECTION, SOLUTION SUBCUTANEOUS
Qty: 1 ML | Refills: 5 | Status: SHIPPED | OUTPATIENT
Start: 2023-08-28

## 2023-08-28 RX ORDER — MIRABEGRON 50 MG/1
50 TABLET, FILM COATED, EXTENDED RELEASE ORAL DAILY
Qty: 30 TABLET | Refills: 11 | Status: SHIPPED | OUTPATIENT
Start: 2023-08-28

## 2023-08-28 RX ORDER — MIRABEGRON 50 MG/1
50 TABLET, FILM COATED, EXTENDED RELEASE ORAL DAILY
Qty: 30 TABLET | Refills: 2 | OUTPATIENT
Start: 2023-08-28

## 2023-08-29 ENCOUNTER — LAB ENCOUNTER (OUTPATIENT)
Dept: LAB | Facility: HOSPITAL | Age: 53
End: 2023-08-29
Attending: OBSTETRICS & GYNECOLOGY
Payer: COMMERCIAL

## 2023-08-29 DIAGNOSIS — N39.41 URGE INCONTINENCE: ICD-10-CM

## 2023-08-29 PROCEDURE — 87086 URINE CULTURE/COLONY COUNT: CPT

## 2023-08-30 ENCOUNTER — TELEPHONE (OUTPATIENT)
Dept: UROLOGY | Facility: HOSPITAL | Age: 53
End: 2023-08-30

## 2023-08-31 DIAGNOSIS — G43.109 CHRONIC MIGRAINE WITH AURA: ICD-10-CM

## 2023-09-01 RX ORDER — PROPRANOLOL HYDROCHLORIDE 20 MG/1
20 TABLET ORAL 2 TIMES DAILY
Qty: 180 TABLET | Refills: 0 | OUTPATIENT
Start: 2023-09-01

## 2023-09-05 ENCOUNTER — OFFICE VISIT (OUTPATIENT)
Dept: PHYSICAL MEDICINE AND REHAB | Facility: CLINIC | Age: 53
End: 2023-09-05
Payer: COMMERCIAL

## 2023-09-05 VITALS — WEIGHT: 240 LBS | HEIGHT: 63 IN | BODY MASS INDEX: 42.52 KG/M2

## 2023-09-05 DIAGNOSIS — G56.03 BILATERAL CARPAL TUNNEL SYNDROME: Primary | ICD-10-CM

## 2023-09-05 DIAGNOSIS — M18.12 ARTHRITIS OF CARPOMETACARPAL (CMC) JOINT OF LEFT THUMB: ICD-10-CM

## 2023-09-05 PROCEDURE — 99213 OFFICE O/P EST LOW 20 MIN: CPT | Performed by: PHYSICAL MEDICINE & REHABILITATION

## 2023-09-05 PROCEDURE — 3008F BODY MASS INDEX DOCD: CPT | Performed by: PHYSICAL MEDICINE & REHABILITATION

## 2023-09-07 ENCOUNTER — TELEPHONE (OUTPATIENT)
Dept: PULMONOLOGY | Facility: CLINIC | Age: 53
End: 2023-09-07

## 2023-09-08 NOTE — TELEPHONE ENCOUNTER
Reference MyChart encounter dated 9/5/23 with Dr. Sobia Chairez. Barbie Arriaza, DO to [facility] Clinical Staff  9/8/23 11:16 AM   I am checking this message now but I got a message saying yesterday that patient wants CPAP discontinued. Before processing order for discontinuing CPAP if she wants to see me in the office one day to discuss any other options for modifying CPAP set up I am okay with that. The problem is if it is discontinued and she wants it back after a short period of time it will be difficult for her to be set up with a new CPAP.

## 2023-09-11 NOTE — TELEPHONE ENCOUNTER
Spoke with patient, informed them of Dr. Iza Kendrick message/response to their [employer] message. Patient agreeable to discussing CPAP issues at an upcoming appointment with Dr. Lexie Valencia. Confirmed follow up appointment for 9/21/2023 at 12:00 PM to discuss CPAP concerns. Confirmed date, time, location, and parking. Patient verbalized understanding.

## 2023-09-18 ENCOUNTER — APPOINTMENT (OUTPATIENT)
Dept: PHYSICAL THERAPY | Facility: HOSPITAL | Age: 53
End: 2023-09-18
Attending: FAMILY MEDICINE
Payer: COMMERCIAL

## 2023-09-20 ENCOUNTER — OFFICE VISIT (OUTPATIENT)
Dept: PHYSICAL THERAPY | Facility: HOSPITAL | Age: 53
End: 2023-09-20
Attending: FAMILY MEDICINE
Payer: COMMERCIAL

## 2023-09-20 PROCEDURE — 97140 MANUAL THERAPY 1/> REGIONS: CPT

## 2023-09-20 PROCEDURE — 97110 THERAPEUTIC EXERCISES: CPT

## 2023-09-20 NOTE — PROGRESS NOTES
Referring Physician: Jhony Oropeza DO     Date of Onset:  chronic  Date of Eval:    Diagnosis: Degeneration of cervical intervertebral disc (M50.30)  Cervicogenic headache (G44.86)  Insurance: [facility] PPO  Precautions:  Sensitive to massage  Neck Disability Index Score  Pre rx: Neck Disability Index Score  Score: 32 % (6/1/2023  9:32 AM)    Subjective: Patient reports bad Migraine HA today not as bad but neck and shoulder pain elevated. . Pain reports multiple factors play into headaches and stress and weather often  linked to pain per patient           Date 6/5/23 6/13/23 6/27/23 8/3/23 8/8/23 9/20/23            Pain Range          Pain current   5/10 HA, shoulder tightness  Decreased post rx 5/10  5/10  pre headache at 4/10 post  neck pre 3/10 ,  2/10 post 4.5/10 headache   Neck pre-rx 4.5/10  Right shoulder/upper trap stiffness, small on  2.5/10 headache    Neck: and shoulder area 5/10, 7/10 headache 4-7/10  Neck and shoulder    4/10 headache pain post       Objective:   Observation/Posture:fwd head flexed CT junction, protraction scapular      initial initial 8/3/23 8/3/23 9/20/23 9/20/23   Cervical AROM: deg Pain (+/-) deg Pain (+/-) deg pn   Flexion 55 -                     57                    -                   57                -   Extension 62 dizziness                    62                  Dizziness upon return from ext                   63 dizziness   R Sidebend 42 - stretch sl soreness back of neck 35   pn R upper , levator 32    Stretch on left upper tra   L Sidebend 42 -stretch 42 Pn R distal acromion 38 Stretch on right side   R Rotation 62 Pn right cervical 3.5/10 60 Pn cervical and upper 5/10 60 Pn upper trap and cervical 5/10 increases tightness in migraine   L Rotation 58 No pain just stretch 58 Small cervical p/s 2/10/  mostly stretch 58  Mostly stretch    Protrusion WNL         Retraction WNL                  Shoulder AROM: deg     initial Initial 8/3/23 8/3/23 9/20/23 9/20/23     R    L R L R L    Flex 170 sl pn 163        170 slight UT and inferior cuff pn 160 stretch   170 good stretch 163   Abd 168 tight 170 pn   160 abd tight   170 pn 170 tight 170 tight   ER WNL WNL    WNL WNL WNL WNL   IR WNL WNL   WNL WNL WNL WNL   Strength UE:   -/5 BUE to be determined                 Flexibility: WNL, min, mod severe flexibility limitations   initial initial     R L   Upper trap minimally restricted moderately restricted   Levator scap minimally restricted moderately restricted   Scalenes minimally restricted moderately restricted   Pec Major moderately restricted moderately restricted   Pec Minor moderately restricted moderately restricted   Lats minimally restricted minimally restricted      Palpation: elevated ms tone at both upper traps, both levator scaps, suboccipitals, cervical p/s  Upper Limb Tension Tests: TBD  Neuro Screen: intact sensation,         Goals: 8-12 sessions   Pt will be independent and compliant with comprehensive HEP to maintain progress achieved in PT to reduce ms tone, increase postural support ms for reduction of scap protraction  Patient will demonstrate a reduction in max Headache and neck area pain at cervical region from  max of  10/10 down to 4-5/10 (50%)  Pt will have improved thoracic PA mobility to WNL to improve cervical ROM as well as promote upright posturing and decreased pain with cervical AROM   Pt will have improved thoracic PA mobility to WNL to improve cervical ROM as well as promote upright posturing and decreased pain with cervical AROM   Patient with independent UE extremity, scapular stabilitzation ms and cervical strengthening ex to improved ms length and improve pectoral  flexibility  to decrease strain on cervical spine        Assessment: Patient with continued symptoms with today migraine was elevated as day went on. Migraine pain decreased during and after session. Elevated ms tone at left greater than right UT and LS and sub-occipitals.  Patient has not been seen since 8/8/23 with treatment emphasis on Carpel Tunnel Emphasis on cervical treatment. Now able to resume treatment for cervical spine      Plan: Continue PT per original plan for therapeutic exercises, posture retraining, therapeutic activities, manual treatment, neuromuscular reeducation, therapeutic pain neuroscience education, patient education, modalities as needed.; Assess response and carry over to tone reduction. Work on suboccipital area next session and trial Intermittent siu like cervical traction if responding to manual traction      DB=Diaphragmatic breathing, LTR=lower trunk rotation, SKTC=Single knee to chest, DKTC=double knee to chest, BFB=pressure biofeedback training, PPT= posterior pelvic tilt, TFL=Tensor fascia maria eugenia, RF=rectus femoris, H/L=hooklying  Position Therap.  Exercise HEP 6/5/23  2/12 6/13/23  3/12 6/27/23  4/12 8/3/23  5/12 8/8/23  6/12 9/20/23    Reassessment, cervical and shoulder bilateral AROM, posture,      X  X    H/L Onto 1/2 foam roll along spine  Y' and T's stretches      X 1 x 30 secs each    supine PROM cervical rotation and AAROM with correct axis of rotation       X    seated AROM shoulder flex/abd, cervical AROM all major motions       X    seated Lateral neck flexion stretch B 3 x 15 secs with DB H X X   X     seated Diaphragmatic breathing ex (DB) H,  X 10 x X 10x   X  X    Seated Cervical retractions 10x 3 secs H X 10x X 10x  X 5 x  5 secs     Seated  Scapular retractions  H X  5x X 5X  X 5x 3 secs     Seated  Shoulder rolls and DB combined  X 10x  X 10x        Seated  OH pulleys flex and abd   X 2-3 mins       standing Posture training with mirror feedback use of two mirrors for cervical retractions and scapular retractions   X 5 x each       Seated  Levator scap stretches H         Seated  Passive pect major and minor assisted stretches by therapist sustained    3 mins 3  min 30 secs      Manual rx          Supine OA release   2-3 mins 2-3 mins 3 min 3 mins   x   Seated with head on plinth STM to both C-p/s, and both UT and LS, and sub-occipitals  moderate pressure      X  x   supine Deep pressure to left and right Lev scap       X    Supine  Deep pressure to left and right UT       X    supine           Supine  Light Sustained pressure with DB at both upper traps to tolerance and to both Levator scap  5 mins 5-6 min 5-6 mins 5-6 mins     supine Sustained pressure with DB to sub 0ccipitals  X  X  X   x   Supine  Light STM to right >left cervical p/s  X     X     supine More light to moderate STM to both cervical p/s   X  X  X    supine Intermittent manual cervical traction    10 x 10 secs   X seated manual X supine    intermittent C-traction           Intermittent C-traction supine  # max pull,  15 secs on 15 secs off 10 x cycle      For next session held                         H = HEP. Pt given copies of all exercise for home program.  X = exercise completed as described previously  Access Code: [place]ren [place]: [email or web address]/  Date: 06/01/2023  Prepared by: Julious      Exercises  - Seated Scapular Retraction  - 3 x daily - 7 x weekly - 1 sets - 5 reps - 2 s hold  - Seated Passive Cervical Retraction  - 3 x daily - 7 x weekly - 1 sets - 5 reps - 2 secs hold  - Seated Cervical Sidebending AROM  - 3 x daily - 7 x weekly - 1 sets - 5 reps - 15 s hold  - Seated Cervical Sidebending AROM (Mirrored)  - 3 x daily - 7 x weekly - 1 sets - 5 reps - 15 s hold    Charged Units Units Minutes   Neuro     Ther ex 1  15   Gait     Man Tx 3 38   US          Total Timed 4 53   Total Tx Time  53      Access Code: [email or web address]  URL: [email or web address]/  Date: 08/04/2023  Prepared by: Marisol     Exercises  - Side Stepping  - 1 x daily - 2-3 x weekly - 1-2 lengths lengths  - Forward Walking  - 1 x daily - 2-3 x weekly  - Backward Walking  - 1 x daily - 2-3 x weekly - 3 sets - 10 reps  - Standing Hip Circles at [employer]  - 1 x daily - 2-3 x weekly - 3 sets - 10 reps  - Standing March at [facility]  - 1 x daily - 2-3 x weekly - 3 sets - 10 reps  - Heel Toe Raises at [facility]  - 1 x daily - 2-3 x weekly - 3 sets - 10 reps  - Standing Hip Abduction Adduction at [employer]  - 1 x daily - 2-3 x weekly - 3 sets - 10 reps  - Flutter Kick on Back with Noodle  - 1 x daily - 2-3 x weekly - 2-4  lengths

## 2023-09-21 ENCOUNTER — OFFICE VISIT (OUTPATIENT)
Dept: PULMONOLOGY | Facility: CLINIC | Age: 53
End: 2023-09-21

## 2023-09-21 VITALS
HEART RATE: 103 BPM | SYSTOLIC BLOOD PRESSURE: 128 MMHG | OXYGEN SATURATION: 97 % | WEIGHT: 240 LBS | BODY MASS INDEX: 42.52 KG/M2 | DIASTOLIC BLOOD PRESSURE: 90 MMHG | HEIGHT: 63 IN | RESPIRATION RATE: 14 BRPM

## 2023-09-21 DIAGNOSIS — G47.33 OSA (OBSTRUCTIVE SLEEP APNEA): Primary | ICD-10-CM

## 2023-09-21 PROCEDURE — 99213 OFFICE O/P EST LOW 20 MIN: CPT | Performed by: INTERNAL MEDICINE

## 2023-09-21 PROCEDURE — 3008F BODY MASS INDEX DOCD: CPT | Performed by: INTERNAL MEDICINE

## 2023-09-21 PROCEDURE — 3074F SYST BP LT 130 MM HG: CPT | Performed by: INTERNAL MEDICINE

## 2023-09-21 PROCEDURE — 3080F DIAST BP >= 90 MM HG: CPT | Performed by: INTERNAL MEDICINE

## 2023-09-21 NOTE — PROGRESS NOTES
Referring Physician  Sudhir Guadarrama MD    History of Present Illness  Patient seen today for follow-up visit to pulmonary clinic. Did not tolerate CPAP device. Frequently ended up scratching her face while using CPAP device. Inquiring about other treatment options including oral appliance. Medications  Galcanezumab-gnlm (EMGALITY) 120 MG/ML Subcutaneous Solution Auto-injector, Inject 120 mg as directed every 30 (thirty) days. , Disp: 1 mL, Rfl: 5  hydrOXYzine 25 MG Oral Tab, Take 0.5-1 tablets (12.5-25 mg total) by mouth 3 (three) times daily as needed for Anxiety (or insomnia). , Disp: 90 tablet, Rfl: 0  venlafaxine  MG Oral Capsule SR 24 Hr, Take 1 capsule (150 mg total) by mouth daily. , Disp: 90 capsule, Rfl: 0  traZODone 50 MG Oral Tab, Take 1 tablet (50 mg total) by mouth nightly as needed for Sleep., Disp: 90 tablet, Rfl: 0  ondansetron 4 MG Oral Tablet Dispersible, Take 1 tablet (4 mg total) by mouth every 8 (eight) hours as needed for Nausea., Disp: 15 tablet, Rfl: 0  Rimegepant Sulfate (NURTEC) 75 MG Oral Tablet Dispersible, Take 75 mg by mouth as needed. Take one tablet at onset of migraine. Maximum dose in 24 hours is 1 tablet (75mg). , Disp: 8 tablet, Rfl: 11  lisinopril 10 MG Oral Tab, Take 1 tablet (10 mg total) by mouth nightly., Disp: 90 tablet, Rfl: 3  ATORVASTATIN 20 MG Oral Tab, TAKE 1 TABLET(20 MG) BY MOUTH EVERY NIGHT, Disp: 90 tablet, Rfl: 3  Multiple Vitamins-Iron (MULTI-DAY PLUS IRON) Oral Tab, Take by mouth., Disp: , Rfl:   Meloxicam 15 MG Oral Tab, Take 1 tablet (15 mg total) by mouth daily. , Disp: 30 tablet, Rfl: 0  Melatonin 10 MG Oral Cap, Take by mouth., Disp: , Rfl:   loratadine 10 MG Oral Tab, Take 1 tablet (10 mg total) by mouth daily. , Disp: , Rfl:   Mirabegron ER (MYRBETRIQ) 50 MG Oral Tablet 24 Hr, TAKE 1 TABLET(50 MG) BY MOUTH DAILY (Patient not taking: Reported on 9/21/2023), Disp: 30 tablet, Rfl: 11    No current facility-administered medications on file prior to visit. Allergies    Marijuana (Cannabis*    PAIN  Penicillin G            NAUSEA AND VOMITING  Lactose                 OTHER (SEE COMMENTS)    Comment:Other    Physical Exam  Constitutional: no acute distress  HEENT: PERRL  Neck: supple, no JVD  Cardio: RRR, S1 S2  Respiratory: clear to auscultation bilaterally, no wheezing, rales, rhonchi, crackles  GI: abdomen soft, non tender, active bowel sounds, no organomegaly  Extremities: no clubbing, cyanosis, edema  Neurologic: no gross motor deficits  Skin: warm, dry  Lymphatic: no supraclavicular lymphadenopathy     Assessment  1. Moderate SADIE    Plan  -Patient presents today for follow-up visit to pulmonary clinic. Did not tolerate CPAP device. Wanting to consider other treatment options. I am agreeable with oral appliance. Patient states her dentist has capability of custom fitting oral appliance. I have provided patient a copy of her sleep study.     Pawan Boyer DO  Pulmonary Medicine  [facility], [facility]  9/21/2023  1:11 PM

## 2023-09-25 ENCOUNTER — OFFICE VISIT (OUTPATIENT)
Dept: PHYSICAL THERAPY | Facility: HOSPITAL | Age: 53
End: 2023-09-25
Attending: FAMILY MEDICINE
Payer: COMMERCIAL

## 2023-09-25 PROCEDURE — 97140 MANUAL THERAPY 1/> REGIONS: CPT

## 2023-09-25 PROCEDURE — 97110 THERAPEUTIC EXERCISES: CPT

## 2023-09-25 NOTE — PROGRESS NOTES
Referring Physician: Rodriguez Tomlinsno DO     Date of Onset:  chronic  Date of Eval:    Diagnosis: Degeneration of cervical intervertebral disc (M50.30)  Cervicogenic headache (G44.86)  Insurance: [facility] PPO  Precautions:  Sensitive to massage  Neck Disability Index Score  Pre rx: Neck Disability Index Score  Score: 32 % (6/1/2023  9:32 AM)    Subjective: Patient reports Migraine HA today earlier in AM pretty high and over the weekend but lower upon arrival at 2:45pm today not as bad but neck and shoulder pain elevated Pain reports multiple factors play into headaches and stress and weather often  linked to pain per patient            Date 6/5/23 6/13/23 6/27/23 8/3/23 8/8/23 9/20/23 9/25/23             Pain Range           Pain current   5/10 HA, shoulder tightness  Decreased post rx 5/10  5/10  pre headache at 4/10 post  neck pre 3/10 ,  2/10 post 4.5/10 headache   Neck pre-rx 4.5/10  Right shoulder/upper trap stiffness, small on  2.5/10 headache    Neck: and shoulder area 5/10, 7/10 headache 4-7/10  Neck and shoulder    4/10 headache pain post 1.5/10 migraine pre and post       Objective:   Observation/Posture:fwd head flexed CT junction, protraction scapular      initial initial 8/3/23 8/3/23 9/20/23 9/20/23 9/25/23 9/25/23   Cervical AROM: deg Pain (+/-) deg Pain (+/-) deg pn deg pn   Flexion 55 -                     57                    -                   57                -                NT    Extension 62 dizziness                    62                  Dizziness upon return from ext                   63 dizziness                 NT    R Sidebend 42 - stretch sl soreness back of neck 35   pn R upper , levator 32    Stretch on left upper tra                38     Stretch on left UT   L Sidebend 42 -stretch 42 Pn R distal acromion 38 Stretch on right side                42    R Rotation 62 Pn right cervical 3.5/10 60 Pn cervical and upper 5/10 60 Pn upper trap and cervical 5/10 increases tightness in migraine              80              Stretch right upper trap and slight pn   L Rotation 58 No pain just stretch 58 Small cervical p/s 2/10/  mostly stretch 58  Mostly stretch                64 Stretch no significant pn   Protrusion WNL           Retraction WNL                    Shoulder AROM: deg     initial Initial 8/3/23 8/3/23 9/20/23 9/20/23     R    L R L R L    Flex 170 sl pn 163        170 slight UT and inferior cuff pn 160 stretch   170 good stretch 163   Abd 168 tight 170 pn   160 abd tight   170 pn 170 tight 170 tight   ER WNL WNL    WNL WNL WNL WNL   IR WNL WNL   WNL WNL WNL WNL   Strength UE:   -/5 BUE to be determined                 Flexibility: WNL, min, mod severe flexibility limitations   initial initial     R L   Upper trap minimally restricted moderately restricted   Levator scap minimally restricted moderately restricted   Scalenes minimally restricted moderately restricted   Pec Major moderately restricted moderately restricted   Pec Minor moderately restricted moderately restricted   Lats minimally restricted minimally restricted      Palpation: elevated ms tone at both upper traps, both levator scaps, suboccipitals, cervical p/s  Upper Limb Tension Tests: TBD  Neuro Screen: intact sensation,         Goals: 8-12 sessions   Pt will be independent and compliant with comprehensive HEP to maintain progress achieved in PT to reduce ms tone, increase postural support ms for reduction of scap protraction  Patient will demonstrate a reduction in max Headache and neck area pain at cervical region from  max of  10/10 down to 4-5/10 (50%)  Pt will have improved thoracic PA mobility to WNL to improve cervical ROM as well as promote upright posturing and decreased pain with cervical AROM   Pt will have improved thoracic PA mobility to WNL to improve cervical ROM as well as promote upright posturing and decreased pain with cervical AROM   Patient with independent UE extremity, scapular stabilitzation ms and cervical strengthening ex to improved ms length and improve pectoral  flexibility  to decrease strain on cervical spine        Assessment: Patient with continued symptoms with today migraine was elevated in the AM but had improved by mid day as rain had moved through. . Migraine pain was low during session. Some tenderness with deep palpation at sub-occipitals. Improved cervical rotation and lateral flexion as compared to last session measures and patient with best rotation measures today as compared to all previous measures. Plan: Continue PT per original plan for therapeutic exercises, posture retraining, therapeutic activities, manual treatment, neuromuscular reeducation, therapeutic pain neuroscience education, patient education, modalities as needed.; Assess response and carry over to tone reduction. Work trial Intermittent mechanical cervical and possible 1/2 foam roll stretches. DB=Diaphragmatic breathing, LTR=lower trunk rotation, SKTC=Single knee to chest, DKTC=double knee to chest, BFB=pressure biofeedback training, PPT= posterior pelvic tilt, TFL=Tensor fascia maria eugenia, RF=rectus femoris, H/L=hooklying  Position Therap.  Exercise HEP 6/5/23  2/12 6/13/23  3/12 6/27/23  4/12 8/3/23  5/12 8/8/23  6/12 9/20/23  7/12 9/25/23  8/12    Reassessment, cervical and shoulder bilateral AROM, posture,      X  X  X cervical rotation and lateral flexion   H/L Onto 1/2 foam roll along spine  Y' and T's stretches      X 1 x 30 secs each     supine PROM cervical rotation and AAROM with correct axis of rotation       X  x   seated AROM shoulder flex/abd, cervical AROM all major motions       X     seated Lateral neck flexion stretch B 3 x 15 secs with DB H X X   X  X  X    seated Diaphragmatic breathing ex (DB) H,  X 10 x X 10x   X      Seated Cervical retractions 10x 3 secs H X 10x X 10x  X 5 x  5 secs      Seated  Scapular retractions  H X  5x X 5X  X 5x 3 secs      Seated  Shoulder rolls and DB combined  X 10x X 10x         Seated  OH pulleys flex and abd   X 2-3 mins        standing Posture training with mirror feedback use of two mirrors for cervical retractions and scapular retractions   X 5 x each        Seated  Levator scap stretches H          Seated  Passive pect major and minor assisted stretches by therapist sustained    3 mins 3  min 30 secs       Manual rx           Supine OA release   2-3 mins 2-3 mins 3 min 3 mins   x x   Seated with head on plinth STM to both C-p/s, and both UT and LS, and sub-occipitals  moderate pressure      X  x x   supine Deep pressure to left and right Lev scap       X  x   Supine  Deep pressure to left and right UT       X  x   supine MWM for lateral flexion and Rotation with grade 2-3 SNAG C3-6        X   Supine  Light Sustained pressure with DB at both upper traps to tolerance and to both Levator scap  5 mins 5-6 min 5-6 mins 5-6 mins   X deep pressure    supine Sustained pressure with DB to sub 0ccipitals  X  X  X   x X   Supine  Light STM to right >left cervical p/s  X     X   X   supine More light to moderate STM to both cervical p/s   X  X  X  X    supine Intermittent manual cervical traction    10 x 10 secs   X seated manual X supine     intermittent C-traction            Intermittent C-traction supine  # max pull,  15 secs on 15 secs off 10 x cycle      For next session held                            H = HEP. Pt given copies of all exercise for home program.  X = exercise completed as described previously  Access Code: Abby Harper: [email or web address]  Date: 06/01/2023  Prepared by: Maya Meeks     Exercises  - Seated Scapular Retraction  - 3 x daily - 7 x weekly - 1 sets - 5 reps - 2 s hold  - Seated Passive Cervical Retraction  - 3 x daily - 7 x weekly - 1 sets - 5 reps - 2 secs hold  - Seated Cervical Sidebending AROM  - 3 x daily - 7 x weekly - 1 sets - 5 reps - 15 s hold  - Seated Cervical Sidebending AROM (Mirrored)  - 3 x daily - 7 x weekly - 1 sets - 5 reps - 15 s hold    Charged Units Units Minutes   Neuro     Ther ex 1  13   Gait     Man Tx 2 30   US          Total Timed 3 43   Total Tx Time  43      Access Code: [email or web address]  URL: [email or web address]  Date: 08/04/2023  Prepared by: Colin Ledesma    Exercises  - Side Stepping  - 1 x daily - 2-3 x weekly - 1-2 lengths lengths  - Forward Walking  - 1 x daily - 2-3 x weekly  - Backward Walking  - 1 x daily - 2-3 x weekly - 3 sets - 10 reps  - Standing Hip Circles at [employer]  - 1 x daily - 2-3 x weekly - 3 sets - 10 reps  - Standing March at [employer]  - 1 x daily - 2-3 x weekly - 3 sets - 10 reps  - Heel Toe Raises at Pool Wall  - 1 x daily - 2-3 x weekly - 3 sets - 10 reps  - Standing Hip Abduction Adduction at Pool Wall  - 1 x daily - 2-3 x weekly - 3 sets - 10 reps  - Flutter Kick on Back with Noodle  - 1 x daily - 2-3 x weekly - 2-4  lengths

## 2023-09-27 ENCOUNTER — OFFICE VISIT (OUTPATIENT)
Dept: PHYSICAL THERAPY | Facility: HOSPITAL | Age: 53
End: 2023-09-27
Attending: FAMILY MEDICINE
Payer: COMMERCIAL

## 2023-09-27 PROCEDURE — 97110 THERAPEUTIC EXERCISES: CPT

## 2023-09-27 PROCEDURE — 97140 MANUAL THERAPY 1/> REGIONS: CPT

## 2023-09-27 PROCEDURE — 97012 MECHANICAL TRACTION THERAPY: CPT

## 2023-09-27 NOTE — PROGRESS NOTES
Referring Physician: Deepak Bauman DO     Date of Onset:  chronic  Date of Eval:    Diagnosis: Degeneration of cervical intervertebral disc (M50.30)  Cervicogenic headache (G44.86)  Insurance: [facility] PPO  Precautions:  Sensitive to massage  Neck Disability Index Score  Pre rx: Neck Disability Index Score  Score: 32 % (6/1/2023  9:32 AM)    Subjective: Patient reports after last session migraine not elevated but during the PM today migraine pain had increased to 5/10 pre rx at 6:45 pm.  Pain reports neck and shoulder pain fairly low pre-rx. Trial traction mechanical home version today.               Date 6/5/23 6/13/23 6/27/23 8/3/23 8/8/23 9/20/23 9/25/23 9/27/23              Pain Range            Pain current   5/10 HA, shoulder tightness  Decreased post rx 5/10  5/10  pre headache at 4/10 post  neck pre 3/10 ,  2/10 post 4.5/10 headache   Neck pre-rx 4.5/10  Right shoulder/upper trap stiffness, small on  2.5/10 headache    Neck: and shoulder area 5/10, 7/10 headache 4-7/10  Neck and shoulder    4/10 headache pain post 1.5/10 migraine pre and post 5/10 pre migraine    4/10 post rx pain at migraine     Neck and shoulder 3/10 pre,     mild increased soreness at site of traction contact post             Objective:   Observation/Posture:fwd head flexed CT junction, protraction scapular      initial initial 8/3/23 8/3/23 9/20/23 9/20/23 9/25/23 9/25/23 9/27/23 9/27/23   Cervical AROM: deg Pain (+/-) deg Pain (+/-) deg pn deg pn deg pn   Flexion 55 -                     57                    -                   57                -                NT      Extension 62 dizziness                    62                  Dizziness upon return from ext                   63 dizziness                 NT      R Sidebend 42 - stretch sl soreness back of neck 35   pn R upper , levator 32    Stretch on left upper tra                38     Stretch on left UT  36 deg  post rx Stretch on left UT   L Sidebend 42 -stretch 42 Pn R distal acromion 38 Stretch on right side                42   42 deg  post rx Stretch  on right UT   R Rotation 62 Pn right cervical 3.5/10 60 Pn cervical and upper 5/10 60 Pn upper trap and cervical 5/10 increases tightness in migraine              80              Stretch right upper trap and slight pn     L Rotation 58 No pain just stretch 58 Small cervical p/s 2/10/  mostly stretch 58  Mostly stretch                64 Stretch no significant pn     Protrusion WNL             Retraction WNL                      Shoulder AROM: deg     initial Initial 8/3/23 8/3/23 9/20/23 9/20/23     R    L R L R L    Flex 170 sl pn 163        170 slight UT and inferior cuff pn 160 stretch   170 good stretch 163   Abd 168 tight 170 pn   160 abd tight   170 pn 170 tight 170 tight   ER WNL WNL    WNL WNL WNL WNL   IR WNL WNL   WNL WNL WNL WNL   Strength UE:   -/5 BUE to be determined                 Flexibility: WNL, min, mod severe flexibility limitations   initial initial     R L   Upper trap minimally restricted moderately restricted   Levator scap minimally restricted moderately restricted   Scalenes minimally restricted moderately restricted   Pec Major moderately restricted moderately restricted   Pec Minor moderately restricted moderately restricted   Lats minimally restricted minimally restricted      Palpation: elevated ms tone at both upper traps, both levator scaps, suboccipitals, cervical p/s  Upper Limb Tension Tests: TBD  Neuro Screen: intact sensation,         Goals: 8-12 sessions   Pt will be independent and compliant with comprehensive HEP to maintain progress achieved in PT to reduce ms tone, increase postural support ms for reduction of scap protraction  Patient will demonstrate a reduction in max Headache and neck area pain at cervical region from  max of  10/10 down to 4-5/10 (50%)  Pt will have improved thoracic PA mobility to WNL to improve cervical ROM as well as promote upright posturing and decreased pain with cervical AROM   Pt will have improved thoracic PA mobility to WNL to improve cervical ROM as well as promote upright posturing and decreased pain with cervical AROM   Patient with independent UE extremity, scapular stabilitzation ms and cervical strengthening ex to improved ms length and improve pectoral  flexibility  to decrease strain on cervical spine        Assessment: Patient with report 5/10 Migraine pain with some reduction of pain down to 4/10 post session with trial mechanical cervical traction with some decreased pain post (migraine)   Less tone at sub-occipitals today pre rx. I.      Plan: Continue PT per original plan for therapeutic exercises, posture retraining, therapeutic activities, manual treatment, neuromuscular reeducation, therapeutic pain neuroscience education, patient education, modalities as needed.; Assess response and carry over to tone reduction. Assess response to  trial Intermittent mechanical cervical traction. Measure rotation pre rx next session. DB=Diaphragmatic breathing, LTR=lower trunk rotation, SKTC=Single knee to chest, DKTC=double knee to chest, BFB=pressure biofeedback training, PPT= posterior pelvic tilt, TFL=Tensor fascia maria eugenia, RF=rectus femoris, H/L=hooklying  Position Therap.  Exercise HEP 6/5/23  2/12 6/13/23  3/12 6/27/23  4/12 8/3/23  5/12 8/8/23  6/12 9/20/23  7/12 9/25/23  8/12 9/27/23  9/12    Reassessment, cervical and shoulder bilateral AROM, posture,      X  X  X cervical rotation and lateral flexion X lateral flexion only   H/L  Contract relax Upper traps post         X 3 x 5 secs left and right   H/L Onto 1/2 foam roll along spine  Y' and T's stretches      X 1 x 30 secs each      supine PROM cervical rotation and AAROM with correct axis of rotation       X  x    seated AROM shoulder flex/abd, cervical AROM all major motions       X      seated Lateral neck flexion stretch B 3 x 15 secs with DB H X X   X  X  X  X brief post   seated Diaphragmatic breathing ex (DB) H,  X 10 x X 10x   X       Seated Cervical retractions 10x 3 secs H X 10x X 10x  X 5 x  5 secs       Seated  Scapular retractions  H X  5x X 5X  X 5x 3 secs       Seated  Shoulder rolls and DB combined  X 10x  X 10x          Seated  OH pulleys flex and abd   X 2-3 mins         standing Posture training with mirror feedback use of two mirrors for cervical retractions and scapular retractions   X 5 x each         Seated  Levator scap stretches H           Seated  Passive pect major and minor assisted stretches by therapist sustained    3 mins 3  min 30 secs        Manual rx            Supine OA release   2-3 mins 2-3 mins 3 min 3 mins   x x x   Seated with head on plinth STM to both C-p/s, and both UT and LS, and sub-occipitals  moderate pressure      X  x x x   supine Deep pressure to left and right Lev scap       X  x x   Supine  Deep pressure to left and right UT       X  x x   supine MWM for lateral flexion and Rotation with grade 2-3 SNAG C3-6        X    Supine  Light Sustained pressure with DB at both upper traps to tolerance and to both Levator scap  5 mins 5-6 min 5-6 mins 5-6 mins   X deep pressure  x   supine Sustained pressure with DB to sub 0ccipitals  X  X  X   x X x   Supine  Light STM to right >left cervical p/s  X     X   X    supine More light to moderate STM to both cervical p/s   X  X  X  X     supine Intermittent manual cervical traction    10 x 10 secs   X seated manual X supine      intermittent C-traction             Intermittent C-traction supine  # max pull,  15 secs on 15 secs off 10 x cycle      For next session held  10 x 30 secs holds@ 15 #                             H = HEP. Pt given copies of all exercise for home program.  X = exercise completed as described previously  Access Code: Devang Bojorquez: [email or web address]  Date: 06/01/2023  Prepared by: Tracey Arana     Exercises  - Seated Scapular Retraction  - 3 x daily - 7 x weekly - 1 sets - 5 reps - 2 s hold  - Seated Passive Cervical Retraction  - 3 x daily - 7 x weekly - 1 sets - 5 reps - 2 secs hold  - Seated Cervical Sidebending AROM  - 3 x daily - 7 x weekly - 1 sets - 5 reps - 15 s hold  - Seated Cervical Sidebending AROM (Mirrored)  - 3 x daily - 7 x weekly - 1 sets - 5 reps - 15 s hold    Charged Units Units Minutes   Neuro     Ther ex 1  10   Gait     Man Tx 2 30   US     Mechanical traction 1 15   Total Timed 4 55   Total Tx Time  55      Access Code: [email or web address]  URL: [email or web address]  Date: 08/04/2023  Prepared by: Daysi Cabrera    Exercises  - Side Stepping  - 1 x daily - 2-3 x weekly - 1-2 lengths lengths  - Forward Walking  - 1 x daily - 2-3 x weekly  - Backward Walking  - 1 x daily - 2-3 x weekly - 3 sets - 10 reps  - Standing Hip Circles at [employer]  - 1 x daily - 2-3 x weekly - 3 sets - 10 reps  - Standing March at [employer]  - 1 x daily - 2-3 x weekly - 3 sets - 10 reps  - Heel Toe Raises at [employer]  - 1 x daily - 2-3 x weekly - 3 sets - 10 reps  - Standing Hip Abduction Adduction at Pool Wall  - 1 x daily - 2-3 x weekly - 3 sets - 10 reps  - Flutter Kick on Back with Noodle  - 1 x daily - 2-3 x weekly - 2-4  lengths

## 2023-09-29 ENCOUNTER — HOSPITAL ENCOUNTER (OUTPATIENT)
Facility: HOSPITAL | Age: 53
Setting detail: HOSPITAL OUTPATIENT SURGERY
Discharge: HOME | End: 2023-09-29
Attending: INTERNAL MEDICINE | Admitting: INTERNAL MEDICINE
Payer: COMMERCIAL

## 2023-09-29 ENCOUNTER — ANESTHESIA (OUTPATIENT)
Dept: ENDOSCOPY | Facility: HOSPITAL | Age: 53
End: 2023-09-29
Payer: COMMERCIAL

## 2023-09-29 ENCOUNTER — ANESTHESIA EVENT (OUTPATIENT)
Dept: ENDOSCOPY | Facility: HOSPITAL | Age: 53
End: 2023-09-29
Payer: COMMERCIAL

## 2023-09-29 VITALS
RESPIRATION RATE: 18 BRPM | WEIGHT: 240 LBS | BODY MASS INDEX: 42.52 KG/M2 | HEIGHT: 63 IN | DIASTOLIC BLOOD PRESSURE: 84 MMHG | TEMPERATURE: 98 F | HEART RATE: 73 BPM | SYSTOLIC BLOOD PRESSURE: 104 MMHG | OXYGEN SATURATION: 98 %

## 2023-09-29 DIAGNOSIS — Z12.11 SPECIAL SCREENING FOR MALIGNANT NEOPLASMS, COLON: ICD-10-CM

## 2023-09-29 PROCEDURE — 45385 COLONOSCOPY W/LESION REMOVAL: CPT | Performed by: INTERNAL MEDICINE

## 2023-09-29 PROCEDURE — 0DBN8ZX EXCISION OF SIGMOID COLON, VIA NATURAL OR ARTIFICIAL OPENING ENDOSCOPIC, DIAGNOSTIC: ICD-10-PCS | Performed by: INTERNAL MEDICINE

## 2023-09-29 RX ORDER — LIDOCAINE HYDROCHLORIDE 10 MG/ML
INJECTION, SOLUTION EPIDURAL; INFILTRATION; INTRACAUDAL; PERINEURAL AS NEEDED
Status: DISCONTINUED | OUTPATIENT
Start: 2023-09-29 | End: 2023-09-29 | Stop reason: SURG

## 2023-09-29 RX ORDER — SODIUM CHLORIDE, SODIUM LACTATE, POTASSIUM CHLORIDE, CALCIUM CHLORIDE 600; 310; 30; 20 MG/100ML; MG/100ML; MG/100ML; MG/100ML
INJECTION, SOLUTION INTRAVENOUS CONTINUOUS
OUTPATIENT
Start: 2023-09-29

## 2023-09-29 RX ORDER — SODIUM CHLORIDE, SODIUM LACTATE, POTASSIUM CHLORIDE, CALCIUM CHLORIDE 600; 310; 30; 20 MG/100ML; MG/100ML; MG/100ML; MG/100ML
INJECTION, SOLUTION INTRAVENOUS CONTINUOUS
Status: DISCONTINUED | OUTPATIENT
Start: 2023-09-29 | End: 2023-09-29

## 2023-09-29 RX ORDER — NALOXONE HYDROCHLORIDE 0.4 MG/ML
80 INJECTION, SOLUTION INTRAMUSCULAR; INTRAVENOUS; SUBCUTANEOUS AS NEEDED
OUTPATIENT
Start: 2023-09-29 | End: 2023-09-29

## 2023-09-29 RX ADMIN — SODIUM CHLORIDE, SODIUM LACTATE, POTASSIUM CHLORIDE, CALCIUM CHLORIDE: 600; 310; 30; 20 INJECTION, SOLUTION INTRAVENOUS at 10:21:00

## 2023-09-29 RX ADMIN — SODIUM CHLORIDE, SODIUM LACTATE, POTASSIUM CHLORIDE, CALCIUM CHLORIDE: 600; 310; 30; 20 INJECTION, SOLUTION INTRAVENOUS at 10:42:00

## 2023-09-29 RX ADMIN — SODIUM CHLORIDE, SODIUM LACTATE, POTASSIUM CHLORIDE, CALCIUM CHLORIDE: 600; 310; 30; 20 INJECTION, SOLUTION INTRAVENOUS at 10:34:00

## 2023-09-29 RX ADMIN — LIDOCAINE HYDROCHLORIDE 50 MG: 10 INJECTION, SOLUTION EPIDURAL; INFILTRATION; INTRACAUDAL; PERINEURAL at 10:26:00

## 2023-09-29 NOTE — DISCHARGE INSTRUCTIONS

## 2023-09-29 NOTE — OPERATIVE REPORT
COLONOSCOPY REPORT    Oleg Ram     1970 Age 50year old   PCP Agata Gupta MD Endoscopist Reginald Alcantara MD     Date of procedure: 23    Procedure: Colonoscopy w/snare polypectomy    Pre-operative diagnosis: screening    Post-operative diagnosis: see impression    Medications: MAC    Withdrawal time: 6 minutes    Procedure:  Informed consent was obtained from the patient after the risks of the procedure were discussed, including but not limited to bleeding, perforation, aspiration, infection, or possibility of a missed lesion. After discussions of the risks/benefits and alternatives to this procedure, as well as the planned sedation, the patient was placed in the left lateral decubitus position and begun on continuous blood pressure pulse oximetry and EKG monitoring and this was maintained throughout the procedure. Once an adequate level of sedation was obtained a digital rectal exam was completed. Then the lubricated tip of the [id number] diagnostic video colonoscope was inserted and advanced without difficulty to the cecum using the CO2 insufflation technique. The cecum was identified by localizing the trifold, the appendix and the ileocecal valve. Withdrawal was begun with thorough washing and careful examination of the colonic walls and folds. A routine second examination of the cecum/ascending colon was performed. Retroflexion was performed in the rectum. Photodocumentation was obtained. Glynn bowel prep score of 9 (Right colon-3; Transverse colon-3, Left colon-3). I then carefully withdrew the instrument from the patient who tolerated the procedure well. Complications: none. Findings:   -- BLANE: normal rectal tone, no masses palpated. -- 1 polyp(s) noted as follows:      A. 3 mm polyp in the distal sigmoid colon; sessile morphology; cold snare polypectomy and retrieved. -- A retroflexed view of the rectum revealed no abnormalities.     -- The colonic mucosa throughout the colon showed normal vascular pattern, without evidence of angioectasias or inflammation. Impression:   One diminutive polyp resected and retrieved    Recommend:  Pending pathology, repeat colonoscopy in 7-10 years.    >>>If tissue was obtained and you have not received your pathology results either by phone or letter within 2 weeks, please call our office at [phone].     Specimens: polyp    Blood loss: <1 ml      Kamini Pang MD  [facility], [facility]

## 2023-09-29 NOTE — H&P
History & Physical Examination    Patient Name: Freda Norris  MRN: [id number]  [facility]: [id number]  YOB: 1970    Diagnosis: crc screening    Galcanezumab-gnlm (EMGALITY) 120 MG/ML Subcutaneous Solution Auto-injector, Inject 120 mg as directed every 30 (thirty) days. , Disp: 1 mL, Rfl: 5,   hydrOXYzine 25 MG Oral Tab, Take 0.5-1 tablets (12.5-25 mg total) by mouth 3 (three) times daily as needed for Anxiety (or insomnia). , Disp: 90 tablet, Rfl: 0  venlafaxine  MG Oral Capsule SR 24 Hr, Take 1 capsule (150 mg total) by mouth daily. , Disp: 90 capsule, Rfl: 0  traZODone 50 MG Oral Tab, Take 1 tablet (50 mg total) by mouth nightly as needed for Sleep., Disp: 90 tablet, Rfl: 0  ondansetron 4 MG Oral Tablet Dispersible, Take 1 tablet (4 mg total) by mouth every 8 (eight) hours as needed for Nausea., Disp: 15 tablet, Rfl: 0  Rimegepant Sulfate (NURTEC) 75 MG Oral Tablet Dispersible, Take 75 mg by mouth as needed. Take one tablet at onset of migraine. Maximum dose in 24 hours is 1 tablet (75mg). , Disp: 8 tablet, Rfl: 11  lisinopril 10 MG Oral Tab, Take 1 tablet (10 mg total) by mouth nightly., Disp: 90 tablet, Rfl: 3  ATORVASTATIN 20 MG Oral Tab, TAKE 1 TABLET(20 MG) BY MOUTH EVERY NIGHT, Disp: 90 tablet, Rfl: 3  Multiple Vitamins-Iron (MULTI-DAY PLUS IRON) Oral Tab, Take by mouth., Disp: , Rfl:   Meloxicam 15 MG Oral Tab, Take 1 tablet (15 mg total) by mouth daily. , Disp: 30 tablet, Rfl: 0  Melatonin 10 MG Oral Cap, Take by mouth., Disp: , Rfl:   loratadine 10 MG Oral Tab, Take 1 tablet (10 mg total) by mouth daily. , Disp: , Rfl:   Mirabegron ER (MYRBETRIQ) 50 MG Oral Tablet 24 Hr, TAKE 1 TABLET(50 MG) BY MOUTH DAILY, Disp: 30 tablet, Rfl: 11  [] clarithromycin 500 MG Oral Tab, Take 1 tablet (500 mg total) by mouth 2 (two) times daily for 10 days. , Disp: 20 tablet, Rfl: 0  [] metRONIDAZOLE 500 MG Oral Tab, Take 1 tablet (500 mg total) by mouth in the morning and 1 tablet (500 mg total) before bedtime. Do all this for 10 days. , Disp: 20 tablet, Rfl: 0  [] omeprazole 20 MG Oral Capsule Delayed Release, Take 1 capsule (20 mg total) by mouth 2 (two) times daily before meals for 10 days. , Disp: 20 capsule, Rfl: 0      lactated ringers infusion, , Intravenous, Continuous        Allergies:   Marijuana (Cannabis*    PAIN  Penicillin G            NAUSEA AND VOMITING  Lactose                 OTHER (SEE COMMENTS)    Comment:Other    Past Medical History:   Diagnosis Date    Anxiety 2018    Death of a sister    Anxiety state     Calculus of kidney     Carpal tunnel syndrome 2023    Depression     Essential hypertension     High blood pressure     High cholesterol     Hx of motion sickness     riding in cars    Hyperalgia 2020    Hyperlipidemia     Knee pain, bilateral     Migraine headache     Migraines     Osteoarthritis     Sleep apnea     Tremor 2023    possible side-effect of medications    Visual impairment     Readers     Past Surgical History:   Procedure Laterality Date    ANESTHESIA FOR;  PROCEDURES      EAB in      HYSTERECTOMY  2021    PROCEDURE:  Supracervical total abdominal hysterectomy and bilateral salpingo-oophorectomy. INCISIONAL HERNIA REPAIR N/A 2022    Lap to open    OOPHORECTOMY  2021    PROCEDURE:  Supracervical total abdominal hysterectomy and bilateral salpingo-oophorectomy.     OTHER SURGICAL HISTORY  1976    surgery done as a child for bladder issue     TOTAL ABDOMINAL HYSTERECTOMY N/A 3/9/2021    Procedure: supracervical total abdominal hysterectomy, bilateral salpingo, oophorectomy;  Surgeon: Vincenzo Ellington MD;  Location: [facility] MAIN OR     Family History   Problem Relation Age of Onset    Heart Disorder Father     Heart Disorder Mother         Cardio Aortic aneurysm    Depression Mother         Never diagnosed,     Hypertension Mother     Migraines Mother     Breast Cancer Sister 28    Dementia Maternal Grandmother Also Alzheimer's disease    Dementia Paternal Grandmother         Also Alzheimer's disease    Anxiety Sister     Depression Sister     Migraines Sister     Depression Sister     Migraines Sister     Traumatic brain injury Sister      Social History    Tobacco Use      Smoking status: Former        Packs/day: 0.50        Years: 13.00        Additional pack years: 0.00        Total pack years: 6.50        Types: Cigarettes        Quit date: 2002        Years since quittin.9      Smokeless tobacco: Never      Tobacco comments: It wasn't consistent or daily. Alcohol use: Not Currently        SYSTEM Check if Review is Normal Check if Physical Exam is Normal If not normal, please explain:   EDMOND Mordecai.Retort ] [ Jacquelin Miller  Mordecai.Retort ] [ Ro Maldonado Mordecai.Retort ] [ Ashley Lopez Mordecai.Retort ] [ Roshan Matta Mordecai.Retort ] [ Lacey Andrade Mordecai.Retort ] [ X]    OTHER        I have discussed the risks and benefits and alternatives of the procedure with the patient/family. They understand and agree to proceed with plan of care. I have reviewed the History and Physical done within the last 30 days. Any changes noted above.     Rona Anguiano MD  [facility] - Gastroenterology  2023  9:08 AM

## 2023-09-29 NOTE — ANESTHESIA POSTPROCEDURE EVALUATION
Patient: Nik Richmond    Procedure Summary       Date: 09/29/23 Room / Location: [address] [place] [address] ENDOSCOPY 05 / [address] [place] [address] ENDOSCOPY    Anesthesia Start: 9684 Anesthesia Stop: 4159    Procedure: COLONOSCOPY Diagnosis:       Special screening for malignant neoplasms, colon      (polyp)    Surgeons: Luis Pabon MD Anesthesiologist: Cierra Rojas DO    Anesthesia Type: MAC ASA Status: 2            Anesthesia Type: MAC    Vitals Value Taken Time   /79 09/29/23 1046   Temp  09/29/23 1050   Pulse 77 09/29/23 1049   Resp 20 09/29/23 1049   SpO2 97 % 09/29/23 1049   Vitals shown include unfiled device data.     [address] [place] [address] AN Post Evaluation:   Patient Evaluated in PACU  Patient Participation: complete - patient participated  Level of Consciousness: awake  Pain Management: adequate  Airway Patency:patent  Dental exam unchanged from preop  Yes    Cardiovascular Status: acceptable  Respiratory Status: acceptable  Postoperative Hydration acceptable      TRINIDAD SÁNCHEZ DO  9/29/2023 10:50 AM

## 2023-10-02 ENCOUNTER — APPOINTMENT (OUTPATIENT)
Dept: PHYSICAL THERAPY | Facility: HOSPITAL | Age: 53
End: 2023-10-02
Attending: FAMILY MEDICINE
Payer: COMMERCIAL

## 2023-10-03 NOTE — PROGRESS NOTES
Colon recall 10 years   Dear Della Griffin,    I reviewed the pathology report from the polyp(s) we completely removed during your recent colonoscopy. The polyp removed was a hyperplastic polyp, which is a benign growth and not precancerous. All of your polyps were completely removed. The current health care guidelines recommend that patients with the size, number, and types of polyps you have should repeat their colonoscopy in 10 years to look for any new polyps that might have grown. If you have further questions please call me at [phone] or message me through [phone]     Sincerely,   Perlita Lopez MD

## 2023-10-04 ENCOUNTER — TELEPHONE (OUTPATIENT)
Facility: CLINIC | Age: 53
End: 2023-10-04

## 2023-10-04 ENCOUNTER — OFFICE VISIT (OUTPATIENT)
Dept: PHYSICAL THERAPY | Facility: HOSPITAL | Age: 53
End: 2023-10-04
Attending: FAMILY MEDICINE
Payer: COMMERCIAL

## 2023-10-04 PROCEDURE — 97140 MANUAL THERAPY 1/> REGIONS: CPT

## 2023-10-04 PROCEDURE — 97012 MECHANICAL TRACTION THERAPY: CPT

## 2023-10-04 NOTE — TELEPHONE ENCOUNTER
Patient reviewed results on mychart. Recall Colon in 10 years per  completed on 09/29/23. Health Maintenance updated and message sent to pt outreach to repeat colon screening in 10 years.

## 2023-10-04 NOTE — TELEPHONE ENCOUNTER
----- Message from Austin Gan MD sent at 10/3/2023  3:16 PM CDT -----  Colon recall 10 years   Dear Rina Israel,    I reviewed the pathology report from the polyp(s) we completely removed during your recent colonoscopy. The polyp removed was a hyperplastic polyp, which is a benign growth and not precancerous. All of your polyps were completely removed. The current health care guidelines recommend that patients with the size, number, and types of polyps you have should repeat their colonoscopy in 10 years to look for any new polyps that might have grown. If you have further questions please call me at [phone] or message me through [phone]     Sincerely,   Bryn Curiel MD

## 2023-10-04 NOTE — PROGRESS NOTES
Referring Physician: Cb Weber DO     Date of Onset:  chronic  Date of Eval:    Diagnosis: Degeneration of cervical intervertebral disc (M50.30)  Cervicogenic headache (G44.86)  Insurance: [employer] [place]PO  Precautions:  Sensitive to massage  Neck Disability Index Score  Pre rx: Neck Disability Index Score  Score: 32 % (6/1/2023  9:32 AM)    Subjective: Patient reports after last session migraine not elevated. Some possible benefit from Trial traction mechanical at last session.  Patient also notes that overall Migraine intensity and duration has been improved since introduction of self injection of Egality              Date 6/5/23 6/13/23 6/27/23 8/3/23 8/8/23 9/20/23 9/25/23 9/27/23 10/4/23               Pain Range             Pain current   5/10 HA, shoulder tightness  Decreased post rx 5/10  5/10  pre headache at 4/10 post  neck pre 3/10 ,  2/10 post 4.5/10 headache   Neck pre-rx 4.5/10  Right shoulder/upper trap stiffness, small on  2.5/10 headache    Neck: and shoulder area 5/10, 7/10 headache 4-7/10  Neck and shoulder    4/10 headache pain post 1.5/10 migraine pre and post 5/10 pre migraine    4/10 post rx pain at migraine     Neck and shoulder 3/10 pre,     mild increased soreness at site of traction contact post       Migraine symptoms resent with aura and increased sensitivity to light more present today    Neck and shoulder 4/10       Objective:   Observation/Posture:fwd head flexed CT junction, protraction scapular      initial initial 9/20/23 9/20/23 9/25/23 9/25/23 9/27/23 9/27/23 10/4/23 10/4/23   Cervical AROM: deg Pain (+/-) deg pn deg pn deg pn deg pn   Flexion 55 -                   57                -                NT        Extension 62 dizziness                   63 dizziness                 NT        R Sidebend 42 - stretch sl soreness back of neck 32    Stretch on left upper tra                38     Stretch on left UT  36 deg  post rx  Stretch on left UT     L Sidebend 42 -stretch 38 Stretch on right side                42   42 deg  post rx   Stretch  on right UT     R Rotation 62 Pn right cervical 3.5/10 60 Pn upper trap and cervical 5/10 increases tightness in migraine              80              Stretch right upper trap and slight pn   75  pre stretch   L Rotation 58 No pain just stretch 58  Mostly stretch                64 Stretch no significant pn   67 pre stretch   Protrusion WNL             Retraction WNL                      Shoulder AROM: deg     initial Initial 8/3/23 8/3/23 9/20/23 9/20/23     R    L R L R L    Flex 170 sl pn 163        170 slight UT and inferior cuff pn 160 stretch   170 good stretch 163   Abd 168 tight 170 pn   160 abd tight   170 pn 170 tight 170 tight   ER WNL WNL    WNL WNL WNL WNL   IR WNL WNL   WNL WNL WNL WNL   Strength UE:   -/5 BUE to be determined                 Flexibility: WNL, min, mod severe flexibility limitations   initial initial     R L   Upper trap minimally restricted moderately restricted   Levator scap minimally restricted moderately restricted   Scalenes minimally restricted moderately restricted   Pec Major moderately restricted moderately restricted   Pec Minor moderately restricted moderately restricted   Lats minimally restricted minimally restricted      Palpation: elevated ms tone at both upper traps, both levator scaps, suboccipitals, cervical p/s  Upper Limb Tension Tests: TBD  Neuro Screen: intact sensation,         Goals: 8-12 sessions   Pt will be independent and compliant with comprehensive HEP to maintain progress achieved in PT to reduce ms tone, increase postural support ms for reduction of scap protraction  Patient will demonstrate a reduction in max Headache and neck area pain at cervical region from  max of  10/10 down to 4-5/10 (50%)  Pt will have improved thoracic PA mobility to WNL to improve cervical ROM as well as promote upright posturing and decreased pain with cervical AROM   Pt will have improved thoracic PA mobility to WNL to improve cervical ROM as well as promote upright posturing and decreased pain with cervical AROM   Patient with independent UE extremity, scapular stabilitzation ms and cervical strengthening ex to improved ms length and improve pectoral  flexibility  to decrease strain on cervical spine        Assessment: Patient with report migraine symptoms overall different in intensity and duration. Last session with trial mechanical cervical traction with some decreased pain overall at neck. Pt with good ability to perform home type traction device. Plan: Continue PT per original plan for therapeutic exercises, posture retraining, therapeutic activities, manual treatment, neuromuscular reeducation, therapeutic pain neuroscience education, patient education, modalities as needed. Assess response and carry overall tone reduction. DB=Diaphragmatic breathing, LTR=lower trunk rotation, SKTC=Single knee to chest, DKTC=double knee to chest, BFB=pressure biofeedback training, PPT= posterior pelvic tilt, TFL=Tensor fascia maria eugenia, RF=rectus femoris, H/L=hooklying  Position Therap.  Exercise HEP 6/5/23  2/12 6/13/23  3/12 6/27/23  4/12 8/3/23  5/12 8/8/23  6/12 9/20/23  7/12 9/25/23  8/12 9/27/23  9/12 10/4/23  10/12    Reassessment, cervical and shoulder bilateral AROM, posture,      X  X  X cervical rotation and lateral flexion X lateral flexion only X bilat rotation   H/L  Contract relax Upper traps post         X 3 x 5 secs left and right    H/L Onto 1/2 foam roll along spine  Y' and T's stretches      X 1 x 30 secs each       supine PROM cervical rotation and AAROM with correct axis of rotation       X  x  X   seated AROM shoulder flex/abd, cervical AROM all major motions       X       seated Lateral neck flexion stretch B 3 x 15 secs with DB H X X   X  X  X  X brief post X    seated Diaphragmatic breathing ex (DB) H,  X 10 x X 10x   X        Seated Cervical retractions 10x 3 secs H X 10x X 10x  X 5 x  5 secs     X 3 x 5 secs   Seated  Scapular retractions  H X  5x X 5X  X 5x 3 secs        Seated  Shoulder rolls and DB combined  X 10x  X 10x           Seated  OH pulleys flex and abd   X 2-3 mins          standing Posture training with mirror feedback use of two mirrors for cervical retractions and scapular retractions   X 5 x each          Seated  Levator scap stretches H            Seated  Passive pect major and minor assisted stretches by therapist sustained    3 mins 3  min 30 secs         Manual rx             Supine OA release   2-3 mins 2-3 mins 3 min 3 mins   x x x x   Seated with head on plinth STM to both C-p/s, and both UT and LS, and sub-occipitals  moderate pressure      X  x x x x   supine Deep pressure to left and right Lev scap       X  x x x   Supine  Deep pressure to left and right UT       X  x x x   supine MWM for lateral flexion and Rotation with grade 2-3 SNAG C3-6        X  x   Supine  Light Sustained pressure with DB at both upper traps to tolerance and to both Levator scap  5 mins 5-6 min 5-6 mins 5-6 mins   X deep pressure  x    supine Sustained pressure with DB to sub 0ccipitals  X  X  X   x X x x   Supine  Light STM to right >left cervical p/s  X     X   X     supine More light to moderate STM to both cervical p/s   X  X  X  X      supine Intermittent manual cervical traction    10 x 10 secs   X seated manual X supine       intermittent C-traction              Intermittent C-traction supine  # max pull,  15 secs on 15 secs off 10 x cycle      For next session held  10 x 30 secs holds@ 15 # 10 x 30 secs holds 15# approx 15 secs rest                               H = HEP. Pt given copies of all exercise for home program.  X = exercise completed as described previously  Access Code: Oren Estrada: [email or web address]  Date: 06/01/2023  Prepared by: Marisol      Exercises  - Seated Scapular Retraction  - 3 x daily - 7 x weekly - 1 sets - 5 reps - 2 s hold  - Seated Passive Cervical Retraction  - 3 x daily - 7 x weekly - 1 sets - 5 reps - 2 secs hold  - Seated Cervical Sidebending AROM  - 3 x daily - 7 x weekly - 1 sets - 5 reps - 15 s hold  - Seated Cervical Sidebending AROM (Mirrored)  - 3 x daily - 7 x weekly - 1 sets - 5 reps - 15 s hold    Charged Units Units Minutes   Neuro     Ther ex 0 5   Gait     Man Tx 2 25   US     Mechanical traction 1 15   Total Timed 3 45   Total Tx Time  45      Access Code: [email or web address]  URL: [employer]/  Date: 08/04/2023  Prepared by: Jean Rubio    Exercises  - Side Stepping  - 1 x daily - 2-3 x weekly - 1-2 lengths lengths  - Forward Walking  - 1 x daily - 2-3 x weekly  - Backward Walking  - 1 x daily - 2-3 x weekly - 3 sets - 10 reps  - Standing Hip Circles at [employer]  - 1 x daily - 2-3 x weekly - 3 sets - 10 reps  - Standing March at [employer]  - 1 x daily - 2-3 x weekly - 3 sets - 10 reps  - Heel Toe Raises at Pool Wall  - 1 x daily - 2-3 x weekly - 3 sets - 10 reps  - Standing Hip Abduction Adduction at Pool Wall  - 1 x daily - 2-3 x weekly - 3 sets - 10 reps  - Flutter Kick on Back with Noodle  - 1 x daily - 2-3 x weekly - 2-4  lengths

## 2023-10-10 ENCOUNTER — APPOINTMENT (OUTPATIENT)
Dept: PHYSICAL THERAPY | Facility: HOSPITAL | Age: 53
End: 2023-10-10
Attending: FAMILY MEDICINE
Payer: COMMERCIAL

## 2023-10-10 ENCOUNTER — TELEPHONE (OUTPATIENT)
Dept: PHYSICAL THERAPY | Age: 53
End: 2023-10-10

## 2023-10-12 ENCOUNTER — OFFICE VISIT (OUTPATIENT)
Dept: PHYSICAL THERAPY | Facility: HOSPITAL | Age: 53
End: 2023-10-12
Attending: FAMILY MEDICINE
Payer: COMMERCIAL

## 2023-10-12 PROCEDURE — 97012 MECHANICAL TRACTION THERAPY: CPT

## 2023-10-12 PROCEDURE — 97140 MANUAL THERAPY 1/> REGIONS: CPT

## 2023-10-12 PROCEDURE — 97110 THERAPEUTIC EXERCISES: CPT

## 2023-10-12 NOTE — PROGRESS NOTES
Referring Physician: Hai Warren DO     Date of Onset:  chronic  Date of Eval:    Diagnosis: Degeneration of cervical intervertebral disc (M50.30)  Cervicogenic headache (G44.86)  Insurance: [employer] [place] PPO  Precautions:  Sensitive to massage  Neck Disability Index Score  Pre rx: Neck Disability Index Score  Score: 32 % (6/1/2023  9:32 AM)    Subjective: Patient reports after last session migraine some reduciton. Some possible benefit from Trial traction mechanical at last session.  Patient also notes that overall Migraine intensity and duration has been improved since introduction of self injection of Egality and also Intermittent cervical traction               Date 6/5/23 6/13/23 6/27/23 8/3/23 8/8/23 9/20/23 9/25/23 9/27/23 10/4/23 10/12/23                Pain Range              Pain current   5/10 HA, shoulder tightness  Decreased post rx 5/10  5/10  pre headache at 4/10 post  neck pre 3/10 ,  2/10 post 4.5/10 headache   Neck pre-rx 4.5/10  Right shoulder/upper trap stiffness, small on  2.5/10 headache    Neck: and shoulder area 5/10, 7/10 headache 4-7/10  Neck and shoulder    4/10 headache pain post 1.5/10 migraine pre and post 5/10 pre migraine    4/10 post rx pain at migraine     Neck and shoulder 3/10 pre,     mild increased soreness at site of traction contact post       Migraine symptoms resent with aura and increased sensitivity to light more present today    Neck and shoulder 4/10 3/10 neck and 2/10 in HA    Yesterday 9/10 but once weather front came thru pain was reduced       Objective:   Observation/Posture:fwd head flexed CT junction, protraction scapular      initial initial 9/20/23 9/20/23 9/25/23 9/25/23 9/27/23 9/27/23 10/4/23 10/4/23 10/12/23 10/12/23   Cervical AROM: deg Pain (+/-) deg pn deg pn deg pn deg pn     Flexion 55 -                   57                -                NT          Extension 62 dizziness                   63 dizziness                 NT          R Sidebend 42 - stretch sl soreness back of neck 32    Stretch on left upper tra                38     Stretch on left UT  36 deg  post rx  Stretch on left UT       L Sidebend 42 -stretch 38 Stretch on right side                42   42 deg  post rx   Stretch  on right UT       R Rotation 62 Pn right cervical 3.5/10 60 Pn upper trap and cervical 5/10 increases tightness in migraine              80              Stretch right upper trap and slight pn   75  pre stretch     L Rotation 58 No pain just stretch 58  Mostly stretch                64 Stretch no significant pn   67 pre stretch     Protrusion WNL               Retraction WNL                        Shoulder AROM: deg     initial Initial 8/3/23 8/3/23 9/20/23 9/20/23     R    L R L R L    Flex 170 sl pn 163        170 slight UT and inferior cuff pn 160 stretch   170 good stretch 163   Abd 168 tight 170 pn   160 abd tight   170 pn 170 tight 170 tight   ER WNL WNL    WNL WNL WNL WNL   IR WNL WNL   WNL WNL WNL WNL   Strength UE:   -/5 BUE to be determined                 Flexibility: WNL, min, mod severe flexibility limitations   initial initial     R L   Upper trap minimally restricted moderately restricted   Levator scap minimally restricted moderately restricted   Scalenes minimally restricted moderately restricted   Pec Major moderately restricted moderately restricted   Pec Minor moderately restricted moderately restricted   Lats minimally restricted minimally restricted      Palpation: elevated ms tone at both upper traps, both levator scaps, suboccipitals, cervical p/s  Upper Limb Tension Tests: TBD  Neuro Screen: intact sensation,         Goals: 8-12 sessions   Pt will be independent and compliant with comprehensive HEP to maintain progress achieved in PT to reduce ms tone, increase postural support ms for reduction of scap protraction  Patient will demonstrate a reduction in max Headache and neck area pain at cervical region from  max of  10/10 down to 4-5/10 (50%)  Pt will have improved thoracic PA mobility to WNL to improve cervical ROM as well as promote upright posturing and decreased pain with cervical AROM   Pt will have improved thoracic PA mobility to WNL to improve cervical ROM as well as promote upright posturing and decreased pain with cervical AROM   Patient with independent UE extremity, scapular stabilitzation ms and cervical strengthening ex to improved ms length and improve pectoral  flexibility  to decrease strain on cervical spine        Assessment: Patient with report migraine symptoms were elevated yesterday pre-storm front coming thru, 9/10. Pain much lower for neck and migraine today. Has been benefiting post cervical traction but did have soreness at area of contact at cervical spine. Patient open to possible home traction use in future. Plan: Continue PT for adjustment of comfort for neck traction and reassessment overall ROM. DB=Diaphragmatic breathing, LTR=lower trunk rotation, SKTC=Single knee to chest, DKTC=double knee to chest, BFB=pressure biofeedback training, PPT= posterior pelvic tilt, TFL=Tensor fascia maria eugenia, RF=rectus femoris, H/L=hooklying  Position Therap.  Exercise HEP 6/5/23  2/12 6/13/23  3/12 6/27/23  4/12 8/3/23  5/12 8/8/23  6/12 9/20/23  7/12 9/25/23  8/12 9/27/23  9/12 10/4/23  10/12 10/12/23  11/12    Reassessment, cervical and shoulder bilateral AROM, posture,      X  X  X cervical rotation and lateral flexion X lateral flexion only X bilat rotation    H/L  Contract relax Upper traps post         X 3 x 5 secs left and right  X 3 x 5 secs holds left and right each   H/L Onto 1/2 foam roll along spine  Y' and T's stretches      X 1 x 30 secs each        supine PROM cervical rotation and AAROM with correct axis of rotation       X  x  X    seated AROM shoulder flex/abd, cervical AROM all major motions       X        seated Lateral neck flexion stretch B 3 x 15 secs with DB H X X   X  X  X  X brief post X  x   seated Diaphragmatic breathing ex (DB) H,  X 10 x X 10x   X         Seated Cervical retractions 10x 3 secs H X 10x X 10x  X 5 x  5 secs     X 3 x 5 secs    Seated  Scapular retractions  H X  5x X 5X  X 5x 3 secs         Seated  Shoulder rolls and DB combined  X 10x  X 10x            Seated  OH pulleys flex and abd   X 2-3 mins           standing Posture training with mirror feedback use of two mirrors for cervical retractions and scapular retractions   X 5 x each           Seated  Levator scap stretches H          X 3 x 15 secs   Seated  Passive pect major and minor assisted stretches by therapist sustained    3 mins 3  min 30 secs          Manual rx              Supine OA release   2-3 mins 2-3 mins 3 min 3 mins   x x x x x   Seated with head on plinth STM to both C-p/s, and both UT and LS, and sub-occipitals  moderate pressure      X  x x x x X   supine Deep pressure to left and right Lev scap       X  x x x X   Supine  Deep pressure to left and right UT       X  x x x X   supine MWM for lateral flexion and Rotation with grade 2-3 SNAG C3-6        X  x X   Supine  Light Sustained pressure with DB at both upper traps to tolerance and to both Levator scap  5 mins 5-6 min 5-6 mins 5-6 mins   X deep pressure  x  X   supine Sustained pressure with DB to sub 0ccipitals  X  X  X   x X x x X   Supine  Light STM to right >left cervical p/s  X     X   X      supine More light to moderate STM to both cervical p/s   X  X  X  X       supine Intermittent manual cervical traction    10 x 10 secs   X seated manual X supine        intermittent C-traction               Intermittent C-traction supine  # max pull,  15 secs on 15 secs off 10 x cycle      For next session held  10 x 30 secs holds@ 15 # 10 x 30 secs holds 15# approx 15 secs rest X 10x 20 secs holds 15# and 15 secs                                 H = HEP.  Pt given copies of all exercise for home program.  X = exercise completed as described previously  Access Code: Jose Chol: [email or web address]  Date: 06/01/2023  Prepared by: Kris Vitale     Exercises  - Seated Scapular Retraction  - 3 x daily - 7 x weekly - 1 sets - 5 reps - 2 s hold  - Seated Passive Cervical Retraction  - 3 x daily - 7 x weekly - 1 sets - 5 reps - 2 secs hold  - Seated Cervical Sidebending AROM  - 3 x daily - 7 x weekly - 1 sets - 5 reps - 15 s hold  - Seated Cervical Sidebending AROM (Mirrored)  - 3 x daily - 7 x weekly - 1 sets - 5 reps - 15 s hold    Charged Units Units Minutes   Neuro     Ther ex 1 12   Gait     Man Tx 1 20   US     Mechanical traction 1 15   Total Timed 3 47   Total Tx Time  47      Access Code: [email or web address]  URL: [email or web address]  Date: 08/04/2023  Prepared by: Kris Vitale    Exercises  - Side Stepping  - 1 x daily - 2-3 x weekly - 1-2 lengths lengths  - Forward Walking  - 1 x daily - 2-3 x weekly  - Backward Walking  - 1 x daily - 2-3 x weekly - 3 sets - 10 reps  - Standing Hip Circles at [employer]  - 1 x daily - 2-3 x weekly - 3 sets - 10 reps  - Standing March at [employer]  - 1 x daily - 2-3 x weekly - 3 sets - 10 reps  - Heel Toe Raises at Pool Wall  - 1 x daily - 2-3 x weekly - 3 sets - 10 reps  - Standing Hip Abduction Adduction at Pool Wall  - 1 x daily - 2-3 x weekly - 3 sets - 10 reps  - Flutter Kick on Back with Noodle  - 1 x daily - 2-3 x weekly - 2-4  lengths

## 2023-10-24 ENCOUNTER — TELEPHONE (OUTPATIENT)
Dept: PHYSICAL THERAPY | Facility: HOSPITAL | Age: 53
End: 2023-10-24

## 2023-10-27 ENCOUNTER — TELEPHONE (OUTPATIENT)
Dept: NEUROLOGY | Facility: CLINIC | Age: 53
End: 2023-10-27

## 2023-10-27 NOTE — TELEPHONE ENCOUNTER
PA requested & sent to [facility] through [employer].  WIll await determination      Approved until Oct 26, 2024

## 2023-11-09 ENCOUNTER — TELEPHONE (OUTPATIENT)
Dept: PHYSICAL THERAPY | Age: 53
End: 2023-11-09

## 2023-11-09 ENCOUNTER — APPOINTMENT (OUTPATIENT)
Dept: PHYSICAL THERAPY | Facility: HOSPITAL | Age: 53
End: 2023-11-09
Attending: FAMILY MEDICINE
Payer: COMMERCIAL

## 2023-11-16 ENCOUNTER — OFFICE VISIT (OUTPATIENT)
Dept: PHYSICAL THERAPY | Facility: HOSPITAL | Age: 53
End: 2023-11-16
Attending: FAMILY MEDICINE
Payer: COMMERCIAL

## 2023-11-16 PROCEDURE — 97012 MECHANICAL TRACTION THERAPY: CPT

## 2023-11-16 PROCEDURE — 97110 THERAPEUTIC EXERCISES: CPT

## 2023-11-16 NOTE — PROGRESS NOTES
Referring Physician: [facility]  Date of Onset:  chronic  Date of Eval:    Diagnosis: Degeneration of cervical intervertebral disc (M50.30)  Cervicogenic headache (G44.86)  Insurance: [facility] PPO  Precautions:  Sensitive to massage  Neck Disability Index Score  Pre rx: Neck Disability Index Score  Score: 32 % (6/1/2023  9:32 AM)    Subjective: Patient reports after last session migraine some reduciton. Some possible benefit from Trial traction mechanical at last session. Patient also notes that overall Migraine intensity and duration has been improved since introduction of self injection of Egality and also Intermittent cervical traction      Headaches 1.5-6/10 after getting egality shots,    1st of month self injection.                Date 6/27/23 8/3/23 8/8/23 9/20/23 9/25/23 9/27/23 10/4/23 10/12/23 11/16/23               Pain Range         1.5/-6/10 range   Pain current 5/10  pre headache at 4/10 post  neck pre 3/10 ,  2/10 post 4.5/10 headache   Neck pre-rx 4.5/10  Right shoulder/upper trap stiffness, small on  2.5/10 headache    Neck: and shoulder area 5/10, 7/10 headache 4-7/10  Neck and shoulder    4/10 headache pain post 1.5/10 migraine pre and post 5/10 pre migraine    4/10 post rx pain at migraine     Neck and shoulder 3/10 pre,     mild increased soreness at site of traction contact post       Migraine symptoms resent with aura and increased sensitivity to light more present today    Neck and shoulder 4/10 3/10 neck and 2/10 in HA    Yesterday 9/10 but once weather front came thru pain was reduced 1.5/10 Headache with shot in thigh has helped knee stiffness       Objective:   Observation/Posture:fwd head flexed CT junction, protraction scapular      initial initial 9/25/23 9/25/23 9/27/23 9/27/23 10/4/23 10/4/23 10/12/23 10/12/23 11/16/23 11/16/23   Cervical AROM: deg Pain (+/-) deg pn deg pn deg pn   deg pn   Flexion 55 -                NT        55   -   Extension 62 dizziness NT        65  -    R Sidebend 42 - stretch sl soreness back of neck                38     Stretch on left UT  36 deg  post rx  Stretch on left UT     38  - stretch   L Sidebend 42 -stretch                42   42 deg  post rx   Stretch  on right UT     42 Right neck stretch   R Rotation 62 Pn right cervical 3.5/10              80              Stretch right upper trap and slight pn   75  pre stretch   78    L Rotation 58 No pain just stretch               64 Stretch no significant pn   67 pre stretch   75 Stretch in neck    Protrusion WNL           WNL    Retraction WNL           WNL             Shoulder AROM: deg     initial Initial 8/3/23 8/3/23 9/20/23 9/20/23 11/16/23 11/16/23     R    L R L R L  R L   Flex 170 sl pn 163        170 slight UT and inferior cuff pn 160 stretch   170 good stretch 163 185 178   Abd 168 tight 170 pn   160 abd tight   170 pn 170 tight 170 tight 170 stretch 178 stretch   ER WNL WNL    WNL WNL WNL WNL WNL  WNL   IR WNL WNL   WNL WNL WNL WNL WNL WNL   Strength UE:   -/5 BUE to be determined                 Flexibility: WNL, min, mod severe flexibility limitations   initial initial     R L   Upper trap minimally restricted moderately restricted   Levator scap minimally restricted moderately restricted   Scalenes minimally restricted moderately restricted   Pec Major moderately restricted moderately restricted   Pec Minor moderately restricted moderately restricted   Lats minimally restricted minimally restricted      Palpation: elevated ms tone at both upper traps, both levator scaps, suboccipitals, cervical p/s  Upper Limb Tension Tests: TBD  Neuro Screen: intact sensation,         Goals: 8-12 sessions   Pt will be independent and compliant with comprehensive HEP to maintain progress achieved in PT to reduce ms tone, increase postural support ms for reduction of scap protraction  Patient will demonstrate a reduction in max Headache and neck area pain at cervical region from  max of  10/10 down to 4-5/10 (50%)  Pt will have improved thoracic PA mobility to WNL to improve cervical ROM as well as promote upright posturing and decreased pain with cervical AROM   Pt will have improved thoracic PA mobility to WNL to improve cervical ROM as well as promote upright posturing and decreased pain with cervical AROM   Patient with independent UE extremity, scapular stabilitzation ms and cervical strengthening ex to improved ms length and improve pectoral  flexibility  to decrease strain on cervical spine        Assessment: Patient with improved migraine symptoms. Pain much lower for neck and migraine today. Has been benefiting from stretching and pool exercises and self administered Emgality medication for headaches and overall neck symptoms. Did feel home simulation of cervical traction with good benefit but does not desire to purchase home unit at this time due to overall improvement without use of traction at home. No soreness at area of contact at cervical spine of Star tract traction unit post treatment today. Plan: Continue PT for tone reduction, and cervical strengthening for home ex. DB=Diaphragmatic breathing, LTR=lower trunk rotation, SKTC=Single knee to chest, DKTC=double knee to chest, BFB=pressure biofeedback training, PPT= posterior pelvic tilt, TFL=Tensor fascia maria eugenia, RF=rectus femoris, H/L=hooklying  Position Therap.  Exercise HEP 8/8/23  6/12 9/20/23  7/12 9/25/23  8/12 9/27/23  9/12 10/4/23  10/12 10/12/23  11/12 11/16/23  12/12    Reassessment, cervical and shoulder bilateral AROM, posture,    X  X cervical rotation and lateral flexion X lateral flexion only X bilat rotation  X cervical shoulder bilateral   H/L  Contract relax Upper traps post     X 3 x 5 secs left and right  X 3 x 5 secs holds left and right each     Cervical isometrics for Ext, lateral flexion bilat 5 x 5 secs                       H/L Onto 1/2 foam roll along spine  Y' and T's stretches  X 1 x 30 secs each X    supine PROM cervical rotation and AAROM with correct axis of rotation   X  x  X     seated AROM shoulder flex/abd, cervical AROM all major motions   X         seated Lateral neck flexion stretch B 3 x 15 secs with DB H  X  X  X brief post X  x X    seated Diaphragmatic breathing ex (DB) H,           Seated Cervical retractions 10x 3 secs H     X 3 x 5 secs  X 3 x 5 secs   Seated  Scapular retractions  H          Seated  Shoulder rolls and DB combined           Seated  OH pulleys flex and abd           standing Posture training with mirror feedback use of two mirrors for cervical retractions and scapular retractions           Seated  Levator scap stretches H      X 3 x 15 secs    Seated  Passive pect major and minor assisted stretches by therapist sustained  30 secs       X 1 mins    Manual rx           Supine OA release     x x x x x    Seated with head on plinth STM to both C-p/s, and both UT and LS, and sub-occipitals  moderate pressure  X  x x x x X    supine Deep pressure to left and right Lev scap   X  x x x X    Supine  Deep pressure to left and right UT   X  x x x X    supine MWM for lateral flexion and Rotation with grade 2-3 SNAG C3-6    X  x X    Supine  Light Sustained pressure with DB at both upper traps to tolerance and to both Levator scap    X deep pressure  x  X    supine Sustained pressure with DB to sub 0ccipitals   x X x x X    Supine  Light STM to right >left cervical p/s  X   X       supine More light to moderate STM to both cervical p/s  X  X        supine Intermittent manual cervical traction  X seated manual X supine         intermittent C-traction            Intermittent C-traction supine  # max pull,  15 secs on 15 secs off 10 x cycle  For next session held  10 x 30 secs holds@ 15 # 10 x 30 secs holds 15# approx 15 secs rest X 10x 20 secs holds 15# and 15 secs X 10x 20 secs holds 15# and 15 secs                           H = HEP.  Pt given copies of all exercise for home program.  X = exercise completed as described previously  Access Code: Debra Thompson: [email or web address]  Date: 06/01/2023  Prepared by: Tim Coates     Exercises  - Seated Scapular Retraction  - 3 x daily - 7 x weekly - 1 sets - 5 reps - 2 s hold  - Seated Passive Cervical Retraction  - 3 x daily - 7 x weekly - 1 sets - 5 reps - 2 secs hold  - Seated Cervical Sidebending AROM  - 3 x daily - 7 x weekly - 1 sets - 5 reps - 15 s hold  - Seated Cervical Sidebending AROM (Mirrored)  - 3 x daily - 7 x weekly - 1 sets - 5 reps - 15 s hold    Charged Units Units Minutes   Neuro     Ther ex 2 28   Gait     Man Tx     US     Mechanical traction 1 15   Total Timed 3 43   Total Tx Time  43      Access Code: [email or web address]  URL: [email or web address]  Date: 08/04/2023  Prepared by: Tim Coates    Exercises  - Side Stepping  - 1 x daily - 2-3 x weekly - 1-2 lengths lengths  - Forward Walking  - 1 x daily - 2-3 x weekly  - Backward Walking  - 1 x daily - 2-3 x weekly - 3 sets - 10 reps  - Standing Hip Circles at [employer]  - 1 x daily - 2-3 x weekly - 3 sets - 10 reps  - Standing March at [employer]  - 1 x daily - 2-3 x weekly - 3 sets - 10 reps  - Heel Toe Raises at Pool Wall  - 1 x daily - 2-3 x weekly - 3 sets - 10 reps  - Standing Hip Abduction Adduction at Pool Wall  - 1 x daily - 2-3 x weekly - 3 sets - 10 reps  - Flutter Kick on Back with Noodle  - 1 x daily - 2-3 x weekly - 2-4  lengths

## 2023-12-01 RX ORDER — TRAZODONE HYDROCHLORIDE 50 MG/1
TABLET ORAL
Qty: 30 TABLET | Refills: 0 | Status: SHIPPED | OUTPATIENT
Start: 2023-12-01

## 2023-12-01 NOTE — TELEPHONE ENCOUNTER
Please review; protocol failed.     Requested Prescriptions   Pending Prescriptions Disp Refills    TRAZODONE 50 MG Oral Tab [Pharmacy Med Name: TRAZODONE 50MG TABLETS] 30 tablet 0     Sig: TAKE 3/4 TABLET(37.5 MG) BY MOUTH EVERY NIGHT AS NEEDED FOR SLEEP       There is no refill protocol information for this order        Future Appointments         Provider Department Appt Notes    In 4 days Nita Van MD [facility], [address], [place] [facility]    In 1 week Angelia Kovacs, [address] (neck) [employer] PPO  c/p $25, no auth, no limit    In 2 months Teodoro Osgood, MD [facility] [address] [facility] 6- MONTH F/U Doris Whitaker  [phone]          Recent Outpatient Visits              2 weeks ago     [address], [address]    Office Visit    1 month ago     [address], [address]    Office Visit    1 month ago     [facility] [address], [address]    Office Visit    2 months ago     [address], [address] [place] Visit    2 months ago     [address], [address]    Office Visit

## 2023-12-05 ENCOUNTER — OFFICE VISIT (OUTPATIENT)
Dept: GASTROENTEROLOGY | Facility: CLINIC | Age: 53
End: 2023-12-05

## 2023-12-05 ENCOUNTER — TELEPHONE (OUTPATIENT)
Dept: GASTROENTEROLOGY | Facility: CLINIC | Age: 53
End: 2023-12-05

## 2023-12-05 VITALS
SYSTOLIC BLOOD PRESSURE: 126 MMHG | HEART RATE: 96 BPM | BODY MASS INDEX: 44.59 KG/M2 | DIASTOLIC BLOOD PRESSURE: 84 MMHG | HEIGHT: 63 IN | WEIGHT: 251.63 LBS

## 2023-12-05 DIAGNOSIS — K64.9 HEMORRHOIDS, UNSPECIFIED HEMORRHOID TYPE: ICD-10-CM

## 2023-12-05 DIAGNOSIS — K21.9 GASTROESOPHAGEAL REFLUX DISEASE, UNSPECIFIED WHETHER ESOPHAGITIS PRESENT: Primary | ICD-10-CM

## 2023-12-05 DIAGNOSIS — K21.9 GASTROESOPHAGEAL REFLUX DISEASE, UNSPECIFIED WHETHER ESOPHAGITIS PRESENT: ICD-10-CM

## 2023-12-05 DIAGNOSIS — R11.2 NAUSEA AND VOMITING, UNSPECIFIED VOMITING TYPE: ICD-10-CM

## 2023-12-05 DIAGNOSIS — R19.5 LOOSE STOOLS: ICD-10-CM

## 2023-12-05 DIAGNOSIS — R19.4 CHANGE IN BOWEL HABITS: Primary | ICD-10-CM

## 2023-12-05 PROCEDURE — 3008F BODY MASS INDEX DOCD: CPT | Performed by: INTERNAL MEDICINE

## 2023-12-05 PROCEDURE — 3079F DIAST BP 80-89 MM HG: CPT | Performed by: INTERNAL MEDICINE

## 2023-12-05 PROCEDURE — 99244 OFF/OP CNSLTJ NEW/EST MOD 40: CPT | Performed by: INTERNAL MEDICINE

## 2023-12-05 PROCEDURE — 3074F SYST BP LT 130 MM HG: CPT | Performed by: INTERNAL MEDICINE

## 2023-12-05 RX ORDER — OMEPRAZOLE 40 MG/1
40 CAPSULE, DELAYED RELEASE ORAL
Qty: 90 CAPSULE | Refills: 1 | Status: SHIPPED | OUTPATIENT
Start: 2023-12-05

## 2023-12-05 RX ORDER — HYDROCORTISONE 25 MG/G
CREAM TOPICAL
Qty: 1 EACH | Refills: 0 | Status: SHIPPED | OUTPATIENT
Start: 2023-12-05

## 2023-12-05 NOTE — H&P
[facility], [facility] - Gastroenterology                                                                                                               Reason for consult: acid reflux, nausea with vomiting, abdominal pain     Requesting physician or provider: Lawrence Hancock MD    Chief Complaint   Patient presents with    Gastro-esophageal Reflux     Nausea, loose stool ,h/o h-pylori infection, bilateral flank lower quadrant pain       HPI:   Sayra Boateng is a 48year old year-old female (identifies as they/them) with history of anxiety, depression, nephrolithiasis, HTN, HLD, migraines, OA, and SADIE presenting for evaluation of multiple GI complaints. They complain of abdominal pain to the bilateral abdomen, L > R, nausea with nbnb emesis and acid reflux. They have a history of h pylori that was diagnosed and treated this summer with evidence of eradication. Prior to this, they report loose stools every day and throwing up after meals. They tried to eat smaller meals but that did not seem to make a difference. Emesis would always occur after meals and seems to be worse in the evening. They report following a vegetarian diet. They note both heartburn and reflux sensation. This does not occur daily. Last week they experienced reflux 3 days in a row but have not had reflux this week. They are taking TUMS prn. They are not on a PPI. They endorse usually 3-4 BMs a day, sometimes 5-6 BMs a day. They experienced loose stools 10x over the past month. Every once in a while stool will be hard. They endorse complete evacuation. They note loose stools have been occurring for at least 2 years. They have been on meloxicam for arthritis for 2 years and started venlafaxine about 1 year ago. They endorse hemorrhoids. They report blood in stool that has occurred 3-4x in the past. They deny straining. They endorse anal pruritus.      Prior endoscopies:  Colonoscopy 2023 with diminutive HP polyp, repeat in 10 years     Soc:  -Denies smoking  -Denies significant EtOH  -Denies illicits and THC use     Wt Readings from Last 6 Encounters:   23 251 lb 9.6 oz (114.1 kg)   23 240 lb (108.9 kg)   23 240 lb (108.9 kg)   23 240 lb (108.9 kg)   23 253 lb (114.8 kg)   23 253 lb (114.8 kg)        History, Medications, Allergies, ROS:      Past Medical History:   Diagnosis Date    Anxiety 2018    Death of a sister    Anxiety state     Calculus of kidney     Carpal tunnel syndrome 2023    Depression     Essential hypertension     High blood pressure     High cholesterol     Hx of motion sickness     riding in cars    Hyperalgia 2020    Hyperlipidemia     Knee pain, bilateral     Migraine headache     Migraines     Osteoarthritis     Sleep apnea     Tremor 2023    possible side-effect of medications    Visual impairment     Readers      Past Surgical History:   Procedure Laterality Date    ANESTHESIA FOR;  PROCEDURES      EAB in 's     COLONOSCOPY N/A 2023    Procedure: COLONOSCOPY;  Surgeon: Corinne Reed MD;  Location: [address] [place] [address] ENDOSCOPY    HERNIA SURGERY      HYSTERECTOMY  2021    PROCEDURE:  Supracervical total abdominal hysterectomy and bilateral salpingo-oophorectomy. INCISIONAL HERNIA REPAIR N/A 2022    Lap to open    OOPHORECTOMY  2021    PROCEDURE:  Supracervical total abdominal hysterectomy and bilateral salpingo-oophorectomy.     OTHER SURGICAL HISTORY  1976    surgery done as a child for bladder issue     TOTAL ABDOMINAL HYSTERECTOMY N/A 2021    Procedure: supracervical total abdominal hysterectomy, bilateral salpingo, oophorectomy;  Surgeon: Kimberly Altman MD;  Location: [facility] MAIN OR      Family Hx:   Family History   Problem Relation Age of Onset    Heart Disorder Father     Heart Disorder Mother         Cardio Aortic aneurysm    Depression Mother Never diagnosed,     Hypertension Mother     Migraines Mother     Breast Cancer Sister 28    Dementia Maternal Grandmother         Also Alzheimer's disease    Dementia Paternal Grandmother         Also Alzheimer's disease    Anxiety Sister     Depression Sister     Migraines Sister     Depression Sister     Migraines Sister     Traumatic brain injury Sister       Social History:   Social History     Socioeconomic History    Marital status: Single   Tobacco Use    Smoking status: Former     Packs/day: 0.50     Years: 13.00     Additional pack years: 0.00     Total pack years: 6.50     Types: Cigarettes     Quit date: 2002     Years since quittin.0    Smokeless tobacco: Never    Tobacco comments: It wasn't consistent or daily. Vaping Use    Vaping Use: Never used   Substance and Sexual Activity    Alcohol use: Not Currently     Comment: not since last month in November    Drug use: Not Currently     Types: Cannabis     Comment: Never again, cannabis gives me joint pain. Sexual activity: Not Currently   Other Topics Concern    Blood Transfusions No    Special Diet Yes     Comment: Dash Diet        Medications (Active prior to today's visit):  Current Outpatient Medications   Medication Sig Dispense Refill    hydrocortisone 2.5 % External Cream Apply a pea sized amount to the anus, 2 times every day for 10 days. 1 each 0    Omeprazole 40 MG Oral Capsule Delayed Release Take 1 capsule (40 mg total) by mouth every morning before breakfast. 90 capsule 1    traZODone 50 MG Oral Tab TAKE 3/4 TABLET(37.5 MG) BY MOUTH EVERY NIGHT AS NEEDED FOR SLEEP 30 tablet 0    venlafaxine ER (EFFEXOR XR) 37.5 MG Oral Capsule SR 24 Hr Take 1 cap PO daily with 150mg cap (187.5mg daily) 30 capsule 1    hydrOXYzine 25 MG Oral Tab Take 0.5-1 tablets (12.5-25 mg total) by mouth 3 (three) times daily as needed for Anxiety (or insomnia).  90 tablet 0    venlafaxine  MG Oral Capsule SR 24 Hr Take 1 cap PO daily with 37.5mg cap (187.5mg daily)      Galcanezumab-gnlm (EMGALITY) 120 MG/ML Subcutaneous Solution Auto-injector Inject 120 mg as directed every 30 (thirty) days. 1 mL 5    ondansetron 4 MG Oral Tablet Dispersible Take 1 tablet (4 mg total) by mouth every 8 (eight) hours as needed for Nausea. 15 tablet 0    Rimegepant Sulfate (NURTEC) 75 MG Oral Tablet Dispersible Take 75 mg by mouth as needed. Take one tablet at onset of migraine. Maximum dose in 24 hours is 1 tablet (75mg). 8 tablet 11    lisinopril 10 MG Oral Tab Take 1 tablet (10 mg total) by mouth nightly. 90 tablet 3    ATORVASTATIN 20 MG Oral Tab TAKE 1 TABLET(20 MG) BY MOUTH EVERY NIGHT 90 tablet 3    Multiple Vitamins-Iron (MULTI-DAY PLUS IRON) Oral Tab Take by mouth. Meloxicam 15 MG Oral Tab Take 1 tablet (15 mg total) by mouth daily. 30 tablet 0    Melatonin 10 MG Oral Cap Take by mouth.      loratadine 10 MG Oral Tab Take 1 tablet (10 mg total) by mouth daily. Mirabegron ER (MYRBETRIQ) 50 MG Oral Tablet 24 Hr TAKE 1 TABLET(50 MG) BY MOUTH DAILY (Patient not taking: Reported on 12/5/2023) 30 tablet 11       Allergies: Allergies   Allergen Reactions    Marijuana (Cannabis Sativa) PAIN    Penicillin G NAUSEA AND VOMITING       ROS:   CONSTITUTIONAL:  negative for fevers, rigors  EYES:  negative for diplopia   RESPIRATORY:  negative for severe shortness of breath  CARDIOVASCULAR:  negative for crushing sub-sternal chest pain  GASTROINTESTINAL:  see HPI  GENITOURINARY:  negative for dysuria or gross hematuria  INTEGUMENT/BREAST:  SKIN:  negative for jaundice   ALLERGIC/IMMUNOLOGIC:  negative for hay fever  ENDOCRINE:  negative for cold intolerance and heat intolerance  MUSCULOSKELETAL:  negative for joint effusion/severe erythema  BEHAVIOR/PSYCH:  negative for psychotic behavior    PHYSICAL EXAM:   Blood pressure 126/84, pulse 96, height 5' 3\" (1.6 m), weight 251 lb 9.6 oz (114.1 kg), last menstrual period 03/02/2021.     Gen: appears comfortable and in no acute distress  HEENT: sclera appear anicteric, oropharynx clear, mucus membranes appear moist  CV: the extremities are warm and well-perfused   Lung: no increased work of breathing, no conversational dyspnea   Abd: soft, non-tender exam in all quadrants without rigidity or guarding, non-distended, no masses palpated  Skin: no jaundice, no apparent rashes   Neuro: alert and interactive, no focal neuro deficits  Psych: cooperative, normal affect     Labs/Imaging:     Patient's pertinent labs and imaging were reviewed and discussed with patient today. ASSESSMENT/PLAN:   Gisele Evans is a 48year old year-old female (identifies as they/them) with history of anxiety, depression, nephrolithiasis, HTN, HLD, migraines, OA, and SADIE presenting for evaluation of multiple GI complaints. # Loose Stools   This has been occurring intermittently over the past 2 years. They are on meloxicam and venlafaxine which put them at risk for microscopic colitis. They had a colonoscopy in 10/2023 for CRC screening, biopsies were not obtained at that time. We discussed obtaining a KUB to assess stool burden, checking blood and stool for inflammation, ruling out celiac disease with serologies and assessing stool for EPI. We also discussed trialing a low FODMAP diet. We may need to consider repeat colonoscopy for biopsies. # Reflux  # Heartburn  # Nausea/Vomiting  This may all be acid-related. We will trial a daily PPI for the next 6-8 weeks and assess for improvement. We also discussed lifestyle and dietary modifications. We will plan for EGD to evaluate for GERD changes and for other structural/mucosal changes to cause nausea/vomiting. We could consider a gastric emptying study. # Hemorrhoids  Recent colonoscopy where pt was told they had hemorrhoids. They deferred exam today. They do note intermittent bleeding (3-4x in past year) and pruritus.  Discussed conservative measures for hemorrhoid treatment and using anusol suppositories for 2 weeks. Recommendations:  Get your labs done and submit a stool sample. Get an x-ray of your abdomen. Try a low FODMAP diet for 2 weeks strictly. If you have improvement, add back foods one by one. If you don't feel better, stop this diet.     =====================================================    Lifestyle and dietary modifications were discussed today including but not limited to:     *Elevated head of bed at night      *Refrain from laying down after consuming a meal and do not eat meals 2-3 hours before bedtime. *Avoid tight clothing or belts. *Weight loss as able. *Eliminate dietary triggers (examples: caffeine, chocolate, spicy foods, fried/fatty foods, peppermint and carbonated beverages). *Avoid tobacco and alcohol as both can worsen acid reflux (reduces to pressure of the lower esophageal valve and smoking also reduces saliva production). *Consider oral lozenges or chewing gum throughout the day (promotes saliva to neutralize refluxed acid). *Avoid NSAIDs (ibuprofen, motrin, aleve, naproxen, etc) and aspirin as able. - Start taking omeprazole (prilosec) 40 milligrams once a day. This is best taken 30-60 minutes before breakfast on an empty stomach. - We will plan for EGD to evaluate the severity of GERD from a mucosal standpoint and complications associated if any (discussed esophagitis, strictures, Lott's esophagus). 1. Schedule an upper endoscopy (EGD) with [facility] [Diagnosis: nausea/vomiting, acid reflux]    2. Do not eat or drink after midnight the night before your procedure. 3. Medication adjustments: Continue ALL medications as usual.    4. If you start any NEW medication after your visit today, please notify us. Certain medications will need to be held before the procedure, or the procedure cannot be performed. 5. You will need a ride home from your procedure since you are receiving sedation.  Please ensure you will have an available ride home or the procedure cannot be performed.     =====================================================    1. Use wet towelettes (Kleenix, Tucks, Danish) to clean the anal area after bowel movements and then pat dry the area with dry toilet paper. 2.  DO NOT rub the area with dry toilet paper. Sitz baths can be taken as necessary (30 minutes soaking in a tub of tepid water once or twice a day for perianal burning and/or itching)    3. Apply hydrocortisone cream to the anus twice a day (pea-sized amount) for 7-10 days. Orders This Visit:  Orders Placed This Encounter   Procedures    C-Reactive Protein    Immunoglobulin A, Quant    Tissue Transglutaminase Ab, IgA    Calprotectin, Fecal    Pancreatic Elastase, Fecal       Meds This Visit:  Requested Prescriptions     Signed Prescriptions Disp Refills    hydrocortisone 2.5 % External Cream 1 each 0     Sig: Apply a pea sized amount to the anus, 2 times every day for 10 days.     Omeprazole 40 MG Oral Capsule Delayed Release 90 capsule 1     Sig: Take 1 capsule (40 mg total) by mouth every morning before breakfast.       Imaging & Referrals:  XR ABDOMEN (1 VIEW) (JAH=[id number])       Mary Wren MD  [facility], [facility] Gastroenterology  12/5/2023

## 2023-12-05 NOTE — TELEPHONE ENCOUNTER
Scheduled for: Asael Reyna  Provider Name:  Dr. Cielo Alaniz   Date:  3/21/2024  Location:[facility]  Sedation:  [facility]  Time:11:15 Am   (Patient is aware arrival time is at 10:15 Am)  Prep:  Egd -Do not eat or drink after midnight the night before your procedure. Meds/Allergies Reconciled?:  Physician Reviewed   Diagnosis with codes:  Laneta Shore K21.9 ,Nausea and Vomiting R11.2  Was patient informed to call insurance with codes (Y/N):  Yes  Referral sent?:  Referral was sent at the time of electronic surgical scheduling. [facility] or Carine Bronson notified?:  I sent an electronic request to Endo Scheduling and received a confirmation today. Medication Orders:  Pt is aware to NOT take iron pills, herbal meds and diet supplements for 7 days before exam. Also to NOT take any form of alcohol, recreational drugs and any forms of ED meds 24 hours before exam.   Medication adjustments: Continue ALL medications as usual.   Misc Orders:      Further instructions given by staff:  I provide prep instructions to patient at the time of the appointment and reviewed date, time and location, patient verbalized that he understood and is aware to call if he has any questions. Patient was informed about the new cancellation policy for his/her procedure. Patient was also given a copy of the cancellation policy at the time of the appointment and verbalized understanding.

## 2023-12-07 ENCOUNTER — HOSPITAL ENCOUNTER (OUTPATIENT)
Dept: GENERAL RADIOLOGY | Facility: HOSPITAL | Age: 53
Discharge: HOME | End: 2023-12-07
Attending: INTERNAL MEDICINE
Payer: COMMERCIAL

## 2023-12-07 ENCOUNTER — LAB ENCOUNTER (OUTPATIENT)
Dept: LAB | Facility: HOSPITAL | Age: 53
End: 2023-12-07
Attending: INTERNAL MEDICINE
Payer: COMMERCIAL

## 2023-12-07 DIAGNOSIS — R19.4 CHANGE IN BOWEL HABITS: ICD-10-CM

## 2023-12-07 DIAGNOSIS — R19.5 LOOSE STOOLS: ICD-10-CM

## 2023-12-07 LAB
CRP SERPL-MCNC: <0.4 MG/DL (ref ?–1)
IGA SERPL-MCNC: 209 MG/DL (ref 40–350)

## 2023-12-07 PROCEDURE — 36415 COLL VENOUS BLD VENIPUNCTURE: CPT

## 2023-12-07 PROCEDURE — 74018 RADEX ABDOMEN 1 VIEW: CPT | Performed by: INTERNAL MEDICINE

## 2023-12-07 PROCEDURE — 86140 C-REACTIVE PROTEIN: CPT

## 2023-12-07 PROCEDURE — 82784 ASSAY IGA/IGD/IGG/IGM EACH: CPT

## 2023-12-07 PROCEDURE — 86364 TISS TRNSGLTMNASE EA IG CLAS: CPT

## 2023-12-08 ENCOUNTER — IMMUNIZATION (OUTPATIENT)
Dept: LAB | Age: 53
End: 2023-12-08
Attending: EMERGENCY MEDICINE
Payer: COMMERCIAL

## 2023-12-08 DIAGNOSIS — Z23 NEED FOR VACCINATION: Primary | ICD-10-CM

## 2023-12-08 LAB — TTG IGA SER-ACNC: 0.5 U/ML (ref ?–7)

## 2023-12-08 PROCEDURE — 90480 ADMN SARSCOV2 VAC 1/ONLY CMP: CPT

## 2023-12-09 PROCEDURE — 82656 EL-1 FECAL QUAL/SEMIQ: CPT | Performed by: INTERNAL MEDICINE

## 2023-12-09 PROCEDURE — 83993 ASSAY FOR CALPROTECTIN FECAL: CPT | Performed by: INTERNAL MEDICINE

## 2023-12-10 ENCOUNTER — LAB ENCOUNTER (OUTPATIENT)
Dept: LAB | Facility: HOSPITAL | Age: 53
End: 2023-12-10
Attending: INTERNAL MEDICINE
Payer: COMMERCIAL

## 2023-12-11 LAB — CALPROTECTIN STL-MCNT: 20.1 ΜG/G (ref ?–50)

## 2023-12-14 ENCOUNTER — APPOINTMENT (OUTPATIENT)
Dept: PHYSICAL THERAPY | Facility: HOSPITAL | Age: 53
End: 2023-12-14
Attending: FAMILY MEDICINE
Payer: COMMERCIAL

## 2023-12-18 LAB — PANCREATIC ELAST FECAL: >500 UG ELAST./G

## 2023-12-20 ENCOUNTER — TELEPHONE (OUTPATIENT)
Dept: PHYSICAL THERAPY | Facility: HOSPITAL | Age: 53
End: 2023-12-20

## 2023-12-27 ENCOUNTER — PATIENT MESSAGE (OUTPATIENT)
Dept: PULMONOLOGY | Facility: CLINIC | Age: 53
End: 2023-12-27

## 2023-12-27 NOTE — TELEPHONE ENCOUNTER
From: Sayra Boateng  To: Lisa Dunham  Sent: 12/27/2023 11:26 AM CST  Subject: Oral Appliance    Good Morning,    I have been using an oral appliance for a couple of months. It is helping me sleep so much better than I had been and definitely better than the CPAP machine. I know you wanted me to hold off on returning the CPAP machine, but I can see no instance when it would be something I would use again. Can you send me a form that shows you sign off on returning it or is there something else that do I need to do next?      Thank you,  Fannie Christianson

## 2023-12-29 NOTE — TELEPHONE ENCOUNTER
Kimberly uMrray, DO  [facility] Clinical Staff2 hours ago (1:34 PM)       From my perspective it is okay if she wants to continue using oral device. Does she need something specific from my end potentially return her CPAP on her own. I am just unclear in terms of care she needs some specific paperwork. In summary I am okay if she wants to continue oral device and return her CPAP.

## 2023-12-29 NOTE — TELEPHONE ENCOUNTER
Informed patient of Dr Hung Reyes message. Told patient to contact [facility] and to contact us if they request any documentation from our clinic.  Provided patient with [facility] phone number

## 2024-01-02 ENCOUNTER — OFFICE VISIT (OUTPATIENT)
Dept: PHYSICAL THERAPY | Facility: HOSPITAL | Age: 54
End: 2024-01-02
Attending: FAMILY MEDICINE
Payer: COMMERCIAL

## 2024-01-02 PROCEDURE — 97110 THERAPEUTIC EXERCISES: CPT

## 2024-01-02 PROCEDURE — 97140 MANUAL THERAPY 1/> REGIONS: CPT

## 2024-01-02 NOTE — PROGRESS NOTES
DC SUMMARY  Referring Physician: Nelson Gibson DO  Date of Onset:  chronic  Date of Eval:    Diagnosis: Degeneration of cervical intervertebral disc (M50.30)  Cervicogenic headache (G44.86)  Insurance: [employer] [place]  Precautions:  Sensitive to massage  Neck Disability Index Score  Pre rx: Neck Disability Index Score  Score: 32 % (6/1/2023  9:32 AM)    Subjective:  Patient reports overall a reduction in frequency and intensity of migraine headaches.   Yesterday HA 3/10 and neck pain 0/10 most of day, and 1.5/10 after swimming.    Does note elevation in pain with weather/barometric pressure changes such as significant drop of elevation in pressure can trigger headaches.  This AM reports upon arrival elevated HA due to weather change and neck pain upon awaking unclear as to cause but patient did report possibly \"sleeping funny\". Pain reduced post treatment.  Pain pre rx: 5.5/10 for HA and neck. Patient reports Migraine medication, Emgality helping with severity and frequency of symptoms, along with neck exercises and pool exercise program    Headaches 1.5-6/10 after getting Emgality shots,    3rd of month self injection.          Date 10/4/23 10/12/23 11/16/23 1/2/24          Pain Range   1.5/-6/10 range HA 3/10 and neck 0/10 most of yesterday,    Pain current Migraine symptoms resent with aura and increased sensitivity to light more present today    Neck and shoulder 4/10 3/10 neck and 2/10 in HA    Yesterday 9/10 but once weather front came thru pain was reduced 1.5/10 Headache with shot in thigh has helped knee stiffness Today HA: 5.5/10, neck 5.5/10        Assessment: Patient with improved migraine symptoms.  Pain much lower for neck and migraine today. Patient does report a pattern of increased symptoms with significant barometric pressure changes. Has been benefiting from stretching and pool exercises and self administered Emgality medication for headaches and overall neck symptoms. Did feel home simulation of  cervical traction with good benefit but does not desire to purchase home unit at this time due to overall improvement without use of traction at home. Has made progress in AROM, posture awareness and pain management.  Has made good overall gains in pain reduction, ms guarding/hypertonicity reduction, exercise tolerance, headache reduction.     GOALS BELOW MOSTLY ALL MET. Plan to DC PT and patient to continue with HEP with addition of Cervical Isometrics for strengthening ex.      Plan: DC PT at this time to independent HEP and pool ex program    Sincerely,  Electronically signed by therapist: Jared Haynes, PT     Physician's certification required:  No  Please co-sign or sign and return this letter via fax as soon as possible to [phone].   I certify the need for these services furnished under this plan of treatment and while under my care.    X___________________________________________________ Date____________________    Certification From: 1/2/2024  To:4/1/2024     Objective:   Observation/Posture:fwd head flexed CT junction, protraction scapular  Palpation: 1/2/24:  tenderness and mild elevation ms tone at right cervical p/s and right levator scapula. Decreased post treatment      initial initial 9/25/23 9/25/23 9/27/23 9/27/23 10/4/23 10/4/23 11/16/23 11/16/23 1/2/24 1/2/24   Cervical AROM: deg Pain (+/-) deg pn deg pn deg pn deg pn     Flexion 55 -                NT      55   - 55 Upon return from FF   Extension 62 dizziness                 NT      65  -  62 Upon return from Ext 3/10 brief    R Sidebend 42 - stretch sl soreness back of neck                38     Stretch on left UT  36 deg  post rx  Stretch on left UT   38  - stretch 38 Stretch left UT   L Sidebend 42 -stretch                42   42 deg  post rx   Stretch  on right UT   42 Right neck stretch 42 Stretch and pain on right upper trap   R Rotation 62 Pn right cervical 3.5/10              80              Stretch right upper trap and slight pn    75  pre stretch 78  64 Soreness right Upper trap   L Rotation 58 No pain just stretch               64 Stretch no significant pn   67 pre stretch 75 Stretch in neck  72 \"Nice stretch\"   Protrusion WNL         WNL  WNL WNL   Retraction WNL         WNL  WNL WNL            Shoulder AROM: deg     initial Initial 8/3/23 8/3/23 9/20/23 9/20/23 11/16/23 11/16/23 1/2/24 1/2/24     R    L R L R L  R L R L:    Flex 170 sl pn 163        170 slight UT and inferior cuff pn 160 stretch   170 good stretch 163 185 178 185 178   Abd 168 tight 170 pn   160 abd tight   170 pn 170 tight 170 tight 170 stretch 178 stretch WNL WNL   ER WNL WNL    WNL WNL WNL WNL WNL  WNL WNL WNL   IR WNL WNL   WNL WNL WNL WNL WNL WNL WNL WNL   Strength UE:   -/5 BUE to be determined                 Flexibility: WNL, min, mod severe flexibility limitations   1/2/24 1/2/24     R L   Upper trap minimally restricted moderately restricted   Levator scap minimally restricted moderately restricted   Scalenes minimally restricted moderately restricted   Pec Major moderately restricted moderately restricted   Pec Minor moderately restricted moderately restricted   Lats WNL WNL      1/2/24: Palpation: reduced overall ms tone at both upper traps, both levator scaps, suboccipitals, cervical p/s from initial     Neuro Screen: intact sensation,         Goals: 8-12 sessions   Pt will be independent and compliant with comprehensive HEP to maintain progress achieved in PT to reduce ms tone, increase postural support ms for reduction of scap protraction  MET  Patient will demonstrate a reduction in max Headache and neck area pain at cervical region from  max of  10/10 down to 4-5/10 (50%)  MOSTLY MET  Pt will have improved thoracic PA mobility to WNL to improve cervical ROM as well as promote upright posturing and decreased pain with cervical AROM   MET  Pt will have improved thoracic PA mobility to WNL to improve cervical ROM as well as promote upright posturing and  decreased pain with cervical AROM     MET  Patient with independent UE extremity, scapular stabilitzation ms and cervical strengthening ex to improved ms length and improve pectoral  flexibility  to decrease strain on cervical spine MET      DB=Diaphragmatic breathing, LTR=lower trunk rotation, SKTC=Single knee to chest, DKTC=double knee to chest, BFB=pressure biofeedback training, PPT= posterior pelvic tilt, TFL=Tensor fascia maria eugenia, RF=rectus femoris, H/L=hooklying  Position Therap. Exercise HEP 8/8/23  6/12 9/20/23  7/12 9/25/23  8/12 9/27/23  9/12 10/4/23  10/12 10/12/23  11/12 11/16/23  12/12 1/2/2024  13/13    Reassessment, cervical and shoulder bilateral AROM, posture,    X  X cervical rotation and lateral flexion X lateral flexion only X bilat rotation  X cervical shoulder bilateral X cervical shoulder bilateral AROM measures    H/L  Contract relax Upper traps post     X 3 x 5 secs left and right  X 3 x 5 secs holds left and right each      Cervical isometrics for Ext, lateral flexion bilat 5 x 5 secs H        X                 H/L Onto 1/2 foam roll along spine  Y' and T's stretches  X 1 x 30 secs each      X     supine PROM cervical rotation and AAROM with correct axis of rotation   X  x  X      seated AROM shoulder flex/abd, cervical AROM all major motions   X       X   seated Lateral neck flexion stretch B 3 x 15 secs with DB H  X  X  X brief post X  x X  X   seated Diaphragmatic breathing ex (DB) H,            Seated Cervical retractions 10x 3 secs H     X 3 x 5 secs  X 3 x 5 secs    Seated  Scapular retractions  H           Seated  Shoulder rolls and DB combined            Seated  OH pulleys flex and abd            standing Posture training with mirror feedback use of two mirrors for cervical retractions and scapular retractions            Seated  Levator scap stretches H      X 3 x 15 secs     Seated  Passive pect major and minor assisted stretches by therapist sustained  30 secs       X 1 mins      Manual rx            Supine OA release     x x x x x     Seated with head on plinth STM to both C-p/s, and both UT and LS, and sub-occipitals  moderate pressure  X  x x x x X  X   supine Deep pressure to left and right Lev scap   X  x x x X  X   Supine  Deep pressure to left and right UT   X  x x x X  X   supine MWM for lateral flexion and Rotation with grade 2-3 SNAG C3-6    X  x X     Supine  Light Sustained pressure with DB at both upper traps to tolerance and to both Levator scap    X deep pressure  x  X  x   supine Sustained pressure with DB to sub 0ccipitals   x X x x X     Supine  Light STM to right >left cervical p/s  X   X        supine More light to moderate STM to both cervical p/s  X  X         supine Intermittent manual cervical traction  X seated manual X supine          intermittent C-traction             Intermittent C-traction supine  # max pull,  15 secs on 15 secs off 10 x cycle  For next session held  10 x 30 secs holds@ 15 # 10 x 30 secs holds 15# approx 15 secs rest X 10x 20 secs holds 15# and 15 secs X 10x 20 secs holds 15# and 15 secs                              H = HEP. Pt given copies of all exercise for home program.  X = exercise completed as described previously  Access Code: [email or web address]  URL: [email or web address]  Date: 06/01/2023  Prepared by: Jared Haynes     Exercises  - Seated Scapular Retraction  - 3 x daily - 7 x weekly - 1 sets - 5 reps - 2 s hold  - Seated Passive Cervical Retraction  - 3 x daily - 7 x weekly - 1 sets - 5 reps - 2 secs hold  - Seated Cervical Sidebending AROM  - 3 x daily - 7 x weekly - 1 sets - 5 reps - 15 s hold  - Seated Cervical Sidebending AROM (Mirrored)  - 3 x daily - 7 x weekly - 1 sets - 5 reps - 15 s hold    Charged Units Units Minutes   Neuro     Ther ex 2 28   Gait     Man Tx 1 12   US     Mechanical traction     Total Timed  40   Total Tx Time  40      Access Code: [email or web address]  URL: [email or web address]  Date: 08/04/2023  Prepared by:  Jared Haynes    Exercises  - Side Stepping  - 1 x daily - 2-3 x weekly - 1-2 lengths lengths  - Forward Walking  - 1 x daily - 2-3 x weekly  - Backward Walking  - 1 x daily - 2-3 x weekly - 3 sets - 10 reps  - Standing Hip Circles at Pool Wall  - 1 x daily - 2-3 x weekly - 3 sets - 10 reps  - Standing March at Pool Wall  - 1 x daily - 2-3 x weekly - 3 sets - 10 reps  - Heel Toe Raises at Pool Wall  - 1 x daily - 2-3 x weekly - 3 sets - 10 reps  - Standing Hip Abduction Adduction at Pool Wall  - 1 x daily - 2-3 x weekly - 3 sets - 10 reps  - Flutter Kick on Back with Noodle  - 1 x daily - 2-3 x weekly - 2-4  lengths    1/2/24:  HEP  Access Code: [email or web address]  URL: [email or web address]  Date: 01/02/2024  Prepared by: Jared Haynes    Exercises    - Standing Isometric Cervical Sidebending with Manual Resistance (Mirrored)  - 1 x daily - 3-4 x weekly - 1 sets - 5 reps - 5 secs hold  - Standing Isometric Cervical Flexion with Manual Resistance  - 1 x daily - 3-4 x weekly - 1 sets - 5 reps - 5 secs hold  - Standing Isometric Cervical Extension with Manual Resistance  - 1 x daily - 3-4 x weekly - 1 sets - 5 reps - 5 sec hold  - Standing Isometric Cervical Sidebending with Manual Resistance  - 1 x daily - 3-4 x weekly - 1 sets - 5 reps - 5 secs hold

## 2024-01-31 ENCOUNTER — TELEPHONE (OUTPATIENT)
Dept: PHYSICAL THERAPY | Facility: HOSPITAL | Age: 54
End: 2024-01-31

## 2024-02-01 ENCOUNTER — OFFICE VISIT (OUTPATIENT)
Dept: PHYSICAL THERAPY | Facility: HOSPITAL | Age: 54
End: 2024-02-01
Attending: FAMILY MEDICINE
Payer: COMMERCIAL

## 2024-02-01 DIAGNOSIS — M51.36 DEGENERATION OF LUMBAR INTERVERTEBRAL DISC: Primary | ICD-10-CM

## 2024-02-01 PROCEDURE — 97161 PT EVAL LOW COMPLEX 20 MIN: CPT

## 2024-02-01 PROCEDURE — 97110 THERAPEUTIC EXERCISES: CPT

## 2024-02-01 NOTE — PROGRESS NOTES
SPINE EVALUATION:     Diagnosis:   Degeneration of lumbar intervertebral disc (M51.36)       Referring Provider: Weiler  Date of Evaluation:    2/1/2024    Precautions:   Bilateral knee OA Next MD visit:   none scheduled  Date of Surgery: n/a     PATIENT SUMMARY   Betty Lopez is a 53 year old adult who presents to therapy today with complaints of chronic low back pain.    History of Injury  Patient states they have intermittent hx of low back pain. Symptoms have become more constant over the last 6 months; no history of trauma.  Patient has history of abdominal issues and had imaging, incidental findings of lumbar spine showed levoscoliosis    Symptom Description:  Patient repots symptoms along low back; can be right or left side. States that R side is more pronounced. Reports muscle spasms along back usually during early morning. Reports pain can radiate down left leg. Has history of severe knee OA bilaterally; states that L knee will lock up at times and this can cause back spasm. Patient will occasionally feel numbness/tingling along L lateral foot radiating up to knee; or R calf-- feels like this might be a circulation issue (mostly at night). Pt describes pain level current 1/10, at best 0/10, at worst 6/10.   Current functional limitations include lifting heavy items from floor, prolonged positioning (sitting), standing longer periods sleeping in bed on side.  Eased with: walking, Meloxicam 1x/day, ibuprofen    Previous Level of Function:  Betty describes prior level of function independent. Patient works as a ; job includes standing/walking/desk work. Patient ambulates with cane for knee pain. Patient enjoys swimming 3x/wk Pt goals include reducing pain, learn exercises to help symptoms.   Past medical history was reviewed with Betty. Significant findings include   Past Medical History:   Diagnosis Date    Anxiety 2018    Death of a sister    Anxiety state     Calculus of kidney      Carpal tunnel syndrome 03/2023    Depression     Essential hypertension     High blood pressure     High cholesterol     Hx of motion sickness     riding in cars    Hyperalgia 12/06/2020    Hyperlipidemia 2021    Knee pain, bilateral     Migraine headache     Migraines     Osteoarthritis     Sleep apnea     Tremor June 2023    possible side-effect of medications    Visual impairment     Readers     Pt denies diplopia, dysarthria, dysphasia, dizziness, drop attacks, bowel/bladder changes, saddle anesthesia, and AVELINA LE N/T.    ASSESSMENT  Betty presents to physical therapy evaluation with primary c/o chronic low back pain. The results of the objective tests and measures show hip abductor weakness, soft tissue restrictions along low back, and functional trendelenburg in stance.  Functional deficits include but are not limited to lifting items from floor, sleeping on side, standing for longer periods, and standing after prolonged sitting.  Signs and symptoms are consistent with diagnosis of chronic nonspecific low back pain. Pt and PT discussed evaluation findings, pathology, POC and HEP.  Pt voiced understanding and performs HEP correctly without reported pain. Skilled Physical Therapy is medically necessary to address the above impairments and reach functional goals.     OBJECTIVE:   Observation/Posture: Patient stands with bilateral knee hyperextension.     Lumbar AROM: (* denotes performed with pain)  Flexion: Hands flat on floor  Extension: 20deg  Sidebending: R to knee joint; L to knee joint  Rotation: R 75%; L 75%    Palpation: tenderness to palpation R PSIS, R quadratus lumborum    Strength: (* denotes performed with pain)  LE   Hip flexion (L2): R 5/5; L 5/5  Hip abduction: R 4-/5; L 4-/5  Hip Extension: R 4-/5; L 4-/5   Hip ER: R 4/5; L 4/5  Hip IR: R 5/5; L 5/5  Knee Flexion: R 5/5; L 5/5   Knee extension (L3): R 4/5; L 4/5      Flexibility:   LE   Hip Flexor: R WNL, L WNL  Hamstrings: R WNL; L  WNL  Piriformis: R WNL; L WNL     Special tests:   Quadrant testing: non-provocative  Beighton Score: 3/9    Gait: pt ambulates on level ground with  Cane in LLE; trunk lean towards left  .  Balance: SLS R 5, L 3 *Trendelenburg bilaterally    Today’s Treatment and Response:   Pt education was provided on exam findings, treatment diagnosis, treatment plan, expectations, and prognosis. Pt was also provided recommendations for activity modifications, possible soreness after evaluation, modalities as needed [ice/heat], postural corrections, and importance of remaining active  Patient was instructed in and issued a HEP for: None at this visit    Charges: PT Eval Low Complexity, TE x 1      Total Timed Treatment: 11 min     Total Treatment Time: 46 min     Based on clinical rationale and outcome measures, this evaluation involved Low Complexity decision making  PLAN OF CARE:    Goals: (to be met in 8 visits)   Pt will improve transversus abdominis recruitment to perform proper isometric contraction without requiring verbal or tactile cuing to promote advancement of therex   Pt will demonstrate good understanding of proper posture and body mechanics to decrease pain and improve spinal safety   Pt will report improved symptom centralization and absence of radicular symptoms for 3 consecutive days to improve function with ADL   Pt will have decreased paraspinal mm tension to tolerate standing >20 minutes for work and home activities   Pt will demonstrate improved core strength to be able to perform squatting with <2/10 pain   Pt will be independent and compliant with comprehensive HEP to maintain progress achieved in PT       Frequency / Duration: Patient will be seen for 1-2 x/week or a total of 8 visits over a 90 day period. Treatment will include: Gait training, Manual Therapy, Neuromuscular Re-education, Self-Care Home Management, Therapeutic Activities, Therapeutic Exercise, and Home Exercise Program  instruction    Education or treatment limitation: None  Rehab Potential:excellent    Oswestry Disability Index Score  Score: 44 % (2/1/2024  9:26 AM)      Patient/Family/Caregiver was advised of these findings, precautions, and treatment options and has agreed to actively participate in planning and for this course of care.    Thank you for your referral. Please co-sign or sign and return this letter via fax as soon as possible to [phone]. If you have any questions, please contact me at Dept: [phone]    Sincerely,  Electronically signed by therapist: Beth Prater PT    Physician's certification required: Yes  I certify the need for these services furnished under this plan of treatment and while under my care.    X___________________________________________________ Date____________________    Certification From: 2/1/2024  To:5/1/2024

## 2024-02-06 ENCOUNTER — APPOINTMENT (OUTPATIENT)
Dept: PHYSICAL THERAPY | Facility: HOSPITAL | Age: 54
End: 2024-02-06
Attending: FAMILY MEDICINE
Payer: COMMERCIAL

## 2024-02-06 ENCOUNTER — TELEPHONE (OUTPATIENT)
Dept: PHYSICAL THERAPY | Facility: HOSPITAL | Age: 54
End: 2024-02-06

## 2024-02-08 ENCOUNTER — APPOINTMENT (OUTPATIENT)
Dept: PHYSICAL THERAPY | Facility: HOSPITAL | Age: 54
End: 2024-02-08
Attending: FAMILY MEDICINE
Payer: COMMERCIAL

## 2024-02-15 ENCOUNTER — OFFICE VISIT (OUTPATIENT)
Dept: PHYSICAL THERAPY | Facility: HOSPITAL | Age: 54
End: 2024-02-15
Attending: FAMILY MEDICINE
Payer: COMMERCIAL

## 2024-02-15 PROCEDURE — 97530 THERAPEUTIC ACTIVITIES: CPT

## 2024-02-15 PROCEDURE — 97110 THERAPEUTIC EXERCISES: CPT

## 2024-02-15 NOTE — PROGRESS NOTES
Diagnosis:   Degeneration of lumbar intervertebral disc (M51.36)         Referring Provider: Weiler  Date of Evaluation:    2/1/24    Precautions:   Knee OA Next MD visit:   none scheduled  Date of Surgery: n/a   Insurance Primary/Secondary: [employer] [place] PPO / N/A     # Auth Visits: no auth            Subjective: Patient states that back has been sore lately; has been sick so this might be contributing. Has been trying to use cane in alternating hands and states that this is helping pain.  Current functional limitations include lifting heavy items from floor, prolonged positioning (sitting), standing longer periods sleeping in bed on side.  Eased with: walking, Meloxicam 1x/day, ibuprofen    Pain: 3/10      Objective:     From IE:  Observation/Posture: Patient stands with bilateral knee hyperextension.      Lumbar AROM: (* denotes performed with pain)  Flexion: Hands flat on floor  Extension: 20deg  Sidebending: R to knee joint; L to knee joint  Rotation: R 75%; L 75%     Palpation: tenderness to palpation R PSIS, R quadratus lumborum     Strength: (* denotes performed with pain)  LE   Hip flexion (L2): R 5/5; L 5/5  Hip abduction: R 4-/5; L 4-/5  Hip Extension: R 4-/5; L 4-/5            Hip ER: R 4/5; L 4/5  Hip IR: R 5/5; L 5/5  Knee Flexion: R 5/5; L 5/5            Knee extension (L3): R 4/5; L 4/5                Flexibility:   LE   Hip Flexor: R WNL, L WNL  Hamstrings: R WNL; L WNL  Piriformis: R WNL; L WNL      Special tests:   Quadrant testing: non-provocative  Beighton Score: 3/9     Gait: pt ambulates on level ground with  Cane in LLE; trunk lean towards left  .  Balance: SLS R 5, L 3 *Trendelenburg bilaterally      Assessment: Good participation in planned session. Limited motor control of glute med in weight bearing position; patient also limited with this exercise due to onset of knee pain in single limb stance.       Goals:    (to be met in 8 visits)   Pt will improve transversus abdominis recruitment to  perform proper isometric contraction without requiring verbal or tactile cuing to promote advancement of therex   Pt will demonstrate good understanding of proper posture and body mechanics to decrease pain and improve spinal safety   Pt will report improved symptom centralization and absence of radicular symptoms for 3 consecutive days to improve function with ADL   Pt will have decreased paraspinal mm tension to tolerate standing >20 minutes for work and home activities   Pt will demonstrate improved core strength to be able to perform squatting with <2/10 pain   Pt will be independent and compliant with comprehensive HEP to maintain progress achieved in PT    Plan:  Patient will be seen for 1-2 x/week or a total of 8 visits over a 90 day period. Treatment will include: Gait training, Manual Therapy, Neuromuscular Re-education, Self-Care Home Management, Therapeutic Activities, Therapeutic Exercise, and Home Exercise Program instruction     Date: 2/6/2024  TX#: 2/8 Date:                 TX#: 3/ Date:                 TX#: 4/ Date:                 TX#: 5/ Date:   Tx#: 6/   MT       TE  -Open book x 15 B  -LTR 10x2  -Bridge x10 (trialed, increase in back pain)  -Hip abd ISO 10sec x 10  -Hip add ISO with ball 10sec x 10  -Seated trunk flexion quadrant stretch with swissball 5sec 10x2        TA  -Cane fitting  -Hip hike off step 5sec to fatigue x 2              HEP: Open book, LTR, Hip hike    Charges: TE x 2; TA x 1       Total Timed Treatment: TE x 30; TA x 10 min  Total Treatment Time: 40 min

## 2024-02-21 DIAGNOSIS — G43.101 MIGRAINE WITH AURA AND WITH STATUS MIGRAINOSUS, NOT INTRACTABLE: ICD-10-CM

## 2024-02-21 RX ORDER — GALCANEZUMAB 120 MG/ML
1 INJECTION, SOLUTION SUBCUTANEOUS
Qty: 1 ML | Refills: 3 | Status: SHIPPED | OUTPATIENT
Start: 2024-02-21

## 2024-02-21 NOTE — TELEPHONE ENCOUNTER
Requested Prescriptions     Pending Prescriptions Disp Refills    EMGALITY 120 MG/ML Subcutaneous Solution Auto-injector [Pharmacy Med Name: EMGALITY 120MG/ML AUTO INJECTOR 1ML] 1 mL 5     Sig: ADMINISTER 1 ML UNDER THE SKIN EVERY 30 DAYS AS DIRECTED        LOV: 6/16/23  NOV: none    [place]: 8/28/23 (QTY 1/5RF)

## 2024-02-22 ENCOUNTER — OFFICE VISIT (OUTPATIENT)
Dept: PHYSICAL THERAPY | Facility: HOSPITAL | Age: 54
End: 2024-02-22
Attending: FAMILY MEDICINE
Payer: COMMERCIAL

## 2024-02-22 PROCEDURE — 97112 NEUROMUSCULAR REEDUCATION: CPT

## 2024-02-22 PROCEDURE — 97140 MANUAL THERAPY 1/> REGIONS: CPT

## 2024-02-22 PROCEDURE — 97110 THERAPEUTIC EXERCISES: CPT

## 2024-02-22 NOTE — PROGRESS NOTES
Diagnosis:   Degeneration of lumbar intervertebral disc (M51.36)         Referring Provider: Weiler  Date of Evaluation:    2/1/24    Precautions:   Knee OA Next MD visit:   none scheduled  Date of Surgery: n/a   Insurance Primary/Secondary: [employer] [place] / N/A     # Auth Visits: no auth            Subjective: Patient states no significant changes in low back pain since last visit. States that knee pain has been worse lately; she has a migraine today.   Current functional limitations include lifting heavy items from floor, prolonged positioning (sitting), standing longer periods sleeping in bed on side.  Eased with: walking, Meloxicam 1x/day, ibuprofen    Pain: 2/10      Objective:     From IE:  Observation/Posture: Patient stands with bilateral knee hyperextension.      Lumbar AROM: (* denotes performed with pain)  Flexion: Hands flat on floor  Extension: 20deg  Sidebending: R to knee joint; L to knee joint  Rotation: R 75%; L 75%     Palpation: tenderness to palpation R PSIS, R quadratus lumborum     Strength: (* denotes performed with pain)  LE   Hip flexion (L2): R 5/5; L 5/5  Hip abduction: R 4-/5; L 4-/5  Hip Extension: R 4-/5; L 4-/5            Hip ER: R 4/5; L 4/5  Hip IR: R 5/5; L 5/5  Knee Flexion: R 5/5; L 5/5            Knee extension (L3): R 4/5; L 4/5                Flexibility:   LE   Hip Flexor: R WNL, L WNL  Hamstrings: R WNL; L WNL  Piriformis: R WNL; L WNL      Special tests:   Quadrant testing: non-provocative  Beighton Score: 3/9     Gait: pt ambulates on level ground with  Cane in LLE; trunk lean towards left  .  Balance: SLS R 5, L 3 *Trendelenburg bilaterally      Assessment: Patient with hypomobility along thoracolumbar junction; addressed with joint mobilizations. Patient participates in core strengthening this session; no limitation of low back symptoms, however, reports of knee pain with extended periods of standing.        Goals:    (to be met in 8 visits)   Pt will improve transversus  abdominis recruitment to perform proper isometric contraction without requiring verbal or tactile cuing to promote advancement of therex   Pt will demonstrate good understanding of proper posture and body mechanics to decrease pain and improve spinal safety   Pt will report improved symptom centralization and absence of radicular symptoms for 3 consecutive days to improve function with ADL   Pt will have decreased paraspinal mm tension to tolerate standing >20 minutes for work and home activities   Pt will demonstrate improved core strength to be able to perform squatting with <2/10 pain   Pt will be independent and compliant with comprehensive HEP to maintain progress achieved in PT    Plan:  Patient will be seen for 1-2 x/week or a total of 8 visits over a 90 day period. Treatment will include: Gait training, Manual Therapy, Neuromuscular Re-education, Self-Care Home Management, Therapeutic Activities, Therapeutic Exercise, and Home Exercise Program instruction     Date: 2/6/2024  TX#: 2/8 Date:2/22/2024                 TX#: 3/8 Date:                 TX#: 4/ Date:                 TX#: 5/ Date:   Tx#: 6/   MT MT  -Rotational CPA T12-L1/2 Gr III  -Soft tissue work R QL, lumbar paraspinals        TE  -Open book x 15 B  -LTR 10x2  -Bridge x10 (trialed, increase in back pain)  -Hip abd ISO 10sec x 10  -Hip add ISO with ball 10sec x 10  -Seated trunk flexion quadrant stretch with swissball 5sec 10x2  TE  -Open book x 15 B  -Standing hip abduction 10x2        TA  -Cane fitting  -Hip hike off step 5sec to fatigue x 2        NM  -Prone glute set x 2min  -PPU 8x2  -Anti-rotation press 10x2 green t-band   -Alternating shoulder extension t-band 10x2 B      HEP: Open book, LTR, Hip hike    Charges: TE x 1; TA x 1; NM x 1      Total Timed Treatment: TE x 10; MT x 12 min; NM x 16 min   Total Treatment Time: 38 min

## 2024-02-29 ENCOUNTER — OFFICE VISIT (OUTPATIENT)
Dept: PHYSICAL THERAPY | Facility: HOSPITAL | Age: 54
End: 2024-02-29
Attending: FAMILY MEDICINE
Payer: COMMERCIAL

## 2024-02-29 PROCEDURE — 97110 THERAPEUTIC EXERCISES: CPT

## 2024-02-29 PROCEDURE — 97112 NEUROMUSCULAR REEDUCATION: CPT

## 2024-02-29 NOTE — PROGRESS NOTES
Diagnosis:   Degeneration of lumbar intervertebral disc (M51.36)         Referring Provider: Weiler  Date of Evaluation:    2/1/24    Precautions:   Knee OA Next MD visit:   none scheduled  Date of Surgery: n/a   Insurance Primary/Secondary: [employer] [place] / N/A     # Auth Visits: no auth            Subjective: Patient states that exercises are helping their symptoms. Today they have no back pain. Pt reports their knees are very sore today.  Current functional limitations include lifting heavy items from floor, prolonged positioning (sitting), standing longer periods sleeping in bed on side.  Eased with: walking, Meloxicam 1x/day, ibuprofen    Pain: 0/10      Objective:     From IE:  Observation/Posture: Patient stands with bilateral knee hyperextension.      Lumbar AROM: (* denotes performed with pain)  Flexion: Hands flat on floor  Extension: 20deg  Sidebending: R to knee joint; L to knee joint  Rotation: R 75%; L 75%     Palpation: tenderness to palpation R PSIS, R quadratus lumborum     Strength: (* denotes performed with pain)  LE   Hip flexion (L2): R 5/5; L 5/5  Hip abduction: R 4-/5; L 4-/5  Hip Extension: R 4-/5; L 4-/5            Hip ER: R 4/5; L 4/5  Hip IR: R 5/5; L 5/5  Knee Flexion: R 5/5; L 5/5            Knee extension (L3): R 4/5; L 4/5                Flexibility:   LE   Hip Flexor: R WNL, L WNL  Hamstrings: R WNL; L WNL  Piriformis: R WNL; L WNL      Special tests:   Quadrant testing: non-provocative  Beighton Score: 3/9     Gait: pt ambulates on level ground with  Cane in LLE; trunk lean towards left  .  Balance: SLS R 5, L 3 *Trendelenburg bilaterally      Assessment: Patient presents with low symptom irritability and intensity today. Progressed core and hip strengthening this session per plan of care allowing for improved participation with bending and lifting activities.        Goals:    (to be met in 8 visits)   Pt will improve transversus abdominis recruitment to perform proper isometric  contraction without requiring verbal or tactile cuing to promote advancement of therex   Pt will demonstrate good understanding of proper posture and body mechanics to decrease pain and improve spinal safety   Pt will report improved symptom centralization and absence of radicular symptoms for 3 consecutive days to improve function with ADL   Pt will have decreased paraspinal mm tension to tolerate standing >20 minutes for work and home activities   Pt will demonstrate improved core strength to be able to perform squatting with <2/10 pain   Pt will be independent and compliant with comprehensive HEP to maintain progress achieved in PT    Plan:  Patient will be seen for 1-2 x/week or a total of 8 visits over a 90 day period. Treatment will include: Gait training, Manual Therapy, Neuromuscular Re-education, Self-Care Home Management, Therapeutic Activities, Therapeutic Exercise, and Home Exercise Program instruction     Date: 2/6/2024  TX#: 2/8 Date:2/22/2024                 TX#: 3/8 Date:2/29/2024                 TX#: 4/8 Date:                 TX#: 5/ Date:   Tx#: 6/   MT MT  -Rotational CPA T12-L1/2 Gr III  -Soft tissue work R QL, lumbar paraspinals        TE  -Open book x 15 B  -LTR 10x2  -Bridge x10 (trialed, increase in back pain)  -Hip abd ISO 10sec x 10  -Hip add ISO with ball 10sec x 10  -Seated trunk flexion quadrant stretch with swissball 5sec 10x2  TE  -Open book x 15 B  -Standing hip abduction 10x2   TE  -LTR 10x2   -DKTC swissball 10x3  -Side-lying clamshells 20 B  -LBW red t-band 10steps x 2laps  -Seated trunk flexion 5sec x 10 B     TA  -Cane fitting  -Hip hike off step 5sec to fatigue x 2        NM  -Prone glute set x 2min  -PPU 8x2  -Anti-rotation press 10x2 green t-band   -Alternating shoulder extension t-band 10x2 B NM  -PPU 10x2  -Prone hip extension (knee bent) 8x3 B  -Bilateral shoulder ext with core activation green t-band 10x2     HEP: Open book, LTR, Hip hike, LBW red t-band    Charges: TE x  2; NM x 1      Total Timed Treatment: TE x 27 NM x 16 min   Total Treatment Time: 43 min

## 2024-03-19 ENCOUNTER — APPOINTMENT (OUTPATIENT)
Dept: PHYSICAL THERAPY | Facility: HOSPITAL | Age: 54
End: 2024-03-19
Attending: FAMILY MEDICINE
Payer: COMMERCIAL

## 2024-04-01 ENCOUNTER — APPOINTMENT (OUTPATIENT)
Dept: PHYSICAL THERAPY | Facility: HOSPITAL | Age: 54
End: 2024-04-01
Attending: FAMILY MEDICINE
Payer: COMMERCIAL

## 2024-04-03 ENCOUNTER — TELEPHONE (OUTPATIENT)
Dept: PHYSICAL THERAPY | Facility: HOSPITAL | Age: 54
End: 2024-04-03

## 2024-04-21 ENCOUNTER — OFFICE VISIT (OUTPATIENT)
Dept: FAMILY MEDICINE CLINIC | Facility: CLINIC | Age: 54
End: 2024-04-21
Payer: COMMERCIAL

## 2024-04-21 VITALS
HEIGHT: 63 IN | SYSTOLIC BLOOD PRESSURE: 131 MMHG | TEMPERATURE: 99 F | DIASTOLIC BLOOD PRESSURE: 86 MMHG | OXYGEN SATURATION: 99 % | HEART RATE: 94 BPM | RESPIRATION RATE: 16 BRPM | BODY MASS INDEX: 45 KG/M2

## 2024-04-21 DIAGNOSIS — J02.9 SORE THROAT: ICD-10-CM

## 2024-04-21 DIAGNOSIS — H66.001 NON-RECURRENT ACUTE SUPPURATIVE OTITIS MEDIA OF RIGHT EAR WITHOUT SPONTANEOUS RUPTURE OF TYMPANIC MEMBRANE: Primary | ICD-10-CM

## 2024-04-21 RX ORDER — AZITHROMYCIN 250 MG/1
TABLET, FILM COATED ORAL
Qty: 6 TABLET | Refills: 0 | Status: SHIPPED | OUTPATIENT
Start: 2024-04-21 | End: 2024-04-25

## 2024-04-21 NOTE — PROGRESS NOTES
CHIEF COMPLAINT:     Chief Complaint   Patient presents with    Sore Throat     Sx beginning of February - ST on and off, productive cough, runny nose, nasal congestion, fever (this past Wednesday high of 101.5), chills, sinus pressure, bilat ear pressure (L is worse)  Denies SOB, chest congestion, chest pain  No Covid test was done  OTC DayQuil       HPI:   Betty Lopez is a 53 year old adult who presents to clinic with symptoms of sore throat. Patient has had for 2 months. Symptoms have been  intermittent  since onset.  Patient reports following associated symptoms: congestion, ear pain, cough, fever (resolved) . denies stomach upset. denies rash. Patient denies strep pharyngitis exposure.  Treatments tried: DayQuil, claritin without relief.     Current Outpatient Medications   Medication Sig Dispense Refill    azithromycin 250 MG Oral Tab Take 2 tablets (500 mg total) by mouth daily for 1 day, THEN 1 tablet (250 mg total) daily for 4 days. 6 tablet 0    amphetamine-dextroamphetamine (ADDERALL) 10 MG Oral Tab Take 1 tablet (10 mg total) by mouth 2 (two) times daily as needed. 60 tablet 0    hydrOXYzine 25 MG Oral Tab Take 0.5-1 tablets (12.5-25 mg total) by mouth 3 (three) times daily as needed for Anxiety (or insomnia). 90 tablet 0    traZODone 50 MG Oral Tab Take 1 tablet (50 mg total) by mouth nightly as needed for Sleep. 30 tablet 2    venlafaxine ER (EFFEXOR XR) 37.5 MG Oral Capsule SR 24 Hr Take 1 cap PO daily with 150mg cap (187.5mg daily) 90 capsule 0    venlafaxine  MG Oral Capsule SR 24 Hr Take 1 cap PO daily with 37.5mg cap (187.5mg daily) 90 capsule 0    Galcanezumab-gnlm (EMGALITY) 120 MG/ML Subcutaneous Solution Auto-injector Inject 1 mL into the skin every 30 (thirty) days. 1 mL 3    Omeprazole 40 MG Oral Capsule Delayed Release Take 1 capsule (40 mg total) by mouth every morning before breakfast. 90 capsule 1    ondansetron 4 MG Oral Tablet Dispersible Take 1 tablet (4 mg total) by mouth  every 8 (eight) hours as needed for Nausea. 15 tablet 0    Rimegepant Sulfate (NURTEC) 75 MG Oral Tablet Dispersible Take 75 mg by mouth as needed. Take one tablet at onset of migraine.  Maximum dose in 24 hours is 1 tablet (75mg). 8 tablet 11    lisinopril 10 MG Oral Tab Take 1 tablet (10 mg total) by mouth nightly. 90 tablet 3    ATORVASTATIN 20 MG Oral Tab TAKE 1 TABLET(20 MG) BY MOUTH EVERY NIGHT 90 tablet 3    Multiple Vitamins-Iron (MULTI-DAY PLUS IRON) Oral Tab Take by mouth.      Meloxicam 15 MG Oral Tab Take 1 tablet (15 mg total) by mouth daily. 30 tablet 0    Melatonin 10 MG Oral Cap Take by mouth.      loratadine 10 MG Oral Tab Take 1 tablet (10 mg total) by mouth daily.        Past Medical History:    Anxiety    Death of a sister    Anxiety state    Calculus of kidney    Carpal tunnel syndrome    Depression    Essential hypertension    High blood pressure    High cholesterol    Hx of motion sickness    riding in cars    Hyperalgia    Hyperlipidemia    Knee pain, bilateral    Migraine headache    Migraines    Osteoarthritis    Sleep apnea    Tremor    possible side-effect of medications    Visual impairment    Readers      Past Surgical History:   Procedure Laterality Date    Anesthesia for;  procedures      EAB in 's     Colonoscopy N/A 2023    Procedure: COLONOSCOPY;  Surgeon: Naomi Garcia MD;  Location: [facility] ENDOSCOPY    Hernia surgery      Hysterectomy  2021    PROCEDURE:  Supracervical total abdominal hysterectomy and bilateral salpingo-oophorectomy.    Incisional hernia repair N/A 2022    Lap to open    Oophorectomy  2021    PROCEDURE:  Supracervical total abdominal hysterectomy and bilateral salpingo-oophorectomy.    Other surgical history  1976    surgery done as a child for bladder issue     Total abdominal hysterectomy N/A 2021    Procedure: supracervical total abdominal hysterectomy, bilateral salpingo, oophorectomy;  Surgeon: Ariana Walker MD;   Location: [facility] MAIN OR      Family History   Problem Relation Age of Onset    Heart Disorder Father     Heart Disorder Mother         Cardio Aortic aneurysm    Depression Mother         Never diagnosed,     Hypertension Mother     Migraines Mother     Breast Cancer Sister 35    Dementia Maternal Grandmother         Also Alzheimer's disease    Dementia Paternal Grandmother         Also Alzheimer's disease    Anxiety Sister     Depression Sister     Migraines Sister     Depression Sister     Migraines Sister     Traumatic brain injury Sister       Social History     Socioeconomic History    Marital status: Single   Tobacco Use    Smoking status: Former     Current packs/day: 0.00     Average packs/day: 0.5 packs/day for 13.0 years (6.5 ttl pk-yrs)     Types: Cigarettes     Start date: 1989     Quit date: 2002     Years since quittin.4    Smokeless tobacco: Never    Tobacco comments:     It wasn't consistent or daily.   Vaping Use    Vaping status: Never Used   Substance and Sexual Activity    Alcohol use: Not Currently     Comment: not since last month in November    Drug use: Not Currently     Types: Cannabis     Comment: Never again, cannabis gives me joint pain.    Sexual activity: Not Currently   Other Topics Concern    Blood Transfusions No    Special Diet Yes     Comment: Dash Diet         REVIEW OF SYSTEMS:   GENERAL HEALTH: See HPI  SKIN: See HPI  HEENT: See HPI  RESPIRATORY: denies shortness of breath, or wheezing  CARDIOVASCULAR: denies chest pain, palpitations   GI: denies abdominal pain, constipation and diarrhea  NEURO: denies dizziness or lightheadedness    EXAM:   /86   Pulse 94   Temp 98.6 °F (37 °C)   Resp 16   Ht 5' 3\" (1.6 m)   LMP 2021   SpO2 99%   BMI 44.57 kg/m²   GENERAL: well developed, well nourished,in no apparent distress  SKIN: no rashes,no suspicious lesions  HEAD: atraumatic, normocephalic  EYES: conjunctiva clear, EOM intact  EARS: R TM  erythematous, mild bulging. L TM gray, no bulging, landmarks intact.   NOSE: nostrils patent, no exudates, nasal mucosa pink and noninflamed  THROAT: oral mucosa pink, moist. Posterior pharynx is not erythematous and injected. no exudates. Tonsils 0/4.  Breath not malodorous. No uvular deviation. No drooling.  NECK: supple  LUNGS: clear to auscultation bilaterally, no wheezes or rhonchi. Breathing is non labored.  CARDIO: RRR without murmur  EXTREMITIES: no cyanosis, clubbing or edema  LYMPH: left anterior cervical. no submandibular lymphadenopathy.  No posterior cervical or occipital lymphadenopathy.    Lab review  No results found for this or any previous visit (from the past 24 hour(s)).      ASSESSMENT AND PLAN:   Betty Lopez is a 53 year old adult who presents with sore throat symptoms that are consistent with:    ASSESSMENT:  Encounter Diagnoses   Name Primary?    Non-recurrent acute suppurative otitis media of right ear without spontaneous rupture of tympanic membrane Yes    Sore throat        PLAN: Meds as below.  Comfort care instructions as listed in Patient Instructions    Meds & Refills for this Visit:  Requested Prescriptions     Signed Prescriptions Disp Refills    azithromycin 250 MG Oral Tab 6 tablet 0     Sig: Take 2 tablets (500 mg total) by mouth daily for 1 day, THEN 1 tablet (250 mg total) daily for 4 days.        Meds as listed above.  Comfort measures as described in Patient Instructions.    Risks, benefits, complications and side effects of meds discussed with patient.     OTC Tylenol/Motrin prn.   Push fluids- warm or cool liquids, whichever is soothing for patient  If antibiotics prescribed, change tooth brush after on medication for 48 hours  Warm salt water gargles 2 times per day for at least 3 days.    Do not share utensils or drinks with anyone.    The patient indicates understanding of these issues and agrees to the plan.  The patient is asked to contact their PCP in 3 days if  not better or fever greater than or equal to 100.4 persists for 72 hours. Seek immediate care if symptoms are worsening.     Patient Instructions     Self-Care for Sore Throats  Sore throats happen for many reasons, such as colds, allergies, cigarette smoke, air pollution, and infections caused by viruses or bacteria. In any case, your throat becomes red and sore. Your goal for self-care is to ease your discomfort while giving your throat a chance to heal.   Moisten and soothe your throat    Tips include the following:  Try a sip of water first thing after waking up.  Keep your throat moist by drinking 6 or more glasses of clear liquids every day.  Run a cool-air humidifier in your room overnight.  Stay away from cigarette smoke.   Check the air quality index, if air pollution gives you a sore throat. On high pollution days, try to limit outdoor time.  Suck on throat lozenges, cough drops, hard candy, ice chips, or frozen fruit-juice bars. Use the sugar-free versions if your diet or medical condition needs them.  Gargle to ease irritation  Gargling every hour or 2 can ease irritation. Try gargling with 1 of these solutions:   1/4 teaspoon of salt in 1/2 cup of warm water  An over-the-counter anesthetic gargle  Use medicine for more relief  Over-the-counter medicine can reduce sore throat symptoms. Ask your pharmacist if you have questions about which medicine to use. To prevent possible medicine interactions, let the pharmacist know what medicines you take. To decrease symptoms:   Ease pain with anesthetic sprays. Remember, never give aspirin to anyone 18 or younger to prevent a serious condition called Reye syndrome. Don't take aspirin if you are already taking blood thinners.   For sore throats caused by allergies, try antihistamines to block the allergic reaction.  Unless a sore throat is caused by a bacterial infection, antibiotics won’t help you.   Prevent future sore throats  Prevention tips include:  Stop  smoking or reduce contact with secondhand smoke. Smoke irritates the tender throat lining.  Limit contact with pets and with allergy-causing substances, such as pollen and mold.  Wash your hands often when you’re around someone with a sore throat or cold. This will keep viruses or bacteria from spreading.  Limit outdoor time when air pollution is bad.  Don’t strain your vocal cords.  When to call your healthcare provider  Contact your healthcare provider if any of the following occur:   Fever of 100.4°F (38.0°C) or higher, or as directed by your healthcare provider  White spots on the throat  Difficult or painful swallowing  A skin rash  Recent exposure to someone else with strep bacteria  Severe hoarseness and swollen glands in the neck or jaw  Call 911  Call 911 if any of the following occur:   Trouble breathing or catching your breath  Drooling and problems swallowing  Wheezing  Unable to talk  Feeling dizzy or faint  Feeling of doom    Jessi last reviewed this educational content on 3/1/2022  © 3946-2491 [employer]. All rights reserved. This information is not intended as a substitute for professional medical care. Always follow your healthcare professional's instructions.

## 2024-04-21 NOTE — PATIENT INSTRUCTIONS
Self-Care for Sore Throats  Sore throats happen for many reasons, such as colds, allergies, cigarette smoke, air pollution, and infections caused by viruses or bacteria. In any case, your throat becomes red and sore. Your goal for self-care is to ease your discomfort while giving your throat a chance to heal.   Moisten and soothe your throat    Tips include the following:  Try a sip of water first thing after waking up.  Keep your throat moist by drinking 6 or more glasses of clear liquids every day.  Run a cool-air humidifier in your room overnight.  Stay away from cigarette smoke.   Check the air quality index, if air pollution gives you a sore throat. On high pollution days, try to limit outdoor time.  Suck on throat lozenges, cough drops, hard candy, ice chips, or frozen fruit-juice bars. Use the sugar-free versions if your diet or medical condition needs them.  Gargle to ease irritation  Gargling every hour or 2 can ease irritation. Try gargling with 1 of these solutions:   1/4 teaspoon of salt in 1/2 cup of warm water  An over-the-counter anesthetic gargle  Use medicine for more relief  Over-the-counter medicine can reduce sore throat symptoms. Ask your pharmacist if you have questions about which medicine to use. To prevent possible medicine interactions, let the pharmacist know what medicines you take. To decrease symptoms:   Ease pain with anesthetic sprays. Remember, never give aspirin to anyone 18 or younger to prevent a serious condition called Reye syndrome. Don't take aspirin if you are already taking blood thinners.   For sore throats caused by allergies, try antihistamines to block the allergic reaction.  Unless a sore throat is caused by a bacterial infection, antibiotics won’t help you.   Prevent future sore throats  Prevention tips include:  Stop smoking or reduce contact with secondhand smoke. Smoke irritates the tender throat lining.  Limit contact with pets and with allergy-causing  substances, such as pollen and mold.  Wash your hands often when you’re around someone with a sore throat or cold. This will keep viruses or bacteria from spreading.  Limit outdoor time when air pollution is bad.  Don’t strain your vocal cords.  When to call your healthcare provider  Contact your healthcare provider if any of the following occur:   Fever of 100.4°F (38.0°C) or higher, or as directed by your healthcare provider  White spots on the throat  Difficult or painful swallowing  A skin rash  Recent exposure to someone else with strep bacteria  Severe hoarseness and swollen glands in the neck or jaw  Call 911  Call 911 if any of the following occur:   Trouble breathing or catching your breath  Drooling and problems swallowing  Wheezing  Unable to talk  Feeling dizzy or faint  Feeling of doom    Jessi last reviewed this educational content on 3/1/2022  © 6494-5089 [employer]. All rights reserved. This information is not intended as a substitute for professional medical care. Always follow your healthcare professional's instructions.

## 2024-04-26 ENCOUNTER — PATIENT MESSAGE (OUTPATIENT)
Dept: FAMILY MEDICINE CLINIC | Facility: CLINIC | Age: 54
End: 2024-04-26

## 2024-04-26 DIAGNOSIS — H92.09 OTALGIA, UNSPECIFIED LATERALITY: Primary | ICD-10-CM

## 2024-04-26 DIAGNOSIS — R07.0 THROAT PAIN IN ADULT: ICD-10-CM

## 2024-04-30 ENCOUNTER — OFFICE VISIT (OUTPATIENT)
Facility: LOCATION | Age: 54
End: 2024-04-30
Payer: COMMERCIAL

## 2024-04-30 VITALS — WEIGHT: 261 LBS | HEIGHT: 63 IN | BODY MASS INDEX: 46.25 KG/M2

## 2024-04-30 DIAGNOSIS — H69.91 DYSFUNCTION OF RIGHT EUSTACHIAN TUBE: ICD-10-CM

## 2024-04-30 DIAGNOSIS — J30.9 ALLERGIC RHINITIS, UNSPECIFIED SEASONALITY, UNSPECIFIED TRIGGER: Primary | ICD-10-CM

## 2024-04-30 DIAGNOSIS — M26.621 ARTHRALGIA OF RIGHT TEMPOROMANDIBULAR JOINT: ICD-10-CM

## 2024-04-30 DIAGNOSIS — H92.01 RIGHT EAR PAIN: ICD-10-CM

## 2024-04-30 PROCEDURE — 3008F BODY MASS INDEX DOCD: CPT | Performed by: STUDENT IN AN ORGANIZED HEALTH CARE EDUCATION/TRAINING PROGRAM

## 2024-04-30 PROCEDURE — 99203 OFFICE O/P NEW LOW 30 MIN: CPT | Performed by: STUDENT IN AN ORGANIZED HEALTH CARE EDUCATION/TRAINING PROGRAM

## 2024-04-30 PROCEDURE — 92504 EAR MICROSCOPY EXAMINATION: CPT | Performed by: STUDENT IN AN ORGANIZED HEALTH CARE EDUCATION/TRAINING PROGRAM

## 2024-04-30 RX ORDER — FLUTICASONE PROPIONATE 50 MCG
1 SPRAY, SUSPENSION (ML) NASAL 2 TIMES DAILY
Qty: 48 G | Refills: 1 | Status: SHIPPED | OUTPATIENT
Start: 2024-04-30

## 2024-04-30 RX ORDER — PREDNISONE 5 MG/1
TABLET ORAL
Qty: 35 TABLET | Refills: 0 | Status: SHIPPED | OUTPATIENT
Start: 2024-04-30 | End: 2024-05-15

## 2024-04-30 RX ORDER — FLUTICASONE PROPIONATE 50 MCG
1 SPRAY, SUSPENSION (ML) NASAL 2 TIMES DAILY
Qty: 16 G | Refills: 1 | Status: SHIPPED | OUTPATIENT
Start: 2024-04-30 | End: 2024-04-30

## 2024-04-30 RX ORDER — AZELASTINE 1 MG/ML
1 SPRAY, METERED NASAL 2 TIMES DAILY
Qty: 30 ML | Refills: 1 | Status: SHIPPED | OUTPATIENT
Start: 2024-04-30

## 2024-04-30 NOTE — TELEPHONE ENCOUNTER
Added 1 refill to the Fluticasone that the pharmacy didn't have but Dr. Carlson's order did include.

## 2024-04-30 NOTE — PROGRESS NOTES
[place]  OTOLARYNGOLOGY - HEAD & NECK SURGERY    2024     Reason for Consultation:   Right ear pain    History of Present Illness:   Patient is a pleasant 53 year old adult who is being seen for right-sided ear pain and pressure which initially started approximately 3 months ago.  Started with upper respiratory infection which she states has not fully gone away.  She still has residual cough as well as right ear pressure and pain although the pressure has improved.  She does notice that she has had worsening of her symptoms of seasonal allergies in the last few years.  She has not been allergy tested in the past.  She has been taking oral antihistamine for this.  She has not tried any nasal steroid or nasal antihistamine.  She also has a history of chronic migraine and tinnitus.  She has not had a formal audiogram for this.    Past Medical History  Past Medical History:    Anxiety    Death of a sister    Anxiety state    Calculus of kidney    Carpal tunnel syndrome    Depression    Essential hypertension    High blood pressure    High cholesterol    Hx of motion sickness    riding in cars    Hyperalgia    Hyperlipidemia    Knee pain, bilateral    Migraine headache    Migraines    Osteoarthritis    Sleep apnea    Tremor    possible side-effect of medications    Visual impairment    Readers       Past Surgical History  Past Surgical History:   Procedure Laterality Date    Anesthesia for;  procedures      EAB in 's     Colonoscopy N/A 2023    Procedure: COLONOSCOPY;  Surgeon: Naomi Garcia MD;  Location: [facility] ENDOSCOPY    Hernia surgery      Hysterectomy  2021    PROCEDURE:  Supracervical total abdominal hysterectomy and bilateral salpingo-oophorectomy.    Incisional hernia repair N/A 2022    Lap to open    Oophorectomy  2021    PROCEDURE:  Supracervical total abdominal hysterectomy and bilateral salpingo-oophorectomy.    Other surgical history  1976    surgery done as a child  for bladder issue     Total abdominal hysterectomy N/A 2021    Procedure: supracervical total abdominal hysterectomy, bilateral salpingo, oophorectomy;  Surgeon: Ariana Walker MD;  Location: [facility] MAIN OR       Family History  Family History   Problem Relation Age of Onset    Heart Disorder Father     Heart Disorder Mother         Cardio Aortic aneurysm    Depression Mother         Never diagnosed,     Hypertension Mother     Migraines Mother     Breast Cancer Sister 35    Dementia Maternal Grandmother         Also Alzheimer's disease    Dementia Paternal Grandmother         Also Alzheimer's disease    Anxiety Sister     Depression Sister     Migraines Sister     Depression Sister     Migraines Sister     Traumatic brain injury Sister        Social History  Pediatric History   Patient Parents    Not on file     Other Topics Concern     Service Not Asked    Blood Transfusions No    Caffeine Concern Not Asked    Occupational Exposure Not Asked    Hobby Hazards Not Asked    Sleep Concern Not Asked    Stress Concern Not Asked    Weight Concern Not Asked    Special Diet Yes     Comment: Dash Diet    Back Care Not Asked    Exercise Not Asked    Bike Helmet Not Asked    Seat Belt Not Asked    Self-Exams Not Asked   Social History Narrative    Not on file           Current Medications:  Current Outpatient Medications   Medication Sig Dispense Refill    predniSONE 5 MG Oral Tab Take 4 tablets (20 mg total) by mouth daily for 5 days, THEN 2 tablets (10 mg total) daily for 5 days, THEN 1 tablet (5 mg total) daily for 5 days. 35 tablet 0    fluticasone propionate 50 MCG/ACT Nasal Suspension 1 spray by Nasal route 2 (two) times daily. 16 g 1    azelastine 0.1 % Nasal Solution 1 spray by Nasal route 2 (two) times daily. 30 mL 1    amphetamine-dextroamphetamine (ADDERALL) 10 MG Oral Tab Take 1 tablet (10 mg total) by mouth 2 (two) times daily as needed. 60 tablet 0    hydrOXYzine 25 MG Oral Tab Take 0.5-1  tablets (12.5-25 mg total) by mouth 3 (three) times daily as needed for Anxiety (or insomnia). 90 tablet 0    traZODone 50 MG Oral Tab Take 1 tablet (50 mg total) by mouth nightly as needed for Sleep. 30 tablet 2    venlafaxine ER (EFFEXOR XR) 37.5 MG Oral Capsule SR 24 Hr Take 1 cap PO daily with 150mg cap (187.5mg daily) 90 capsule 0    venlafaxine  MG Oral Capsule SR 24 Hr Take 1 cap PO daily with 37.5mg cap (187.5mg daily) 90 capsule 0    Galcanezumab-gnlm (EMGALITY) 120 MG/ML Subcutaneous Solution Auto-injector Inject 1 mL into the skin every 30 (thirty) days. 1 mL 3    Omeprazole 40 MG Oral Capsule Delayed Release Take 1 capsule (40 mg total) by mouth every morning before breakfast. 90 capsule 1    ondansetron 4 MG Oral Tablet Dispersible Take 1 tablet (4 mg total) by mouth every 8 (eight) hours as needed for Nausea. 15 tablet 0    Rimegepant Sulfate (NURTEC) 75 MG Oral Tablet Dispersible Take 75 mg by mouth as needed. Take one tablet at onset of migraine.  Maximum dose in 24 hours is 1 tablet (75mg). 8 tablet 11    lisinopril 10 MG Oral Tab Take 1 tablet (10 mg total) by mouth nightly. 90 tablet 3    ATORVASTATIN 20 MG Oral Tab TAKE 1 TABLET(20 MG) BY MOUTH EVERY NIGHT 90 tablet 3    Multiple Vitamins-Iron (MULTI-DAY PLUS IRON) Oral Tab Take by mouth.      Meloxicam 15 MG Oral Tab Take 1 tablet (15 mg total) by mouth daily. 30 tablet 0    Melatonin 10 MG Oral Cap Take by mouth.      loratadine 10 MG Oral Tab Take 1 tablet (10 mg total) by mouth daily.         Allergies  Allergies   Allergen Reactions    Marijuana (Cannabis Sativa) PAIN    Penicillin G NAUSEA AND VOMITING       Review of Systems:   A comprehensive 10 point review of systems was completed.  Pertinent positives and negatives noted in the the HPI.    Physical Exam:   Height 5' 3\" (1.6 m), weight 261 lb (118.4 kg), last menstrual period 03/02/2021.    GENERAL: No acute distress, Comfortable appearing  FACE: HB 1/6, Normal Animation  HEAD:  Normocephalic  EYES: EOMI, pupils equil  EARS: Bilateral Auricles Symmetric  NOSE: Nares patent bilaterally, inferior turbinates are mildly enlarged  ORAL CAVITY: Tongue mobile, Oropharynx clear, Floor of mouth clear, Posterior oropharynx normal  NECK: No palpable lymphadenopathy, thyroid not palpable, nontender    Canals:  Right: Clear  Left: Clear    Tympanic Membranes:  Right: Normal tympanic membrane, with no retraction, middle ear space clear  Left: Normal tympanic membrane. with no retraction middle ear space clear    TM Visualized Method:   Right TM examined via otomicroscopy.   Left TM examined via otomicroscopy.        Results:     Laboratory Data:  Lab Results   Component Value Date    WBC 6.3 04/07/2023    HGB 14.9 04/07/2023    HCT 44.9 04/07/2023    .0 04/07/2023    CREATSERUM 0.75 04/07/2023    BUN 15 04/07/2023     04/07/2023    K 4.2 04/07/2023     04/07/2023    CO2 26.0 04/07/2023     (H) 04/07/2023    CA 9.7 04/07/2023    ALB 4.54 05/31/2023    ALKPHO 83 04/07/2023    TP 7.4 05/31/2023    AST 15 04/07/2023    ALT 41 04/07/2023    TSH 1.740 04/13/2023    CRP <0.40 12/07/2023    CK 40 05/31/2023    B12 289 05/31/2023         Imaging:  No results found.      Impression:   Allergic rhinitis  Otalgia, right  TMJ arthralgia, right  Chronic migraine  Tinnitus    Recommendations:  I will treat her with Flonase, azelastine, and 2 week taper of prednisone  She would also like to get allergy tested  She will return to see me if she has any persistent problems with her ear    Thank you for allowing me to participate in the care of your patient.    Estevan Carlson,    Otolaryngology/Rhinology, Sinus, and Endoscopic Skull Base Surgery  [facility] [address] [place] [id number]  Phone [phone]  Fax [phone]  4/30/2024  8:35 AM  4/30/2024

## 2024-05-23 NOTE — TELEPHONE ENCOUNTER
Please review. Protocol Failed; No Protocol    Requested Prescriptions   Pending Prescriptions Disp Refills    LISINOPRIL 10 MG Oral Tab [Pharmacy Med Name: LISINOPRIL 10MG TABLETS] 90 tablet 3     Sig: TAKE 1 TABLET(10 MG) BY MOUTH EVERY NIGHT       Hypertension Medications Protocol Failed - 5/21/2024  5:48 AM        Failed - CMP or BMP in past 12 months        Failed - EGFRCR or GFRNAA > 50     GFR Evaluation            Passed - Last BP reading less than 140/90     BP Readings from Last 1 Encounters:   04/21/24 131/86               Passed - In person appointment or virtual visit in the past 12 mos or appointment in next 3 mos     Recent Outpatient Visits              3 weeks ago Allergic rhinitis, unspecified seasonality, unspecified trigger    [facility], [facility] Estevan Carlson DO    Office Visit    1 month ago Non-recurrent acute suppurative otitis media of right ear without spontaneous rupture of tympanic membrane    [facility], [facility] Martín Londono APRN    Office Visit    2 months ago     [facility] Beth Prater, PT    Office Visit    3 months ago     [facility] Beth Prater, PT    Office Visit    3 months ago     [facility] Beth Prater, PT    Office Visit          Future Appointments         Provider Department Appt Notes    In 3 weeks Robin Cunningham MD [facility]     In 2 months Jan Tatum MD [facility] worsening allergies, ear pain/infection, saw ENT who referred to Allergy                           Future Appointments         Provider Department Appt Notes    In 3 weeks Robin Cunningham MD [facility]     In 2 months Jan Tatum MD [facility]  worsening allergies, ear pain/infection, saw ENT who referred to Allergy          Recent Outpatient Visits              3 weeks ago Allergic rhinitis, unspecified seasonality, unspecified trigger    [facility], [facility] Estevan Carlson DO    Office Visit    1 month ago Non-recurrent acute suppurative otitis media of right ear without spontaneous rupture of tympanic membrane    [facility], [facility], [facility] Martín Londono APRN    Office Visit    2 months ago     [facility] Beth Prater, PT    Office Visit    3 months ago     [facility] Beth Prater, PT    Office Visit    3 months ago     [facility] Beth Prater, PT    Office Visit

## 2024-05-24 RX ORDER — LISINOPRIL 10 MG/1
10 TABLET ORAL NIGHTLY
Qty: 90 TABLET | Refills: 3 | Status: SHIPPED | OUTPATIENT
Start: 2024-05-24

## 2024-06-18 ENCOUNTER — TELEPHONE (OUTPATIENT)
Dept: FAMILY MEDICINE CLINIC | Facility: CLINIC | Age: 54
End: 2024-06-18

## 2024-06-18 ENCOUNTER — LAB ENCOUNTER (OUTPATIENT)
Dept: LAB | Age: 54
End: 2024-06-18
Attending: FAMILY MEDICINE

## 2024-06-18 ENCOUNTER — OFFICE VISIT (OUTPATIENT)
Dept: FAMILY MEDICINE CLINIC | Facility: CLINIC | Age: 54
End: 2024-06-18

## 2024-06-18 VITALS
WEIGHT: 260 LBS | SYSTOLIC BLOOD PRESSURE: 130 MMHG | DIASTOLIC BLOOD PRESSURE: 86 MMHG | RESPIRATION RATE: 18 BRPM | BODY MASS INDEX: 46 KG/M2 | HEART RATE: 71 BPM | OXYGEN SATURATION: 98 %

## 2024-06-18 DIAGNOSIS — E66.01 CLASS 3 SEVERE OBESITY WITHOUT SERIOUS COMORBIDITY WITH BODY MASS INDEX (BMI) OF 45.0 TO 49.9 IN ADULT, UNSPECIFIED OBESITY TYPE (HCC): ICD-10-CM

## 2024-06-18 DIAGNOSIS — Z00.00 ANNUAL PHYSICAL EXAM: Primary | ICD-10-CM

## 2024-06-18 DIAGNOSIS — Z00.00 ANNUAL PHYSICAL EXAM: ICD-10-CM

## 2024-06-18 DIAGNOSIS — I10 ESSENTIAL HYPERTENSION: ICD-10-CM

## 2024-06-18 DIAGNOSIS — Z12.31 VISIT FOR SCREENING MAMMOGRAM: ICD-10-CM

## 2024-06-18 LAB
ALBUMIN SERPL-MCNC: 4.9 G/DL (ref 3.2–4.8)
ALBUMIN/GLOB SERPL: 1.7 {RATIO} (ref 1–2)
ALP LIVER SERPL-CCNC: 76 U/L
ALT SERPL-CCNC: 37 U/L
ANION GAP SERPL CALC-SCNC: 9 MMOL/L (ref 0–18)
AST SERPL-CCNC: 29 U/L (ref ?–34)
BILIRUB SERPL-MCNC: 0.6 MG/DL (ref 0.3–1.2)
BUN BLD-MCNC: 14 MG/DL (ref 9–23)
BUN/CREAT SERPL: 17.5 (ref 10–20)
CALCIUM BLD-MCNC: 10.1 MG/DL (ref 8.7–10.4)
CHLORIDE SERPL-SCNC: 108 MMOL/L (ref 98–112)
CHOLEST SERPL-MCNC: 227 MG/DL (ref ?–200)
CO2 SERPL-SCNC: 25 MMOL/L (ref 21–32)
CREAT BLD-MCNC: 0.8 MG/DL
DEPRECATED RDW RBC AUTO: 43.1 FL (ref 35.1–46.3)
EGFRCR SERPLBLD CKD-EPI 2021: 89 ML/MIN/1.73M2 (ref 60–?)
ERYTHROCYTE [DISTWIDTH] IN BLOOD BY AUTOMATED COUNT: 12.8 % (ref 11–15)
FASTING PATIENT LIPID ANSWER: NO
FASTING STATUS PATIENT QL REPORTED: NO
GLOBULIN PLAS-MCNC: 2.9 G/DL (ref 2–3.5)
GLUCOSE BLD-MCNC: 100 MG/DL (ref 70–99)
HCT VFR BLD AUTO: 47.2 %
HDLC SERPL-MCNC: 49 MG/DL (ref 40–59)
HGB BLD-MCNC: 15.9 G/DL
LDLC SERPL CALC-MCNC: 143 MG/DL (ref ?–100)
MCH RBC QN AUTO: 31.1 PG (ref 26–34)
MCHC RBC AUTO-ENTMCNC: 33.7 G/DL (ref 31–37)
MCV RBC AUTO: 92.4 FL
NONHDLC SERPL-MCNC: 178 MG/DL (ref ?–130)
OSMOLALITY SERPL CALC.SUM OF ELEC: 295 MOSM/KG (ref 275–295)
PLATELET # BLD AUTO: 249 10(3)UL (ref 150–450)
POTASSIUM SERPL-SCNC: 4.2 MMOL/L (ref 3.5–5.1)
PROT SERPL-MCNC: 7.8 G/DL (ref 5.7–8.2)
RBC # BLD AUTO: 5.11 X10(6)UL
SODIUM SERPL-SCNC: 142 MMOL/L (ref 136–145)
TRIGL SERPL-MCNC: 193 MG/DL (ref 30–149)
TSI SER-ACNC: 2.05 MIU/ML (ref 0.55–4.78)
VLDLC SERPL CALC-MCNC: 36 MG/DL (ref 0–30)
WBC # BLD AUTO: 6.7 X10(3) UL (ref 4–11)

## 2024-06-18 PROCEDURE — 80061 LIPID PANEL: CPT

## 2024-06-18 PROCEDURE — 85027 COMPLETE CBC AUTOMATED: CPT

## 2024-06-18 PROCEDURE — 99396 PREV VISIT EST AGE 40-64: CPT | Performed by: FAMILY MEDICINE

## 2024-06-18 PROCEDURE — 84443 ASSAY THYROID STIM HORMONE: CPT

## 2024-06-18 PROCEDURE — 3075F SYST BP GE 130 - 139MM HG: CPT | Performed by: FAMILY MEDICINE

## 2024-06-18 PROCEDURE — 3079F DIAST BP 80-89 MM HG: CPT | Performed by: FAMILY MEDICINE

## 2024-06-18 PROCEDURE — 80053 COMPREHEN METABOLIC PANEL: CPT

## 2024-06-18 PROCEDURE — 36415 COLL VENOUS BLD VENIPUNCTURE: CPT

## 2024-06-18 RX ORDER — FERROUS SULFATE 325(65) MG
325 TABLET, DELAYED RELEASE (ENTERIC COATED) ORAL
COMMUNITY

## 2024-06-18 RX ORDER — TIRZEPATIDE 2.5 MG/.5ML
2.5 INJECTION, SOLUTION SUBCUTANEOUS WEEKLY
Qty: 2 ML | Refills: 0 | Status: SHIPPED | OUTPATIENT
Start: 2024-06-18 | End: 2024-07-10

## 2024-06-18 NOTE — TELEPHONE ENCOUNTER
Prior auth completed for Zepbound    Approved    Prior authorization approved  Payer: [employer] Case ID: [id number]    [phone]    [phone]  Note from payer: CaseId:[id number];Status:Approved;Review Type:Prior Auth;Coverage Start Date:05/19/2024;Coverage End Date:02/13/2025;  Approval Details    Authorized from May 19, 2024 to February 13, 2025  Electronic appeal: Not supported  View History

## 2024-06-18 NOTE — PROGRESS NOTES
HPI:    Betty Lopez is a 53 year old adult presents to clinic for annual physical exam.   Hx of DJD of both knees, sees orthopedics. States that her surgeon is willing to consider a replacement if she loses weight. She has significantly changed her diet, swims a few times a week for exercise. Feels that she keeps gaining weight, she is aware this is a side effect of a few or her chronic medications and is frustrated.   Normal appetite, balanced diet. No changes in BMs, urination. Some improvement in sleep - about 6 -7 hours/night.         HISTORY:  Past Medical History:    Anxiety    Death of a sister    Anxiety state    Calculus of kidney    Carpal tunnel syndrome    Depression    Essential hypertension    High blood pressure    High cholesterol    Hx of motion sickness    riding in cars    Hyperalgia    Hyperlipidemia    Knee pain, bilateral    Migraine headache    Migraines    Osteoarthritis    Sleep apnea    Tremor    possible side-effect of medications    Visual impairment    Readers      Past Surgical History:   Procedure Laterality Date    Anesthesia for;  procedures      EAB in 's     Colonoscopy N/A 2023    Procedure: COLONOSCOPY;  Surgeon: Naomi Garcia MD;  Location: [facility] ENDOSCOPY    Hernia surgery      Hysterectomy  2021    PROCEDURE:  Supracervical total abdominal hysterectomy and bilateral salpingo-oophorectomy.    Incisional hernia repair N/A 2022    Lap to open    Oophorectomy  2021    PROCEDURE:  Supracervical total abdominal hysterectomy and bilateral salpingo-oophorectomy.    Other surgical history  1976    surgery done as a child for bladder issue     Total abdominal hysterectomy N/A 2021    Procedure: supracervical total abdominal hysterectomy, bilateral salpingo, oophorectomy;  Surgeon: Ariana Walker MD;  Location: [facility] MAIN OR      Family History   Problem Relation Age of Onset    Heart Disorder Father     Heart Disorder Mother         Cardio  Aortic aneurysm    Depression Mother         Never diagnosed,     Hypertension Mother     Migraines Mother     Breast Cancer Sister 35    Dementia Maternal Grandmother         Also Alzheimer's disease    Dementia Paternal Grandmother         Also Alzheimer's disease    Anxiety Sister     Depression Sister     Migraines Sister     Depression Sister     Migraines Sister     Traumatic brain injury Sister       Social History:   Social History     Socioeconomic History    Marital status: Single   Tobacco Use    Smoking status: Former     Current packs/day: 0.00     Average packs/day: 0.5 packs/day for 13.0 years (6.5 ttl pk-yrs)     Types: Cigarettes     Start date: 1989     Quit date: 2002     Years since quittin.6    Smokeless tobacco: Never    Tobacco comments:     It wasn't consistent or daily.   Vaping Use    Vaping status: Never Used   Substance and Sexual Activity    Alcohol use: Not Currently     Comment: not since last month in November    Drug use: Not Currently     Types: Cannabis     Comment: Never again, cannabis gives me joint pain.    Sexual activity: Not Currently   Other Topics Concern    Blood Transfusions No    Special Diet Yes     Comment: Dash Diet        Medications (Active prior to today's visit):  Current Outpatient Medications   Medication Sig Dispense Refill    ferrous sulfate 325 (65 FE) MG Oral Tab EC Take 1 tablet (325 mg total) by mouth daily with breakfast.      Tirzepatide-Weight Management (ZEPBOUND) 2.5 MG/0.5ML Subcutaneous Solution Auto-injector Inject 2.5 mg into the skin once a week for 4 doses. 2 mL 0    lisinopril 10 MG Oral Tab Take 1 tablet (10 mg total) by mouth nightly. 90 tablet 3    azelastine 0.1 % Nasal Solution 1 spray by Nasal route 2 (two) times daily. 30 mL 1    fluticasone propionate 50 MCG/ACT Nasal Suspension SHAKE LIQUID AND USE 1 SPRAY IN EACH NOSTRIL TWICE DAILY 48 g 1    amphetamine-dextroamphetamine (ADDERALL) 10 MG Oral Tab Take 1 tablet  (10 mg total) by mouth 2 (two) times daily as needed. 60 tablet 0    hydrOXYzine 25 MG Oral Tab Take 0.5-1 tablets (12.5-25 mg total) by mouth 3 (three) times daily as needed for Anxiety (or insomnia). 90 tablet 0    traZODone 50 MG Oral Tab Take 1 tablet (50 mg total) by mouth nightly as needed for Sleep. 30 tablet 2    venlafaxine ER (EFFEXOR XR) 37.5 MG Oral Capsule SR 24 Hr Take 1 cap PO daily with 150mg cap (187.5mg daily) 90 capsule 0    venlafaxine  MG Oral Capsule SR 24 Hr Take 1 cap PO daily with 37.5mg cap (187.5mg daily) 90 capsule 0    Galcanezumab-gnlm (EMGALITY) 120 MG/ML Subcutaneous Solution Auto-injector Inject 1 mL into the skin every 30 (thirty) days. 1 mL 3    Omeprazole 40 MG Oral Capsule Delayed Release Take 1 capsule (40 mg total) by mouth every morning before breakfast. 90 capsule 1    ATORVASTATIN 20 MG Oral Tab TAKE 1 TABLET(20 MG) BY MOUTH EVERY NIGHT 90 tablet 3    Multiple Vitamins-Iron (MULTI-DAY PLUS IRON) Oral Tab Take by mouth.      Meloxicam 15 MG Oral Tab Take 1 tablet (15 mg total) by mouth daily. 30 tablet 0    Melatonin 10 MG Oral Cap Take by mouth.      loratadine 10 MG Oral Tab Take 1 tablet (10 mg total) by mouth daily.         Allergies:  Allergies   Allergen Reactions    Marijuana (Cannabis Sativa) PAIN    Penicillin G NAUSEA AND VOMITING         Depression Screening (PHQ-2/PHQ-9): Over the LAST 2 WEEKS   Little interest or pleasure in doing things: Not at all    Feeling down, depressed, or hopeless: Not at all    PHQ-2 SCORE: 0           ROS:   Review of Systems   All other systems reviewed and are negative.      PHYSICAL EXAM:     Vitals:    06/18/24 1335   BP: 130/86   BP Location: Left arm   Patient Position: Sitting   Cuff Size: large   Pulse: 71   Resp: 18   SpO2: 98%   Weight: 260 lb (117.9 kg)     Physical Exam  Vitals reviewed.   Constitutional:       General: Betty is not in acute distress.  HENT:      Head: Normocephalic and atraumatic.      Right Ear:  Tympanic membrane, ear canal and external ear normal.      Left Ear: Tympanic membrane, ear canal and external ear normal.      Nose: Nose normal.      Mouth/Throat:      Pharynx: Uvula midline.   Eyes:      Conjunctiva/sclera: Conjunctivae normal.      Pupils: Pupils are equal, round, and reactive to light.   Neck:      Thyroid: No thyromegaly.   Cardiovascular:      Rate and Rhythm: Normal rate and regular rhythm.      Heart sounds: Normal heart sounds. No murmur heard.  Pulmonary:      Effort: Pulmonary effort is normal. No respiratory distress.      Breath sounds: Normal breath sounds. No wheezing or rales.   Chest:   Breasts:     Right: Normal. No swelling, bleeding, inverted nipple, mass, nipple discharge, skin change or tenderness.      Left: Normal. No swelling, bleeding, inverted nipple, mass, nipple discharge, skin change or tenderness.   Abdominal:      General: Bowel sounds are normal. There is no distension.      Palpations: Abdomen is soft.      Tenderness: There is no abdominal tenderness. There is no guarding or rebound.   Musculoskeletal:      Cervical back: Normal range of motion and neck supple.   Lymphadenopathy:      Cervical: No cervical adenopathy.   Neurological:      Mental Status: Betty is alert.         ASSESSMENT/PLAN:   (Z00.00) Annual physical exam  (primary encounter diagnosis)  Plan: Lipid Panel [E], Comp Metabolic Panel (14) [E],        TSH W Reflex To Free T4 [E], CBC, Platelet, No         Differential [E]  - Vaccines UTD   - Reinforced healthy diet, lifestyle, and exercise.  - Past Medical/Social/Family histories reviewed  - Regular dental visits recommended   - Regular eye exams recommended     Health Maintenance   Topic Date Due    Mammogram  05/10/2024      Health Maintenance   Topic Date Due    Colorectal Cancer Screening  09/29/2033          Follow up in 1 year or sooner if needed      (Z12.31) Visit for screening mammogram  Plan: [facility] 2D+3D SCREENING BILAT          (CPT=77067/37359)      (I10) Essential hypertension  Plan:   -Blood pressure at goal, to continue current management.  Continued lifestyle modifications encouraged.  Follow-up in 6 months or sooner if needed.      (E66.01,  Z68.42) Class 3 severe obesity without serious comorbidity with body mass index (BMI) of 45.0 to 49.9 in adult, unspecified obesity type (HCC)  Plan: Tirzepatide-Weight Management (ZEPBOUND) 2.5         MG/0.5ML Subcutaneous Solution Auto-injector  - Patient has been trying to lose weight with diet/exercises for 2 years. Many barriers with Hx of DJD of both knees, chronic migraines. Options discussed. Phentermine not ideal - history of HTN and she takes adderall per psychiatrist. GLP-1 agonists discussed.  Denies personal or family hx medullary thyroid CA, endocrine neoplasia syndrome, suicidal ideation. No renal impairment, severe GI disease, diabetes, pancreatitis risks noted.  Zepbound to pharmacy. Instructions/side effects discussed. Follow up in 1 month or sooner if needed              Responsible party/patient verbalized understanding of information discussed. No barriers to learning observed.            Orders This Visit:  Orders Placed This Encounter   Procedures    Lipid Panel [E]    Comp Metabolic Panel (14) [E]    TSH W Reflex To Free T4 [E]    CBC, Platelet, No Differential [E]       Meds This Visit:  Requested Prescriptions     Signed Prescriptions Disp Refills    Tirzepatide-Weight Management (ZEPBOUND) 2.5 MG/0.5ML Subcutaneous Solution Auto-injector 2 mL 0     Sig: Inject 2.5 mg into the skin once a week for 4 doses.       Imaging & Referrals:  [facility] 2D+3D SCREENING BILAT (CPT=77067/02883)     Chaperone offered at visit today.     The 21st Century cures Act makes medical notes like these available to patients in the interest of transparency.  However, be advised that this is a medical document.  It is intended as peer to peer communication.  It is written in medical language  and may contain abbreviations or verbiage that are unfamiliar.  It may appear blunt or direct.  Medical documents are intended to carry relevant information, facts as evident, and the clinical opinion of the practitioner.      This note was created by Dragon voice recognition. Errors in content may be related to improper recognition by the system; efforts to review and correct have been done but errors may still exist. Please contact me with any questions.       6/18/2024  Robin Cunningham MD

## 2024-06-20 DIAGNOSIS — G43.101 MIGRAINE WITH AURA AND WITH STATUS MIGRAINOSUS, NOT INTRACTABLE: ICD-10-CM

## 2024-06-20 RX ORDER — GALCANEZUMAB 120 MG/ML
1 INJECTION, SOLUTION SUBCUTANEOUS
Qty: 1 ML | Refills: 0 | Status: SHIPPED | OUTPATIENT
Start: 2024-06-20

## 2024-06-20 NOTE — TELEPHONE ENCOUNTER
Requested Prescriptions     Pending Prescriptions Disp Refills    EMGALITY 120 MG/ML Subcutaneous Solution Auto-injector [Pharmacy Med Name: EMGALITY 120MG/ML AUTO INJECTOR 1ML] 1 mL 3     Sig: ADMINISTER 1 ML UNDER THE SKIN EVERY 30 DAYS        LOV: 6/16/23  NOV: none    Last refill/[place]: 2/21/24

## 2024-06-22 ENCOUNTER — HOSPITAL ENCOUNTER (EMERGENCY)
Facility: HOSPITAL | Age: 54
Discharge: HOME | End: 2024-06-22
Attending: EMERGENCY MEDICINE

## 2024-06-22 VITALS
RESPIRATION RATE: 18 BRPM | OXYGEN SATURATION: 95 % | SYSTOLIC BLOOD PRESSURE: 153 MMHG | HEART RATE: 87 BPM | HEIGHT: 63 IN | WEIGHT: 260 LBS | TEMPERATURE: 98 F | BODY MASS INDEX: 46.07 KG/M2 | DIASTOLIC BLOOD PRESSURE: 91 MMHG

## 2024-06-22 DIAGNOSIS — R11.2 NAUSEA AND VOMITING, UNSPECIFIED VOMITING TYPE: Primary | ICD-10-CM

## 2024-06-22 LAB
ALBUMIN SERPL-MCNC: 4.9 G/DL (ref 3.2–4.8)
ALBUMIN/GLOB SERPL: 1.7 {RATIO} (ref 1–2)
ALP LIVER SERPL-CCNC: 72 U/L
ALT SERPL-CCNC: 28 U/L
ANION GAP SERPL CALC-SCNC: 10 MMOL/L (ref 0–18)
AST SERPL-CCNC: 23 U/L (ref ?–34)
BASOPHILS # BLD AUTO: 0.08 X10(3) UL (ref 0–0.2)
BASOPHILS NFR BLD AUTO: 0.6 %
BILIRUB SERPL-MCNC: 0.6 MG/DL (ref 0.3–1.2)
BILIRUB UR QL: NEGATIVE
BUN BLD-MCNC: 19 MG/DL (ref 9–23)
BUN/CREAT SERPL: 17.8 (ref 10–20)
CALCIUM BLD-MCNC: 10.3 MG/DL (ref 8.7–10.4)
CHLORIDE SERPL-SCNC: 107 MMOL/L (ref 98–112)
CO2 SERPL-SCNC: 25 MMOL/L (ref 21–32)
CREAT BLD-MCNC: 1.07 MG/DL
DEPRECATED RDW RBC AUTO: 42.3 FL (ref 35.1–46.3)
EGFRCR SERPLBLD CKD-EPI 2021: 63 ML/MIN/1.73M2 (ref 60–?)
EOSINOPHIL # BLD AUTO: 0.07 X10(3) UL (ref 0–0.7)
EOSINOPHIL NFR BLD AUTO: 0.5 %
ERYTHROCYTE [DISTWIDTH] IN BLOOD BY AUTOMATED COUNT: 12.7 % (ref 11–15)
GLOBULIN PLAS-MCNC: 2.9 G/DL (ref 2–3.5)
GLUCOSE BLD-MCNC: 156 MG/DL (ref 70–99)
GLUCOSE UR-MCNC: NORMAL MG/DL
HCT VFR BLD AUTO: 43.7 %
HGB BLD-MCNC: 15 G/DL
IMM GRANULOCYTES # BLD AUTO: 0.04 X10(3) UL (ref 0–1)
IMM GRANULOCYTES NFR BLD: 0.3 %
KETONES UR-MCNC: 40 MG/DL
LEUKOCYTE ESTERASE UR QL STRIP.AUTO: NEGATIVE
LIPASE SERPL-CCNC: 29 U/L (ref 13–75)
LYMPHOCYTES # BLD AUTO: 1.55 X10(3) UL (ref 1–4)
LYMPHOCYTES NFR BLD AUTO: 11.3 %
MCH RBC QN AUTO: 31.2 PG (ref 26–34)
MCHC RBC AUTO-ENTMCNC: 34.3 G/DL (ref 31–37)
MCV RBC AUTO: 90.9 FL
MONOCYTES # BLD AUTO: 0.63 X10(3) UL (ref 0.1–1)
MONOCYTES NFR BLD AUTO: 4.6 %
NEUTROPHILS # BLD AUTO: 11.39 X10 (3) UL (ref 1.5–7.7)
NEUTROPHILS # BLD AUTO: 11.39 X10(3) UL (ref 1.5–7.7)
NEUTROPHILS NFR BLD AUTO: 82.7 %
NITRITE UR QL STRIP.AUTO: NEGATIVE
OSMOLALITY SERPL CALC.SUM OF ELEC: 299 MOSM/KG (ref 275–295)
PH UR: 8.5 [PH] (ref 5–8)
PLATELET # BLD AUTO: 262 10(3)UL (ref 150–450)
POTASSIUM SERPL-SCNC: 3.9 MMOL/L (ref 3.5–5.1)
PROT SERPL-MCNC: 7.8 G/DL (ref 5.7–8.2)
PROT UR-MCNC: NEGATIVE MG/DL
RBC # BLD AUTO: 4.81 X10(6)UL
SODIUM SERPL-SCNC: 142 MMOL/L (ref 136–145)
SP GR UR STRIP: 1.02 (ref 1–1.03)
UROBILINOGEN UR STRIP-ACNC: NORMAL
WBC # BLD AUTO: 13.8 X10(3) UL (ref 4–11)

## 2024-06-22 PROCEDURE — 80053 COMPREHEN METABOLIC PANEL: CPT | Performed by: EMERGENCY MEDICINE

## 2024-06-22 PROCEDURE — 96374 THER/PROPH/DIAG INJ IV PUSH: CPT

## 2024-06-22 PROCEDURE — 99284 EMERGENCY DEPT VISIT MOD MDM: CPT

## 2024-06-22 PROCEDURE — 96361 HYDRATE IV INFUSION ADD-ON: CPT

## 2024-06-22 PROCEDURE — 96375 TX/PRO/DX INJ NEW DRUG ADDON: CPT

## 2024-06-22 PROCEDURE — 85025 COMPLETE CBC W/AUTO DIFF WBC: CPT | Performed by: EMERGENCY MEDICINE

## 2024-06-22 PROCEDURE — S0028 INJECTION, FAMOTIDINE, 20 MG: HCPCS | Performed by: EMERGENCY MEDICINE

## 2024-06-22 PROCEDURE — 81001 URINALYSIS AUTO W/SCOPE: CPT | Performed by: EMERGENCY MEDICINE

## 2024-06-22 PROCEDURE — 83690 ASSAY OF LIPASE: CPT | Performed by: EMERGENCY MEDICINE

## 2024-06-22 RX ORDER — KETOROLAC TROMETHAMINE 30 MG/ML
30 INJECTION, SOLUTION INTRAMUSCULAR; INTRAVENOUS ONCE
Status: COMPLETED | OUTPATIENT
Start: 2024-06-22 | End: 2024-06-22

## 2024-06-22 RX ORDER — ONDANSETRON 2 MG/ML
4 INJECTION INTRAMUSCULAR; INTRAVENOUS ONCE
Status: COMPLETED | OUTPATIENT
Start: 2024-06-22 | End: 2024-06-22

## 2024-06-22 RX ORDER — FAMOTIDINE 10 MG/ML
20 INJECTION, SOLUTION INTRAVENOUS ONCE
Status: COMPLETED | OUTPATIENT
Start: 2024-06-22 | End: 2024-06-22

## 2024-06-22 RX ORDER — METOCLOPRAMIDE HYDROCHLORIDE 5 MG/ML
10 INJECTION INTRAMUSCULAR; INTRAVENOUS ONCE
Status: COMPLETED | OUTPATIENT
Start: 2024-06-22 | End: 2024-06-22

## 2024-06-22 RX ORDER — METOCLOPRAMIDE 10 MG/1
10 TABLET ORAL 3 TIMES DAILY PRN
Qty: 20 TABLET | Refills: 0 | Status: SHIPPED | OUTPATIENT
Start: 2024-06-22 | End: 2024-07-22

## 2024-06-22 RX ORDER — ATORVASTATIN CALCIUM 20 MG/1
20 TABLET, FILM COATED ORAL NIGHTLY
Qty: 90 TABLET | Refills: 3 | Status: SHIPPED | OUTPATIENT
Start: 2024-06-22

## 2024-06-22 NOTE — ED PROVIDER NOTES
Patient Seen in: [facility] Emergency Department      History     Chief Complaint   Patient presents with    Abdomen/Flank Pain    Nausea/Vomiting/Diarrhea     Stated Complaint: Nausea / vomiting    Subjective:   HPI        Objective:   Past Medical History:    Anxiety    Death of a sister    Anxiety state    Calculus of kidney    Carpal tunnel syndrome    Depression    Essential hypertension    High blood pressure    High cholesterol    Hx of motion sickness    riding in cars    Hyperalgia    Hyperlipidemia    Knee pain, bilateral    Migraine headache    Migraines    Osteoarthritis    Sleep apnea    Tremor    possible side-effect of medications    Visual impairment    Readers              Past Surgical History:   Procedure Laterality Date    Anesthesia for;  procedures      EAB in      Colonoscopy N/A 2023    Procedure: COLONOSCOPY;  Surgeon: Naomi Garcia MD;  Location: [facility] ENDOSCOPY    Hernia surgery      Hysterectomy  2021    PROCEDURE:  Supracervical total abdominal hysterectomy and bilateral salpingo-oophorectomy.    Incisional hernia repair N/A 2022    Lap to open    Oophorectomy  2021    PROCEDURE:  Supracervical total abdominal hysterectomy and bilateral salpingo-oophorectomy.    Other surgical history  1976    surgery done as a child for bladder issue     Total abdominal hysterectomy N/A 2021    Procedure: supracervical total abdominal hysterectomy, bilateral salpingo, oophorectomy;  Surgeon: Ariana Walker MD;  Location: [facility] MAIN OR                Social History     Socioeconomic History    Marital status: Single   Tobacco Use    Smoking status: Former     Current packs/day: 0.00     Average packs/day: 0.5 packs/day for 13.0 years (6.5 ttl pk-yrs)     Types: Cigarettes     Start date: 1989     Quit date: 2002     Years since quittin.6    Smokeless tobacco: Never    Tobacco comments:     It wasn't consistent or daily.   Vaping Use     Vaping status: Never Used   Substance and Sexual Activity    Alcohol use: Not Currently     Comment: not since last month in November    Drug use: Not Currently     Types: Cannabis     Comment: Never again, cannabis gives me joint pain.    Sexual activity: Not Currently   Other Topics Concern    Blood Transfusions No    Special Diet Yes     Comment: Dash Diet              Review of Systems    Positive for stated complaint: Nausea / vomiting  Other systems are as noted in HPI.  Constitutional and vital signs reviewed.      All other systems reviewed and negative except as noted above.    Physical Exam     ED Triage Vitals   BP 06/22/24 0552 (!) 165/81   Pulse 06/22/24 0552 110   Resp 06/22/24 0552 18   Temp 06/22/24 0555 97.6 °F (36.4 °C)   Temp src 06/22/24 0555 Oral   SpO2 06/22/24 0552 97 %   O2 Device 06/22/24 0552 None (Room air)       Current Vitals:   Vital Signs  BP: 157/76  Pulse: 93  Resp: 22  Temp: 97.6 °F (36.4 °C)  Temp src: Oral  MAP (mmHg): 99    Oxygen Therapy  SpO2: 100 %  O2 Device: None (Room air)            Physical Exam          ED Course     Labs Reviewed   CBC W/ DIFFERENTIAL - Abnormal; Notable for the following components:       Result Value    WBC 13.8 (*)     Neutrophil Absolute Prelim 11.39 (*)     Neutrophil Absolute 11.39 (*)     All other components within normal limits   CBC WITH DIFFERENTIAL WITH PLATELET    Narrative:     The following orders were created for panel order CBC With Differential With Platelet.  Procedure                               Abnormality         Status                     ---------                               -----------         ------                     CBC W/ DIFFERENTIAL[[id number]]          Abnormal            Final result                 Please view results for these tests on the individual orders.   COMP METABOLIC PANEL (14)   URINALYSIS WITH CULTURE REFLEX   LIPASE   RAINBOW DRAW BLUE   RAINBOW DRAW GOLD                      MDM      53-year-old  biologic female history of hypertension presents today with nausea and vomiting.  Patient states she took the first dose of prescribed Zepp bound for obesity yesterday.  Starting around midnight, she has been experiencing intractable nausea, vomiting, and abdominal cramping.  She had a small amount of blood in her vomit.  Denies diarrhea, fever/chills, shortness of breath, or significant abdominal pain.    On exam, vitals reassuring, uncomfortable but nontoxic-appearing, moist mucous membranes, abdomen soft and relatively nontender    Differential: Medication side effect, gastroenteritis, gastritis, pancreatitis    Plan to treat symptomatically, check labs, and DC if symptom control achieved.                                   MDM    Disposition and Plan     Clinical Impression:  No diagnosis found.     Disposition:  There is no disposition on file for this visit.  There is no disposition time on file for this visit.    Follow-up:  No follow-up provider specified.        Medications Prescribed:  Current Discharge Medication List

## 2024-06-22 NOTE — ED INITIAL ASSESSMENT (HPI)
Pt presents with c/o vomiting since last night.  Pt states that she just started on a weight loss shot and this is one of the side effects.

## 2024-06-22 NOTE — ED QUICK NOTES
Pt tolerated p.o. challenge ice chips  No vomiting since last medicated  Reports improved pain  Will follow up with doctor regarding discontinuing medication which may have caused this  Pt is in no distress at time of discharge

## 2024-06-23 NOTE — TELEPHONE ENCOUNTER
Refill Passed Per Protocol    Requested Prescriptions   Pending Prescriptions Disp Refills    ATORVASTATIN 20 MG Oral Tab [Pharmacy Med Name: ATORVASTATIN 20MG TABLETS] 90 tablet 3     Sig: TAKE 1 TABLET(20 MG) BY MOUTH EVERY NIGHT       Cholesterol Medication Protocol Passed - 6/20/2024  5:49 AM        Passed - ALT < 80     Lab Results   Component Value Date    ALT 28 06/22/2024             Passed - ALT resulted within past year        Passed - Lipid panel within past 12 months     Lab Results   Component Value Date    CHOLEST 227 (H) 06/18/2024    TRIG 193 (H) 06/18/2024    HDL 49 06/18/2024     (H) 06/18/2024    VLDL 36 (H) 06/18/2024    NONHDLC 178 (H) 06/18/2024             Passed - In person appointment or virtual visit in the past 12 mos or appointment in next 3 mos     Recent Outpatient Visits              4 days ago Annual physical exam    [facility] Robin Cunningham MD    Office Visit    1 month ago Allergic rhinitis, unspecified seasonality, unspecified trigger    [facility], [facility] Estevan Carlson DO    Office Visit    2 months ago Non-recurrent acute suppurative otitis media of right ear without spontaneous rupture of tympanic membrane    [facility] Martín Londono APRN    Office Visit    3 months ago     [facility] Beth Prater, PT    Office Visit    4 months ago     [facility] Beth Prater, PT    Office Visit          Future Appointments         Provider Department Appt Notes    In 1 month Jan Tatum MD [facility] worsening allergies, ear pain/infection, saw ENT who referred to Allergy                         Future Appointments         Provider Department Appt Notes    In 1 month Jan Tatum MD [facility]  [place] worsening allergies, ear pain/infection, saw ENT who referred to Allergy          Recent Outpatient Visits              4 days ago Annual physical exam    [facility], [facility] Robin Cunningham MD    Office Visit    1 month ago Allergic rhinitis, unspecified seasonality, unspecified trigger    [facility], [facility] Estevan Carlson DO    Office Visit    2 months ago Non-recurrent acute suppurative otitis media of right ear without spontaneous rupture of tympanic membrane    [facility], [facility], [facility] Martín Londono APRN    Office Visit    3 months ago     [facility] Beht Prater, PT    Office Visit    4 months ago     [facility] Beth Prater, PT    Office Visit

## 2024-06-25 RX ORDER — OMEPRAZOLE 40 MG/1
40 CAPSULE, DELAYED RELEASE ORAL
Qty: 90 CAPSULE | Refills: 1 | Status: SHIPPED | OUTPATIENT
Start: 2024-06-25

## 2024-06-25 NOTE — TELEPHONE ENCOUNTER
Last Office Visit: 12/5/2023     Last Refill: 12/5/2023    Previous Procedure: Colonoscopy 9/29/2023    Requested Prescriptions     Pending Prescriptions Disp Refills    OMEPRAZOLE 40 MG Oral Capsule Delayed Release [Pharmacy Med Name: OMEPRAZOLE 40MG CAPSULES] 90 capsule 1     Sig: TAKE 1 CAPSULE(40 MG) BY MOUTH EVERY MORNING BEFORE BREAKFAST

## 2024-06-27 DIAGNOSIS — G43.101 MIGRAINE WITH AURA AND WITH STATUS MIGRAINOSUS, NOT INTRACTABLE: ICD-10-CM

## 2024-06-27 NOTE — TELEPHONE ENCOUNTER
Pt called asking for her EMGALITY 120 MG/ML Subcutaneous Solution Auto-injector to be sent to the [facility] on file in [place]. Pt has upcoming apt scheduled with provider for 9/27.

## 2024-06-27 NOTE — TELEPHONE ENCOUNTER
LOV 06/16/23   NOV 09/27/24    Refill request for pt EMGALITY 120 MG/ML Subcutaneous Solution Auto-injector, reviewed by RN and routed to provider for review.    Medication Quantity Refills Start End   EMGALITY 120 MG/ML Subcutaneous Solution Auto-injector 1 mL 0 6/20/2024 --   Sig:   ADMINISTER 1 ML UNDER THE SKIN EVERY 30 DAYS     Route:   Subcutaneous     Order #:   [id number]

## 2024-07-02 RX ORDER — GALCANEZUMAB 120 MG/ML
1 INJECTION, SOLUTION SUBCUTANEOUS
Qty: 1 ML | Refills: 2 | Status: SHIPPED | OUTPATIENT
Start: 2024-07-02

## 2024-07-29 ENCOUNTER — OFFICE VISIT (OUTPATIENT)
Dept: ALLERGY | Facility: CLINIC | Age: 54
End: 2024-07-29
Payer: COMMERCIAL

## 2024-07-29 ENCOUNTER — APPOINTMENT (OUTPATIENT)
Dept: ALLERGY | Facility: CLINIC | Age: 54
End: 2024-07-29

## 2024-07-29 ENCOUNTER — LAB ENCOUNTER (OUTPATIENT)
Dept: LAB | Age: 54
End: 2024-07-29
Attending: ALLERGY & IMMUNOLOGY
Payer: COMMERCIAL

## 2024-07-29 VITALS — HEART RATE: 91 BPM | OXYGEN SATURATION: 96 % | DIASTOLIC BLOOD PRESSURE: 94 MMHG | SYSTOLIC BLOOD PRESSURE: 147 MMHG

## 2024-07-29 DIAGNOSIS — H10.10 SEASONAL AND PERENNIAL ALLERGIC RHINOCONJUNCTIVITIS: Primary | ICD-10-CM

## 2024-07-29 DIAGNOSIS — Z88.0 PENICILLIN ALLERGY: ICD-10-CM

## 2024-07-29 DIAGNOSIS — J30.89 SEASONAL AND PERENNIAL ALLERGIC RHINOCONJUNCTIVITIS: Primary | ICD-10-CM

## 2024-07-29 DIAGNOSIS — Z23 NEED FOR COVID-19 VACCINE: ICD-10-CM

## 2024-07-29 DIAGNOSIS — Z23 FLU VACCINE NEED: ICD-10-CM

## 2024-07-29 DIAGNOSIS — J30.2 SEASONAL AND PERENNIAL ALLERGIC RHINOCONJUNCTIVITIS: Primary | ICD-10-CM

## 2024-07-29 PROCEDURE — 86003 ALLG SPEC IGE CRUDE XTRC EA: CPT | Performed by: ALLERGY & IMMUNOLOGY

## 2024-07-29 PROCEDURE — 36415 COLL VENOUS BLD VENIPUNCTURE: CPT

## 2024-07-29 PROCEDURE — 95004 PERQ TESTS W/ALRGNC XTRCS: CPT | Performed by: ALLERGY & IMMUNOLOGY

## 2024-07-29 PROCEDURE — 86003 ALLG SPEC IGE CRUDE XTRC EA: CPT

## 2024-07-29 PROCEDURE — 82785 ASSAY OF IGE: CPT | Performed by: ALLERGY & IMMUNOLOGY

## 2024-07-29 RX ORDER — LEVOCETIRIZINE DIHYDROCHLORIDE 5 MG/1
5 TABLET, FILM COATED ORAL EVERY EVENING
Qty: 90 TABLET | Refills: 1 | Status: SHIPPED | OUTPATIENT
Start: 2024-07-29

## 2024-07-29 NOTE — PATIENT INSTRUCTIONS
#1 allergic rhinitis  See above skin testing to screen for allergic triggers  Reviewed avoidance measures and potential treatment option of therapy  Trial of Xyzal, levocetirizine 5 mg once a day as a nonsedating antihistamine  Flonase 2 sprays per nostril once a day.  Best used daily to prevent symptoms and may take 3 to 5 days to take full effect  Defers considering Singulair at this time due to FDA warning  Considers nasal saline sinus rinses    #2 adverse reaction to penicillin  Check serum IgE to penicillin to further evaluate.  If testing is negative may consider trying medications as symptoms included nausea and vomiting.  Reviewed potential side effect with medication as well    #3 flu vaccine in the fall recommended    #4 COVID vaccines recommended.  Recommend booster.  Reviewed I do not have the booster my office.  Please check with local pharmacy most recent booster available is in September 2023         Orders This Visit:  Orders Placed This Encounter   Procedures    Penicillin V    Penicillin G       Meds This Visit:  Requested Prescriptions     Signed Prescriptions Disp Refills    levocetirizine 5 MG Oral Tab 90 tablet 1     Sig: Take 1 tablet (5 mg total) by mouth every evening.

## 2024-07-31 ENCOUNTER — TELEPHONE (OUTPATIENT)
Dept: ALLERGY | Facility: CLINIC | Age: 54
End: 2024-07-31

## 2024-07-31 LAB
A ALTERNATA IGE QN: <0.1 KUA/L (ref ?–0.1)
A FUMIGATUS IGE QN: <0.1 KUA/L (ref ?–0.1)
AMER SYCAMORE IGE QN: <0.1 KUA/L (ref ?–0.1)
BERMUDA GRASS IGE QN: <0.1 KUA/L (ref ?–0.1)
BOXELDER IGE QN: <0.1 KUA/L (ref ?–0.1)
C HERBARUM IGE QN: <0.1 KUA/L (ref ?–0.1)
CALIF WALNUT IGE QN: <0.1 KUA/L (ref ?–0.1)
CAT DANDER IGE QN: 0.13 KUA/L (ref ?–0.1)
CMN PIGWEED IGE QN: <0.1 KUA/L (ref ?–0.1)
COMMON RAGWEED IGE QN: <0.1 KUA/L (ref ?–0.1)
COTTONWOOD IGE QN: <0.1 KUA/L (ref ?–0.1)
D FARINAE IGE QN: <0.1 KUA/L (ref ?–0.1)
D PTERONYSS IGE QN: <0.1 KUA/L (ref ?–0.1)
DOG DANDER IGE QN: 0.2 KUA/L (ref ?–0.1)
IGE SERPL-ACNC: 19.3 KU/L (ref 2–214)
M RACEMOSUS IGE QN: <0.1 KUA/L (ref ?–0.1)
MARSH ELDER IGE QN: <0.1 KUA/L (ref ?–0.1)
MOUSE EPITH IGE QN: 0.14 KUA/L (ref ?–0.1)
MT JUNIPER IGE QN: <0.1 KUA/L (ref ?–0.1)
P NOTATUM IGE QN: <0.1 KUA/L (ref ?–0.1)
PECAN/HICK TREE IGE QN: <0.1 KUA/L (ref ?–0.1)
ROACH IGE QN: <0.1 KUA/L (ref ?–0.1)
SALTWORT IGE QN: <0.1 KUA/L (ref ?–0.1)
SILVER BIRCH IGE QN: <0.1 KUA/L (ref ?–0.1)
TIMOTHY IGE QN: <0.1 KUA/L (ref ?–0.1)
WHITE ASH IGE QN: <0.1 KUA/L (ref ?–0.1)
WHITE ELM IGE QN: <0.1 KUA/L (ref ?–0.1)
WHITE MULBERRY IGE QN: <0.1 KUA/L (ref ?–0.1)
WHITE OAK IGE QN: <0.1 KUA/L (ref ?–0.1)

## 2024-07-31 NOTE — TELEPHONE ENCOUNTER
----- Message from Jan Tatum sent at 7/31/2024  7:34 AM CDT -----    Please contact patient with serum IgE testing to environmental allergens.  Patient did show low-grade IgE production to cat dog and mouse.  Total IgE level was normal.    Pen G and Pen V still in process.

## 2024-08-05 LAB
C001-IGE PENICILLOYL G: <0.1 KU/L
C002-IGE PENICILLOYL V: <0.1 KU/L

## 2024-08-06 NOTE — TELEPHONE ENCOUNTER
----- Message from Jan Tatum sent at 8/5/2024 10:27 AM CDT -----    Please contact patient with negative serum IgE testing to penicillin V and penicillin G.  Recommend oral challenge to further evaluate.

## 2024-08-07 NOTE — TELEPHONE ENCOUNTER
Patient returning phone call  Verified patient's name and birthday  RN went over test results listed below  Patient verbalized understanding  Scheduled oral challenge to penicillin on 12/9/24  Patient to call office about a week before appointment to request medication to bring to appointment   Will stop antihistamines 5 days before the challenge - patient is on hydroxyzine

## 2024-08-14 ENCOUNTER — HOSPITAL ENCOUNTER (EMERGENCY)
Facility: HOSPITAL | Age: 54
Discharge: HOME | End: 2024-08-14
Attending: EMERGENCY MEDICINE
Payer: COMMERCIAL

## 2024-08-14 ENCOUNTER — APPOINTMENT (OUTPATIENT)
Dept: GENERAL RADIOLOGY | Facility: HOSPITAL | Age: 54
End: 2024-08-14
Payer: COMMERCIAL

## 2024-08-14 VITALS
DIASTOLIC BLOOD PRESSURE: 84 MMHG | HEART RATE: 93 BPM | RESPIRATION RATE: 18 BRPM | TEMPERATURE: 98 F | WEIGHT: 254 LBS | OXYGEN SATURATION: 96 % | BODY MASS INDEX: 45 KG/M2 | HEIGHT: 63 IN | SYSTOLIC BLOOD PRESSURE: 128 MMHG

## 2024-08-14 DIAGNOSIS — S83.92XA SPRAIN OF LEFT KNEE, UNSPECIFIED LIGAMENT, INITIAL ENCOUNTER: Primary | ICD-10-CM

## 2024-08-14 PROCEDURE — 73560 X-RAY EXAM OF KNEE 1 OR 2: CPT

## 2024-08-14 PROCEDURE — 99284 EMERGENCY DEPT VISIT MOD MDM: CPT

## 2024-08-14 PROCEDURE — 99283 EMERGENCY DEPT VISIT LOW MDM: CPT

## 2024-08-14 RX ORDER — HYDROCODONE BITARTRATE AND ACETAMINOPHEN 5; 325 MG/1; MG/1
2 TABLET ORAL ONCE
Status: COMPLETED | OUTPATIENT
Start: 2024-08-14 | End: 2024-08-14

## 2024-08-14 RX ORDER — HYDROCODONE BITARTRATE AND ACETAMINOPHEN 5; 325 MG/1; MG/1
1-2 TABLET ORAL EVERY 6 HOURS PRN
Qty: 20 TABLET | Refills: 0 | Status: SHIPPED | OUTPATIENT
Start: 2024-08-14

## 2024-08-14 NOTE — ED INITIAL ASSESSMENT (HPI)
Pt in wheel chair through triage c/o Thursday hurt L knee. Pt states hx of knee cap popping out of place. Today stood up from desk and got shooting pain in leg.

## 2024-08-15 NOTE — ED PROVIDER NOTES
Patient Seen in: [facility] Emergency Department    History     Chief Complaint   Patient presents with    Leg or Foot Injury       HPI    The patient presents to the ED complaining of left knee pain that she states started when she stood up from a desk.  She developed shooting pain in the knee while doing so and has had pain there since.  She denies any dislocation of the knee or direct injury to the knee.  History of arthritis in the knee.    History reviewed.   Past Medical History:    Anxiety    Death of a sister    Anxiety state    Calculus of kidney    Carpal tunnel syndrome    Depression    Essential hypertension    High blood pressure    High cholesterol    Hx of motion sickness    riding in cars    Hyperalgia    Hyperlipidemia    Knee pain, bilateral    Migraine headache    Migraines    Osteoarthritis    Sleep apnea    Tremor    possible side-effect of medications    Visual impairment    Readers       History reviewed.   Past Surgical History:   Procedure Laterality Date    Anesthesia for;  procedures      EAB in      Colonoscopy N/A 2023    Procedure: COLONOSCOPY;  Surgeon: Naomi Garcia MD;  Location: [facility] ENDOSCOPY    Hernia surgery      Hysterectomy  2021    PROCEDURE:  Supracervical total abdominal hysterectomy and bilateral salpingo-oophorectomy.    Incisional hernia repair N/A 2022    Lap to open    Oophorectomy  2021    PROCEDURE:  Supracervical total abdominal hysterectomy and bilateral salpingo-oophorectomy.    Other surgical history  1976    surgery done as a child for bladder issue     Total abdominal hysterectomy N/A 2021    Procedure: supracervical total abdominal hysterectomy, bilateral salpingo, oophorectomy;  Surgeon: Ariana Walker MD;  Location: [facility] MAIN OR         Medications :  (Not in a hospital admission)       Family History   Problem Relation Age of Onset    Heart Disorder Father     Heart Disorder Mother         Cardio Aortic  aneurysm    Depression Mother         Never diagnosed,     Hypertension Mother     Migraines Mother     Breast Cancer Sister 35    Dementia Maternal Grandmother         Also Alzheimer's disease    Dementia Paternal Grandmother         Also Alzheimer's disease    Anxiety Sister     Depression Sister     Migraines Sister     Depression Sister     Migraines Sister     Traumatic brain injury Sister        Smoking Status:   Social History     Socioeconomic History    Marital status: Single   Tobacco Use    Smoking status: Former     Current packs/day: 0.00     Average packs/day: 0.5 packs/day for 13.0 years (6.5 ttl pk-yrs)     Types: Cigarettes     Start date: 1989     Quit date: 2002     Years since quittin.7    Smokeless tobacco: Never    Tobacco comments:     It wasn't consistent or daily.   Vaping Use    Vaping status: Never Used   Substance and Sexual Activity    Alcohol use: Not Currently     Comment: not since last month in November    Drug use: Not Currently     Types: Cannabis     Comment: Never again, cannabis gives me joint pain.    Sexual activity: Not Currently   Other Topics Concern    Blood Transfusions No    Special Diet Yes     Comment: Dash Diet       Constitutional and vital signs reviewed.      Social History and Family History elements reviewed from today, pertinent positives to the presenting problem noted.    Physical Exam     ED Triage Vitals [24 1415]   BP (!) 158/103   Pulse 102   Resp 18   Temp 98.1 °F (36.7 °C)   Temp src Oral   SpO2 96 %   O2 Device None (Room air)       All measures to prevent infection transmission during my interaction with the patient were taken. Handwashing was performed prior to and after the exam.  Stethoscope and any equipment used during my examination was cleaned with super sani-cloth germicidal wipes following the exam.     Physical Exam  Vitals and nursing note reviewed.   Constitutional:       General: Betty is not in acute  distress.     Appearance: Betty is well-developed. Betty is obese.   HENT:      Head: Normocephalic and atraumatic.   Eyes:      General:         Right eye: No discharge.         Left eye: No discharge.      Conjunctiva/sclera: Conjunctivae normal.   Neck:      Trachea: No tracheal deviation.   Cardiovascular:      Rate and Rhythm: Normal rate.   Pulmonary:      Effort: Pulmonary effort is normal. No respiratory distress.      Breath sounds: No stridor.   Abdominal:      General: There is no distension.      Palpations: Abdomen is soft.   Musculoskeletal:         General: Tenderness present. No swelling, deformity or signs of injury.      Comments: This to the anterior aspect of the left knee without bony deformity or laxity.  Slightly decreased range of motion secondary to pain.   Skin:     General: Skin is warm and dry.   Neurological:      Mental Status: Betty is alert and oriented to person, place, and time.   Psychiatric:         Mood and Affect: Mood normal.         Behavior: Behavior normal.         ED Course      Labs Reviewed - No data to display    As Interpreted by me    Imaging Results Available and Reviewed while in ED: XR KNEE (1 OR 2 VIEWS), LEFT (CPT=73560)    Result Date: 8/14/2024  CONCLUSION: Severe tricompartmental osteoarthritis of the left knee.  Small left knee effusion.  No acute osseous abnormality.   Dictated by (CST): Durga Chacko MD on 8/14/2024 at 2:52 PM     Finalized by (CST): Durga Chacko MD on 8/14/2024 at 2:53 PM         ED Medications Administered:   Medications   HYDROcodone-acetaminophen (Norco) 5-325 MG per tab 2 tablet (2 tablets Oral Given 8/14/24 1539)         MDM     Vitals:    08/14/24 1415 08/14/24 1542   BP: (!) 158/103 128/84   Pulse: 102 93   Resp: 18 18   Temp: 98.1 °F (36.7 °C)    TempSrc: Oral    SpO2: 96% 96%   Weight: 115.2 kg    Height: 160 cm (5' 3\")      *I personally reviewed and interpreted all ED vitals.    Pulse Ox: 96%, Room air, Normal      Differential Diagnosis/ Diagnostic Considerations: Knee sprain, knee effusion, arthritis, other    Complicating Factors: The patient already has does not have any pertinent problems on file. to contribute to the complexity of this ED evaluation.    Medical Decision Making  Patient presents to the ED with left knee pain after getting up from her desk.  No direct injury or dislocation history.  X-ray shows severe osteoarthritis and a small effusion.  patient given pain meds in the ED and recommended RICE treatment at home.  Orthopedic follow-up recommended.    Problems Addressed:  Sprain of left knee, unspecified ligament, initial encounter: acute illness or injury    Amount and/or Complexity of Data Reviewed  Radiology: ordered and independent interpretation performed. Decision-making details documented in ED Course.     Details: I personally reviewed the patient's left knee x-ray images and noted no fracture    Risk  Prescription drug management.        Condition upon leaving the department: Stable    Disposition and Plan     Clinical Impression:  1. Sprain of left knee, unspecified ligament, initial encounter        Disposition:  Discharge    Follow-up:  Your orthopedic surgoen    Schedule an appointment as soon as possible for a visit in 3 day(s)        Medications Prescribed:  Discharge Medication List as of 8/14/2024  3:42 PM        START taking these medications    Details   HYDROcodone-acetaminophen 5-325 MG Oral Tab Take 1-2 tablets by mouth every 6 (six) hours as needed for Pain., Normal, Disp-20 tablet, R-0

## 2024-08-19 ENCOUNTER — NURSE TRIAGE (OUTPATIENT)
Dept: FAMILY MEDICINE CLINIC | Facility: CLINIC | Age: 54
End: 2024-08-19

## 2024-08-19 NOTE — TELEPHONE ENCOUNTER
Patient stated that she started having left knee pain on Wednesday 8/14/2024 and went to [place] [facility]. Was diagnosed with a sprain on top of her osteoarthritis. Patient has been using hydrocodone sparingly.  Has been swimming which seems to be helping to loosen up the knee.  Was using a cane but borrowed a friend's walker which has been working out better than the cane. Patient was not able to see the orthopedic till the end of September. Patient is still having the left knee pain. Not any worse. No other symptoms.  Patient wanted to schedule a follow up with Dr Cunningham. Also has paperwork that she needs to have filled out for work. Will bring the paperwork to the appointment or will send it through [employer]. Appointment made for 8/24/2024 at 12:30pm with Dr Cunningham in [place].       From: Betty Lopez  To: Robin Cunningham  Sent: 8/18/2024  4:02 PM CDT  Subject: ER Visit & Work Accommodations    Good Afternoon,    On Wednesday afternoon, I ended up in the ER for a sprained knee. There's a bit more to it (it's in my file) but basically, with the sprained knee on top of my severe osteoarthritis, I am in an extreme amount of pain and walking has become even more difficult. They prescribed me Hydrocodone/Acetaminophen but I'm using it sparingly and hope to not need it soon since it only takes the edge off, makes me feel yucky, and I cannot take my anti-anxiety meds while on it.  I am trying to get in to see my Orthopedic, Dr Anderson Cordero with [facility] [place], but the earliest he can see me is September 23.    Questions:  Could you prescribe me something non-narcotic but stronger than over the counter? (If not, no worries, I'll stick to taking Ibuprofen.)  Could you fill out a work accommodation form? If so, should I set up an appointment to see you or should I send it through [employer] [facility]?    Thank you,  JTobi

## 2024-08-20 NOTE — TELEPHONE ENCOUNTER
Spoke with patient. Verified name and . Informed patient of test results and recommendations per Dr. Tatum. Patient verbalizes understanding and states she has a Penicillin oral challenge scheduled for 24.    Informed patient to please contact our office 1 week prior to appointment to receive prescription for Penicillin and to please refrain form allergy medication 5 days prior to appointment. Patient verbalizes understanding.

## 2024-08-24 ENCOUNTER — OFFICE VISIT (OUTPATIENT)
Dept: FAMILY MEDICINE CLINIC | Facility: CLINIC | Age: 54
End: 2024-08-24
Payer: COMMERCIAL

## 2024-08-24 ENCOUNTER — TELEPHONE (OUTPATIENT)
Dept: FAMILY MEDICINE CLINIC | Facility: CLINIC | Age: 54
End: 2024-08-24

## 2024-08-24 VITALS
HEIGHT: 63 IN | SYSTOLIC BLOOD PRESSURE: 129 MMHG | WEIGHT: 253 LBS | HEART RATE: 78 BPM | BODY MASS INDEX: 44.83 KG/M2 | DIASTOLIC BLOOD PRESSURE: 84 MMHG

## 2024-08-24 DIAGNOSIS — I10 ESSENTIAL HYPERTENSION: ICD-10-CM

## 2024-08-24 DIAGNOSIS — S83.92XS SPRAIN OF LEFT KNEE, UNSPECIFIED LIGAMENT, SEQUELA: Primary | ICD-10-CM

## 2024-08-24 PROCEDURE — 3079F DIAST BP 80-89 MM HG: CPT | Performed by: FAMILY MEDICINE

## 2024-08-24 PROCEDURE — 3074F SYST BP LT 130 MM HG: CPT | Performed by: FAMILY MEDICINE

## 2024-08-24 PROCEDURE — 99214 OFFICE O/P EST MOD 30 MIN: CPT | Performed by: FAMILY MEDICINE

## 2024-08-24 PROCEDURE — G2211 COMPLEX E/M VISIT ADD ON: HCPCS | Performed by: FAMILY MEDICINE

## 2024-08-24 PROCEDURE — 3008F BODY MASS INDEX DOCD: CPT | Performed by: FAMILY MEDICINE

## 2024-08-24 NOTE — TELEPHONE ENCOUNTER
PT brought in disabilty forms from her job. PT handed forms to MD. No VICENTA was signed. Emailed forms to forms dept.

## 2024-08-24 NOTE — PROGRESS NOTES
HPI:    Betty Lopez is a 53 year old adult presents to clinic for ER follow up. On , she was seen for a sprain of left knee. Unsure how she injured her knee but was up int he middle of the night with her cat, woke up with severe pain/swelling. She is taking Meloxicam as needed, does not like side effects from narcotics. Has an appt with Orthopedics next month. She is doing home PT exercises. States that improvement/progress has been slow. She would like to discuss work restrictions.       HISTORY:  Past Medical History:    Anxiety    Death of a sister    Anxiety state    Calculus of kidney    Carpal tunnel syndrome    Depression    Essential hypertension    High blood pressure    High cholesterol    Hx of motion sickness    riding in cars    Hyperalgia    Hyperlipidemia    Knee pain, bilateral    Migraine headache    Migraines    Osteoarthritis    Sleep apnea    Tremor    possible side-effect of medications    Visual impairment    Readers      Past Surgical History:   Procedure Laterality Date    Anesthesia for;  procedures      EAB in 's     Colonoscopy N/A 2023    Procedure: COLONOSCOPY;  Surgeon: Naomi Garcia MD;  Location: [facility] ENDOSCOPY    Hernia surgery      Hysterectomy  2021    PROCEDURE:  Supracervical total abdominal hysterectomy and bilateral salpingo-oophorectomy.    Incisional hernia repair N/A 2022    Lap to open    Oophorectomy  2021    PROCEDURE:  Supracervical total abdominal hysterectomy and bilateral salpingo-oophorectomy.    Other surgical history  1976    surgery done as a child for bladder issue     Total abdominal hysterectomy N/A 2021    Procedure: supracervical total abdominal hysterectomy, bilateral salpingo, oophorectomy;  Surgeon: Ariana Walker MD;  Location: [facility] MAIN OR      Family History   Problem Relation Age of Onset    Heart Disorder Father     Heart Disorder Mother         Cardio Aortic aneurysm    Depression Mother          Never diagnosed,     Hypertension Mother     Migraines Mother     Breast Cancer Sister 35    Dementia Maternal Grandmother         Also Alzheimer's disease    Dementia Paternal Grandmother         Also Alzheimer's disease    Anxiety Sister     Depression Sister     Migraines Sister     Depression Sister     Migraines Sister     Traumatic brain injury Sister       Social History:   Social History     Socioeconomic History    Marital status: Single   Tobacco Use    Smoking status: Former     Current packs/day: 0.00     Average packs/day: 0.5 packs/day for 13.0 years (6.5 ttl pk-yrs)     Types: Cigarettes     Start date: 1989     Quit date: 2002     Years since quittin.8    Smokeless tobacco: Never    Tobacco comments:     It wasn't consistent or daily.   Vaping Use    Vaping status: Never Used   Substance and Sexual Activity    Alcohol use: Not Currently     Comment: not since last month in November    Drug use: Not Currently     Types: Cannabis     Comment: Never again, cannabis gives me joint pain.    Sexual activity: Not Currently   Other Topics Concern    Blood Transfusions No    Special Diet Yes     Comment: Dash Diet        Medications (Active prior to today's visit):  Current Outpatient Medications   Medication Sig Dispense Refill    venlafaxine  MG Oral Capsule SR 24 Hr Take 1 cap PO daily with 37.5mg cap (187.5mg daily) 90 capsule 0    venlafaxine ER (EFFEXOR XR) 37.5 MG Oral Capsule SR 24 Hr Take 1 cap PO daily with 150mg cap (187.5mg daily) 90 capsule 0    amphetamine-dextroamphetamine (ADDERALL) 10 MG Oral Tab Take 1 tablet (10 mg total) by mouth 2 (two) times daily as needed. 60 tablet 0    HYDROcodone-acetaminophen 5-325 MG Oral Tab Take 1-2 tablets by mouth every 6 (six) hours as needed for Pain. 20 tablet 0    levocetirizine 5 MG Oral Tab Take 1 tablet (5 mg total) by mouth every evening. 90 tablet 1    Galcanezumab-gnlm (EMGALITY) 120 MG/ML Subcutaneous Solution  Auto-injector Inject 1 mL into the skin every 30 (thirty) days. 1 mL 2    OMEPRAZOLE 40 MG Oral Capsule Delayed Release TAKE 1 CAPSULE(40 MG) BY MOUTH EVERY MORNING BEFORE BREAKFAST 90 capsule 1    traZODone 50 MG Oral Tab Take 1 tablet (50 mg total) by mouth nightly as needed for Sleep. 30 tablet 2    hydrOXYzine 25 MG Oral Tab Take 0.5-1 tablets (12.5-25 mg total) by mouth 3 (three) times daily as needed for Anxiety (or insomnia). 90 tablet 0    atorvastatin 20 MG Oral Tab Take 1 tablet (20 mg total) by mouth nightly. 90 tablet 3    ferrous sulfate 325 (65 FE) MG Oral Tab EC Take 1 tablet (325 mg total) by mouth daily with breakfast.      lisinopril 10 MG Oral Tab Take 1 tablet (10 mg total) by mouth nightly. 90 tablet 3    Multiple Vitamins-Iron (MULTI-DAY PLUS IRON) Oral Tab Take by mouth.      Meloxicam 15 MG Oral Tab Take 1 tablet (15 mg total) by mouth daily. 30 tablet 0    Melatonin 10 MG Oral Cap Take by mouth.         Allergies:  Allergies   Allergen Reactions    Marijuana (Cannabis Sativa) PAIN    Penicillin G NAUSEA AND VOMITING         Depression Screening (PHQ-2/PHQ-9): Over the LAST 2 WEEKS                         ROS:   Review of Systems   All other systems reviewed and are negative.      PHYSICAL EXAM:     Vitals:    08/24/24 1115   BP: 129/84   BP Location: Left arm   Patient Position: Sitting   Cuff Size: large   Pulse: 78   Weight: 253 lb (114.8 kg)   Height: 5' 3\" (1.6 m)     Physical Exam  Vitals reviewed.   Constitutional:       General: Betty is not in acute distress.  Cardiovascular:      Rate and Rhythm: Normal rate.   Pulmonary:      Effort: Pulmonary effort is normal.   Neurological:      Mental Status: Betty is alert.   Psychiatric:         Mood and Affect: Mood normal.         ASSESSMENT/PLAN:   (S83.92XS) Sprain of left knee, unspecified ligament, sequela  (primary encounter diagnosis)  Plan:   - continued supportive care measures discussed. Declined formal PT referral. FMLA papers  to [employer] - is requesting restrictions of limited walking when possible, would also like to call into work on days pain is more severe. She will follow up with Orthopedics in September. Will continue to follow.     (I10) Essential hypertension  Plan:   - BP elevated in ER. At goal today. To continue current management.              Responsible party/patient verbalized understanding of information discussed. No barriers to learning observed.            Orders This Visit:  No orders of the defined types were placed in this encounter.      Meds This Visit:  Requested Prescriptions      No prescriptions requested or ordered in this encounter       Imaging & Referrals:  None     Chaperone offered at visit today.     The 21st Century cures Act makes medical notes like these available to patients in the interest of transparency.  However, be advised that this is a medical document.  It is intended as peer to peer communication.  It is written in medical language and may contain abbreviations or verbiage that are unfamiliar.  It may appear blunt or direct.  Medical documents are intended to carry relevant information, facts as evident, and the clinical opinion of the practitioner.      This note was created by [employer] voice recognition. Errors in content may be related to improper recognition by the system; efforts to review and correct have been done but errors may still exist. Please contact me with any questions.       8/24/2024  Robin Cunningham MD

## 2024-08-28 DIAGNOSIS — G43.111 INTRACTABLE MIGRAINE WITH AURA WITH STATUS MIGRAINOSUS: ICD-10-CM

## 2024-08-29 NOTE — TELEPHONE ENCOUNTER
Americans with Disabilities Act forms received. [employer] message sent for authorization. Logged for processing.

## 2024-08-29 NOTE — TELEPHONE ENCOUNTER
Requested Prescriptions     Pending Prescriptions Disp Refills    NURTEC 75 MG Oral Tablet Dispersible [Pharmacy Med Name: NURTEC 75MG ODT TABLETS] 8 tablet 11     Sig: DISSOLVE 1 TABLET(75 MG) ON THE TONGUE AT ONSET OF MIGRAINE AS NEEDED. MAXIMUM DOSE IN 24 HOURS IS 1 TABLET 75 MG     Last OV:   Next OV: Neurology (Nelson Gibson DO)  08/30/2024 at 9:20 AM     [place]/;

## 2024-08-30 ENCOUNTER — OFFICE VISIT (OUTPATIENT)
Dept: NEUROLOGY | Facility: CLINIC | Age: 54
End: 2024-08-30
Payer: COMMERCIAL

## 2024-08-30 VITALS — BODY MASS INDEX: 44.65 KG/M2 | HEIGHT: 63 IN | WEIGHT: 252 LBS

## 2024-08-30 DIAGNOSIS — G43.E11 INTRACTABLE CHRONIC MIGRAINE WITH AURA WITH STATUS MIGRAINOSUS: Primary | ICD-10-CM

## 2024-08-30 PROCEDURE — 99214 OFFICE O/P EST MOD 30 MIN: CPT | Performed by: OTHER

## 2024-08-30 PROCEDURE — 3008F BODY MASS INDEX DOCD: CPT | Performed by: OTHER

## 2024-08-30 RX ORDER — KETOROLAC TROMETHAMINE 30 MG/ML
30 INJECTION, SOLUTION INTRAMUSCULAR; INTRAVENOUS ONCE
Status: COMPLETED | OUTPATIENT
Start: 2024-08-30 | End: 2024-08-30

## 2024-08-30 RX ORDER — FREMANEZUMAB-VFRM 225 MG/1.5ML
225 INJECTION SUBCUTANEOUS
Qty: 1.5 EACH | Refills: 11 | Status: SHIPPED | OUTPATIENT
Start: 2024-08-30

## 2024-08-30 RX ORDER — UBROGEPANT 100 MG/1
TABLET ORAL
Qty: 10 TABLET | Refills: 11 | Status: SHIPPED | OUTPATIENT
Start: 2024-08-30 | End: 2025-08-30

## 2024-08-30 RX ADMIN — KETOROLAC TROMETHAMINE 30 MG: 30 INJECTION, SOLUTION INTRAMUSCULAR; INTRAVENOUS at 12:35:00

## 2024-08-30 NOTE — PROGRESS NOTES
[facility]  [address] [place] [address], [address]  [facility] [address]  [phone]        Neurology Clinic Video Visit Follow Up Note    Chief Complaint:  Migraine (LOV 04/11/2023 Pt presents with Migraines. They states that them is taking Galcanezumab-gnlm (EMGALITY) 120 MG/ML Subcutaneous Solution Auto-injector and its not helpful. Pt states that they are taking Nurtec more frequently as a result.Pt states that they get about 5 migraines weekly. One even lasting 2 days. They are seeing auras today.)      HPI:   Betty Lopez is a 53 year old LEFT handed person  w/ a pmhx of  depression, hx of suicide attempt when they were much younger, HTN, prior history of smoking, migraine with visual aura and olfactory hallucinations, B/Ll knee OA requiring the use of a cane, nephrolithiasis, Hx of menorrhagia s/p  TAHOO C/B incisional hernia repair s/p surgical treatment, significant family history of migraine, and thoughts of self-harm on rizatriptan who presents in follow up for worsening migraines with aura, carpal tunnel, and SADIE.     On  5/24/23 they had a severe migraine w/ new left hemisphere aura symptoms including aphasia, right leg weakness, and different olfactory hallucinations. Her tremors were worse during the HA, which  persisted for days. They had a 3 sequence MRI brain that was negative for an acute stroke. Added propranolol, and if that failed then recommended emgality as a next step.      +Hx of status migrainosus. Also c/o of symptoms of neuropathy in her hands and feet.     Previously tried treatments  (need to fail 2 medications from 2 classes to qualify for emgality)     Preventive Treatment  Name Dose (if remembered) Results   Antidepressants effexor 150mg  decreased headaches but still has breakthroughs   Antiseizure  Can't be on Topamax b/c  they have nephrolithais     Beta blockers propranolol 20 mg bid Intolerable odor/taste  - failed   CCBs      Other Lisinopril    Emgality 10 mg No improvement   No improvement       Acute Treatment Name Dose Results   Analgesics/NSAIDs Ibuprofen naprxen  Does not work consistently    Ergot derivatives      Triptans All are contraindicated. Caused thoughts of self harm  All are contraindicated. Caused thoughts of self harm   CGRP nurtec 75mg works       08/30/24 Interval History/Subjective :   Here in follow up for migraines.  They are using a mouth guard now.  Now on trazodone.  More than 15 headache days a month.  She has 8 migraines a month.  The pain is disabling.  She has missed work b/c of her migraines.  She has had to leave early a few times.    Emgality is not providing relief.      06/16/23 Interval History/Subjective :   They are  still having daily 5/10 headaches even after prednisone and Depakote.  Taking Nurtec much more often than they would like. Needs a refill.     Denies any new focal deficits.  Not seen in person exam.  There was an in person visit scheduled earlier in the week but patient canceled.    Ordered infusion center referral for status migrainosus.  Explained this would also need to be approved by their insurance.    Needs prior authorization for emgality loading dose.  The 120 mg was approved but not the loading dose.  Reordered prior authorization today.  Patient is keeping the medication in their  fridge until they can get the loading dose.     Pt had the EMG; dx w/ mild right sided carpal tunnel.  Betty will get ?steroid injections by Dr. Mkie for her CTS.      Diagnosed with SADIE.  Discussed w/ pt.  Going to do the autopap based on preliminary discussion.    Lastly patient reports increased GI upset nausea vomiting diarrhea.  Wonders if any of her medications could be causing this.  Explained that Emgality can cause GI upset but the patient has not started that yet.     ROS: Pertinent positive and negatives per HPI.  All others were reviewed and negative.     Medications:  Current Outpatient Medications    Medication Instructions    amphetamine-dextroamphetamine (ADDERALL) 10 MG Oral Tab 10 mg, Oral, 2 times daily PRN    atorvastatin (LIPITOR) 20 mg, Oral, Nightly    ferrous sulfate 325 mg, Oral, Daily with breakfast    Galcanezumab-gnlm (EMGALITY) 120 MG/ML Subcutaneous Solution Auto-injector 1 mL, Subcutaneous, Every 30 days    HYDROcodone-acetaminophen 5-325 MG Oral Tab 1-2 tablets, Oral, Every 6 hours PRN    hydrOXYzine (ATARAX) 12.5-25 mg, Oral, 3 times daily PRN    levocetirizine (XYZAL) 5 mg, Oral, Every evening    lisinopril (ZESTRIL) 10 mg, Oral, Nightly    Melatonin 10 MG Oral Cap Oral    Meloxicam 15 mg, Oral, Daily    Multiple Vitamins-Iron (MULTI-DAY PLUS IRON) Oral Tab Oral    Omeprazole 40 mg, Oral, Before breakfast    traZODone (DESYREL) 50 mg, Oral, Nightly PRN    venlafaxine ER (EFFEXOR XR) 37.5 MG Oral Capsule SR 24 Hr Take 1 cap PO daily with 150mg cap (187.5mg daily)    venlafaxine  MG Oral Capsule SR 24 Hr Take 1 cap PO daily with 37.5mg cap (187.5mg daily)       Reviewed and assessed      Objective:  Last vitals and weight :  Body mass index is 44.64 kg/m².   Height 63\", weight 252 lb (114.3 kg), last menstrual period 03/02/2021.     Exam:  - General: appears stated age and no distress     - Pulmonary: no signs of respiratory distress. Normal excursion of the chest.    Neurologic Exam  - Mental Status: Alert and attentive.  Pleasant and cooperative.  Fund of knowledge are excellent. Speech is spontaneous, fluent, and prosodic. Comprehension  intact. Phrase length and rate are normal.   - Cranial Nerves: No gaze preference. Visual fields: Able to see the examiner's entire face.    No spontaneous nystagmus with primary gaze. No ptosis.No pathological facial asymmetry. No flattening of the nasolabial fold. .  Hearing grossly intact.  Tongue midline.Palate and uvula elevate symmetrically.  Shoulder shrug symmetric.    - Motor:   normal bulk.  Moving all 4 extremities spontaneously and  symmetrically.  No focal weakness.    Asterixis: No asterixis noted.  - Sensory: Denies any deficits  - Cerebellum: No truncal ataxia. No titubations. No dysmetria, no dysdiadochokinesis. No overshoot.     Assessment       Betty Lopez is 53 year old LEFT handed person  w/ a pmhx of  depression, hx of suicide attempt when Betty was much younger, HTN, prior history of smoking, migraine with visual aura and olfactory hallucinations, B/Ll knee OA requiring the use of a cane, nephrolithiasis, Hx of menorrhagia s/p  TAHOO C/B incisional hernia repair s/p surgical treatment, significant family history of migraine, and thoughts of self-harm on rizatriptan  who is seen in  follow up for chronic migraine.    They have a history of a severe migraine on 5/24/23 w/ new left hemisphere aura symptoms including aphasia, right leg weakness, and different olfactory hallucinations. Their tremors were worse during the HA, which persisted until  their 5/26/2023 video visit.    Their vertigo/imbalance is likely related to migraine. Pt's w/ migraine have a higher incidence of vertigo compared to those who do not. Betty may also have vestibular migraines.    Betty is still having more than 15 headache days a month. Betty needs an additional preventative agent.  Pt cannot be on Topamax because they have a hx of kidney stones.  Pt  is already on venlafaxine which has failed.  Betty could not tolerate propranolol 20 mg twice a day.   They  did not have improvement with effexor. Pt has failed 2 different medications from 2 different classes.    Will stop emgality and switch to ajovy.  Will also add botox to the ajovy.  Will switch nurtec to ubrelvy.                         Plan   1. Intractable chronic migraine with aura with status migrainosus  - SPECIALTY (OTHER) - INTERNAL  - Fremanezumab-vfrm (AJOVY) 225 MG/1.5ML Subcutaneous Solution Auto-injector; Inject 225 mg into the skin every 30 (thirty) days.  Dispense: 1.5 each;  Refill: 11  - ubrogepant (UBRELVY) 100 MG Oral Tab; Take one tablet at onset of migraine.  May take additional tablet in 2 hours if needed.  Do not exceed two tablets per 24 hour period.  Dispense: 10 tablet; Refill: 11  - ketorolac (Toradol) 30 MG/ML injection 30 mg               This document is not intended to support charting by exception.  Sections left blank in a completed note should be presumed not to have been done.      Disclaimer:   This record was dictated using  Dragon software. There may be errors due to voice recognition problems that were not realized and corrected during the completion of the note.             Thank you for allowing me to participate in the care of your patient.    Nelson Gibson DO  08/30/24

## 2024-08-31 RX ORDER — RIMEGEPANT SULFATE 75 MG/75MG
1 TABLET, ORALLY DISINTEGRATING ORAL AS NEEDED
Qty: 8 TABLET | Refills: 11 | OUTPATIENT
Start: 2024-08-31

## 2024-09-03 ENCOUNTER — TELEPHONE (OUTPATIENT)
Dept: PHYSICAL MEDICINE AND REHAB | Facility: CLINIC | Age: 54
End: 2024-09-03

## 2024-09-03 NOTE — TELEPHONE ENCOUNTER
[facility]U. [facility] [address] dx:G43.E11 with Availity    Status: Pending  Case # [id number]

## 2024-09-04 NOTE — TELEPHONE ENCOUNTER
I spoke with Ludwig from [facility] and supplied additional information to  help support approval of Botox.  Letter on medical necessity was also created and faxed to [facility] at [phone].  Will await determination.

## 2024-09-10 NOTE — TELEPHONE ENCOUNTER
Called patient for details for Family Medical Leave Act forms.    Type of Leave: ADA  Reason for Leave: Sprain of right knee relating to osteoarthritis  Start date of leave: 09/10/24  End date of leave: 03/10/25  How many flare ups per month/length?: 1-4 monthly last 1-24 hrs  How many appts per month/length?: none  Was Fee and Turnaround info Given?: yes

## 2024-09-10 NOTE — TELEPHONE ENCOUNTER
Albin Cunningham,    Patient is requesting accommodations to limit walking when possible. Patient is also requesting 1-4 flare ups monthly lasting 1-24 hrs. During flare up patient would like to call out of work or work from home.     Do you support?   Heidi

## 2024-09-12 ENCOUNTER — OFFICE VISIT (OUTPATIENT)
Dept: NEUROLOGY | Facility: CLINIC | Age: 54
End: 2024-09-12
Payer: COMMERCIAL

## 2024-09-12 DIAGNOSIS — G43.E11 INTRACTABLE CHRONIC MIGRAINE WITH AURA WITH STATUS MIGRAINOSUS: Primary | ICD-10-CM

## 2024-09-12 NOTE — PROCEDURES
- Check glucose outside of clinic, record numbers, and bring log to follow up visit.    - Take medications as directed, and bring all medications to every clinic appointment.    - Eat a diabetic diet, if there are concerns about what that entails, we have a diabetic educator in our clinic.    - Cardiovascular exercise at least 3 times per week, for at least 15 minutes.    - Patient should be on a Statin medication, unless patient cannot tolerate a Statin.    - Aspirin should be taken everyday,  81mg, unless a higher dose is indicated for other medicaiton conditions. Also do not recommend Aspirin for a patient with known adverse effects from Aspirin.    - Recommend yearly, dilated eye exams for all Diabetic Patients.    - Monitor feet for calluses, abrasion, or other abnormalities. Report any concerns at every clinic visit.    -   Hemoglobin A1C   Date Value Ref Range Status   04/25/2022 7.9 (H) 4.5 - 6.6 % Final     Comment:       Normal:               <5.7%  Pre-Diabetic:       5.7% to 6.4%  Diabetic:             >6.4%  Diabetic Goal:     <7%   01/17/2022 8.9 (H) 4.5 - 6.6 % Final     Comment:       Normal:               <5.7%  Pre-Diabetic:       5.7% to 6.4%  Diabetic:             >6.4%  Diabetic Goal:     <7%         PROCEDURE: Botox Injections (MIGRAINE PROTOCOL)   PRE-OPERATIVE DIAGNOSIS: Chronic Migraine  POST-OPERATIVE DIAGNOSIS: same as above   BLOOD LOSS: minimal COMPLICATIONS: none     DESCRIPTION OF PROCEDURE: After a discussion of the risks and benefits, the patient consented to the procedure. The patient was taken to the procedure room. The upper back, neck, and occipital area was prepped with alcohol swabs. The 2 Botox vials containing 100 units each, were reconstituted with saline.     There are 31 distinct targets in the standard protocol.    Following muscles and units were injected:     10 units divided between 2 sites.    Procerus 5 units in 1 site.    Frontalis 20 units divided between 4 sites.    Temporalis 40 units divided between 8 sites.    Occipitalis 30 units divided between 6 sites.    Cervical paraspinal 20 units divided between 4 sites.    Trapezius 30 units divided between 6 sites.    Patient tolerated the procedure well.    Total of 155 Units of botulinum toxin were used and 45 Units were wasted.

## 2024-09-13 ENCOUNTER — MED REC SCAN ONLY (OUTPATIENT)
Dept: NEUROLOGY | Facility: CLINIC | Age: 54
End: 2024-09-13

## 2024-09-13 NOTE — TELEPHONE ENCOUNTER
Dr. Cunningham       Please sign off on form if you agree to: Americans with Disabilities Act   (place your signature on the first page only)    -From your Inbasket, Highlight the patient and click Chart   -Double click the 08/24/24 Forms Completion telephone encounter  -Scroll down to the Media section   -Click the blue Hyperlink: Americans with Disabilities Act  Dr Cunningham 09/13/24    -Click Acknowledge located in the top right ribbon/menu   -Drag the mouse into the blank space of the document and a + sign will appear. Left click to   electronically sign the document.     Thank you,    Jesus'

## 2024-09-16 NOTE — TELEPHONE ENCOUNTER
Forms complete and [email or web address] message sent to patient. No valid Release of Information on file.

## 2024-10-14 ENCOUNTER — TELEPHONE (OUTPATIENT)
Dept: NEUROLOGY | Facility: CLINIC | Age: 54
End: 2024-10-14

## 2024-11-11 ENCOUNTER — IMMUNIZATION (OUTPATIENT)
Dept: FAMILY MEDICINE CLINIC | Facility: CLINIC | Age: 54
End: 2024-11-11
Payer: COMMERCIAL

## 2024-11-11 DIAGNOSIS — Z23 NEED FOR INFLUENZA VACCINATION: Primary | ICD-10-CM

## 2024-11-11 PROCEDURE — 90471 IMMUNIZATION ADMIN: CPT | Performed by: NURSE PRACTITIONER

## 2024-11-11 PROCEDURE — 90656 IIV3 VACC NO PRSV 0.5 ML IM: CPT | Performed by: NURSE PRACTITIONER

## 2025-01-07 ENCOUNTER — MED REC SCAN ONLY (OUTPATIENT)
Dept: NEUROLOGY | Facility: CLINIC | Age: 55
End: 2025-01-07

## 2025-01-07 ENCOUNTER — OFFICE VISIT (OUTPATIENT)
Dept: NEUROLOGY | Facility: CLINIC | Age: 55
End: 2025-01-07
Payer: COMMERCIAL

## 2025-01-07 DIAGNOSIS — G43.E11 INTRACTABLE CHRONIC MIGRAINE WITH AURA WITH STATUS MIGRAINOSUS: Primary | ICD-10-CM

## 2025-01-07 PROBLEM — F33.2 MDD (MAJOR DEPRESSIVE DISORDER), RECURRENT SEVERE, WITHOUT PSYCHOSIS (HCC): Status: ACTIVE | Noted: 2025-01-07

## 2025-01-07 NOTE — PROGRESS NOTES

## 2025-01-22 ENCOUNTER — IMMUNIZATION (OUTPATIENT)
Dept: LAB | Age: 55
End: 2025-01-22
Attending: EMERGENCY MEDICINE
Payer: COMMERCIAL

## 2025-01-22 DIAGNOSIS — Z23 NEED FOR VACCINATION: Primary | ICD-10-CM

## 2025-01-22 PROCEDURE — 90480 ADMN SARSCOV2 VAC 1/ONLY CMP: CPT

## 2025-01-28 RX ORDER — LEVOCETIRIZINE DIHYDROCHLORIDE 5 MG/1
5 TABLET, FILM COATED ORAL EVERY EVENING
Qty: 90 TABLET | Refills: 1 | Status: SHIPPED | OUTPATIENT
Start: 2025-01-28

## 2025-01-28 NOTE — TELEPHONE ENCOUNTER
Received a refill request for Xyzal 5 mg- 1 tablet by mouth daily.     Last office visit was 7/29/24 for allergies.    Refill meets protocol- refilled.

## 2025-02-18 ENCOUNTER — TELEPHONE (OUTPATIENT)
Dept: PHYSICAL MEDICINE AND REHAB | Facility: CLINIC | Age: 55
End: 2025-02-18

## 2025-02-18 NOTE — TELEPHONE ENCOUNTER
Insurance: [employer] PPO Member ID# [id number]   Medication: Botox 200 units  Number of visits Approved: 5  (PLEASE INFORM THE PATIENT TO CONTACT THE OFFICE IF THE INSURANCE CHANGES WITHIN THE YEAR AS HE/SHE WILL BE RESPONSIBLE TO UPDATE THE OFFICE IF INSURANCE CHANGES SO THAT PROCEDURE IS BILLED CORRECTLY) this will be 3 of 5  Dx: G43.E11  Last Botox done: 1/7/25  Next Botox due around: 4/8/25 - Scheduled  Authorization # [id number]  Valid 9/3/24-9/3/25  [employer]BILL  Authorization is not a guarantee of payment and may be subject to review once claim is submitted.

## 2025-04-16 RX ORDER — ATORVASTATIN CALCIUM 20 MG/1
20 TABLET, FILM COATED ORAL NIGHTLY
Qty: 90 TABLET | Refills: 3 | OUTPATIENT
Start: 2025-04-16

## 2025-04-28 ENCOUNTER — TELEPHONE (OUTPATIENT)
Dept: ALLERGY | Facility: CLINIC | Age: 55
End: 2025-04-28

## 2025-04-28 DIAGNOSIS — Z88.0 PENICILLIN ALLERGY: Primary | ICD-10-CM

## 2025-04-28 NOTE — TELEPHONE ENCOUNTER
[facility]/11/2025 at 11:30 am for oral challenge to Penicillin. Unfortunately this test can only be done in certain time slots due to the length of the test. Because of this, it may only be scheduled by the allergy RN's.      Left message for patient to call back to schedule in an appropriate time slot for oral challenge. Please speak to allergy RN for this appointment request.

## 2025-05-06 NOTE — TELEPHONE ENCOUNTER
Left message for patient to call back to schedule in an appropriate time slot for oral challenge. Please speak to allergy RN for this appointment request.   Patient currently scheduled on 6/11/25 at 11:30 am which unfortunately is not a correct time slot for an oral challenge  [employer] message sent as well

## 2025-05-07 NOTE — TELEPHONE ENCOUNTER
RN attempted to reach out to patient   Left voicemail for patient to please call office back when available   Need to reschedule oral challenge for penicillin   Patient scheduled via [employer] for oral challenge on 6/11/25 at 11:30 am   When patient returns call please forward to nursing staff to schedule in proper time slot

## 2025-05-12 NOTE — TELEPHONE ENCOUNTER
Left message for patient to call back to schedule oral challenge appt.     [employer] message sent to patient with one possible date in August. Will await reply.

## 2025-05-13 NOTE — TELEPHONE ENCOUNTER
Call noted.  Yes we will repeat testing to penicillin in September 2025.  Labs were ordered for September

## 2025-05-13 NOTE — TELEPHONE ENCOUNTER
Pt returned call to reschedule PCN oral challenge.    Pt concerned about her anxiety medications causing inaccurate testing.  Pt takes both Hydroxyzine and Venlafaxine PRN and Trazodone daily for anxiety.   RN advised that if she is able to tolerate being off of Hydroxyzine 5 days prior to testing that would be helpful.     Pt rescheduled for Wednesday morning 10/22/2025 at 8:45am.  She is aware to contact our office a week prior for refill.  Pt last labs on 7/29/2024--Dr. Tatum will pt need labs re-drawn?     Routed to Dr. Tatum for further review.

## 2025-05-13 NOTE — TELEPHONE ENCOUNTER
Left another message for patient. Asked her to call back or respond to yesterday's [employer] message regarding the August 21st offered appt time. Notified her that it will only be left on hold through today, and then will be offered to someone else.   Will await call back.

## 2025-05-14 ENCOUNTER — OFFICE VISIT (OUTPATIENT)
Dept: INTERNAL MEDICINE CLINIC | Facility: CLINIC | Age: 55
End: 2025-05-14
Payer: COMMERCIAL

## 2025-05-14 VITALS
SYSTOLIC BLOOD PRESSURE: 112 MMHG | HEART RATE: 95 BPM | OXYGEN SATURATION: 94 % | RESPIRATION RATE: 20 BRPM | DIASTOLIC BLOOD PRESSURE: 73 MMHG | HEIGHT: 61.8 IN | WEIGHT: 252.63 LBS | BODY MASS INDEX: 46.49 KG/M2 | TEMPERATURE: 97 F

## 2025-05-14 DIAGNOSIS — F33.2 MDD (MAJOR DEPRESSIVE DISORDER), RECURRENT SEVERE, WITHOUT PSYCHOSIS (HCC): ICD-10-CM

## 2025-05-14 DIAGNOSIS — E73.9 LACTOSE INTOLERANCE: ICD-10-CM

## 2025-05-14 DIAGNOSIS — I10 ESSENTIAL HYPERTENSION: ICD-10-CM

## 2025-05-14 DIAGNOSIS — M17.0 BILATERAL PRIMARY OSTEOARTHRITIS OF KNEE: ICD-10-CM

## 2025-05-14 DIAGNOSIS — Z23 NEED FOR VACCINATION: ICD-10-CM

## 2025-05-14 DIAGNOSIS — E78.5 HYPERLIPIDEMIA, UNSPECIFIED HYPERLIPIDEMIA TYPE: ICD-10-CM

## 2025-05-14 DIAGNOSIS — G43.101 MIGRAINE WITH AURA AND WITH STATUS MIGRAINOSUS, NOT INTRACTABLE: ICD-10-CM

## 2025-05-14 DIAGNOSIS — Z11.9 ENCOUNTER FOR SCREENING EXAMINATION FOR INFECTIOUS DISEASE: ICD-10-CM

## 2025-05-14 DIAGNOSIS — N95.1 VASOMOTOR SYMPTOMS DUE TO MENOPAUSE: Primary | ICD-10-CM

## 2025-05-14 DIAGNOSIS — Z96.652 S/P TKR (TOTAL KNEE REPLACEMENT), LEFT: ICD-10-CM

## 2025-05-14 DIAGNOSIS — K21.9 GASTROESOPHAGEAL REFLUX DISEASE, UNSPECIFIED WHETHER ESOPHAGITIS PRESENT: ICD-10-CM

## 2025-05-14 PROCEDURE — 3078F DIAST BP <80 MM HG: CPT | Performed by: INTERNAL MEDICINE

## 2025-05-14 PROCEDURE — 90677 PCV20 VACCINE IM: CPT | Performed by: INTERNAL MEDICINE

## 2025-05-14 PROCEDURE — 3008F BODY MASS INDEX DOCD: CPT | Performed by: INTERNAL MEDICINE

## 2025-05-14 PROCEDURE — 90471 IMMUNIZATION ADMIN: CPT | Performed by: INTERNAL MEDICINE

## 2025-05-14 PROCEDURE — 99214 OFFICE O/P EST MOD 30 MIN: CPT | Performed by: INTERNAL MEDICINE

## 2025-05-14 PROCEDURE — 3074F SYST BP LT 130 MM HG: CPT | Performed by: INTERNAL MEDICINE

## 2025-05-14 RX ORDER — ROSUVASTATIN CALCIUM 10 MG/1
10 TABLET, COATED ORAL NIGHTLY
Qty: 90 TABLET | Refills: 0 | Status: SHIPPED | OUTPATIENT
Start: 2025-05-14

## 2025-05-14 RX ORDER — AZELASTINE 1 MG/ML
1 SPRAY, METERED NASAL 2 TIMES DAILY
COMMUNITY
Start: 2025-03-16

## 2025-05-14 NOTE — ASSESSMENT & PLAN NOTE
Continue venlafaxine.  Patient follows with psychiatry.  She also follows with therapist.  Orders:    CBC With Differential With Platelet; Future    Comp Metabolic Panel (14); Future    Lipid Panel; Future    TSH W Reflex To Free T4; Future    Hemoglobin A1C; Future    Vitamin D; Future    Rubeola(Measles)Antibodies, IGG-Immunity [E]; Future    Mumps Antibodies, IGG-Immunity [E]; Future    Rubella, IGG [E]; Future

## 2025-05-14 NOTE — PROGRESS NOTES
Betty Lopez is a 54 year old adult with complaints of:  Chief Complaint: Establish Care    HPI     Betty Lopez is a(n) 54 year old adult with a history of migraines, osteoarthritis status post left knee arthroplasty, hypertension, hyperlipidemia, GERD, anxiety/depression, who presents to establish care.    Patient is currently going through menopause. They are experiencing hot flashes. Venlafaxine is not helping with this.     Interested in MMR testing.     Migraines.  They have had migraines since puberty. Very strong family history of migraines in mother and siblings. Patient Patient is following with neurology, Dr. Gibson.  Has gotten Botox injections through Dr. Stevens. They are on Ajovy and Ubrelvy. Migraines continue unabated.     Osteoarthritis.  Status post left knee TKA. They have been having some pain, weakness, and buckling. However, the pain in the left knee has improved quite a bit following the TKA. Will be contacting her ortho regarding this. Planning on the R knee when they can.     Hyperlipidemia.  Was at some point on atorvastatin 20 mg daily.  Lipid panel was last done on 6/18/2024, notable for an LDL of 143, and a total cholesterol of 227. Stopped because insurance did not cover -- has fhx of heart disease, should be on statin, was tolerating atorva well.     Hypertension.  Continues on lisinopril 10 mg daily.  Blood pressure 112/73 today.    GERD.  Was at some point on omeprazole 40 mg daily.  Reflux symptoms improved after cutting out dairy.    MDD/BRAULOI/ADHD. Following with psych (James Waller) and a therapist. Continues on venlafaxine 187.5 mg daily, divided in a 150 mg capsule in addition to a 37.5 mg capsule.  Furthermore, patient is also on Adderall.    Will be undergoing allergy testing with Dr. Tatum in the fall.     Past Medical History     Past Medical History[1]     Past Surgical History     Past Surgical History[2]     Family History     Family History[3]    Social History      Short Social Hx on File[4]    Allergies     Allergies[5]    Current Medications     Current Outpatient Medications   Medication Sig Dispense Refill    azelastine 0.1 % Nasal Solution 1 spray by Nasal route 2 (two) times daily.      amphetamine-dextroamphetamine (ADDERALL) 10 MG Oral Tab Take 1 tablet (10 mg total) by mouth 2 (two) times daily as needed. 60 tablet 0    LEVOCETIRIZINE 5 MG Oral Tab TAKE 1 TABLET(5 MG) BY MOUTH EVERY EVENING 90 tablet 1    venlafaxine ER (EFFEXOR XR) 37.5 MG Oral Capsule SR 24 Hr Take 1 cap PO daily with 150mg cap (187.5mg daily) 90 capsule 0    venlafaxine  MG Oral Capsule SR 24 Hr Take 1 cap PO daily with 37.5mg cap (187.5mg daily) 90 capsule 0    traZODone 50 MG Oral Tab Take 1 tablet (50 mg total) by mouth nightly as needed for Sleep. 90 tablet 0    hydrOXYzine 25 MG Oral Tab Take 0.5-1 tablets (12.5-25 mg total) by mouth 3 (three) times daily as needed for Anxiety (or insomnia). 90 tablet 0    Fremanezumab-vfrm (AJOVY) 225 MG/1.5ML Subcutaneous Solution Auto-injector Inject 225 mg into the skin every 30 (thirty) days. 1.5 each 11    ubrogepant (UBRELVY) 100 MG Oral Tab Take one tablet at onset of migraine.  May take additional tablet in 2 hours if needed.  Do not exceed two tablets per 24 hour period. 10 tablet 11    ferrous sulfate 325 (65 FE) MG Oral Tab EC Take 1 tablet (325 mg total) by mouth daily with breakfast.      lisinopril 10 MG Oral Tab Take 1 tablet (10 mg total) by mouth nightly. 90 tablet 3    Multiple Vitamins-Iron (MULTI-DAY PLUS IRON) Oral Tab Take by mouth.      Meloxicam 15 MG Oral Tab Take 1 tablet (15 mg total) by mouth daily. 30 tablet 0    Melatonin 10 MG Oral Cap Take by mouth.      HYDROcodone-acetaminophen 5-325 MG Oral Tab Take 1-2 tablets by mouth every 6 (six) hours as needed for Pain. (Patient not taking: Reported on 5/14/2025) 20 tablet 0    OMEPRAZOLE 40 MG Oral Capsule Delayed Release TAKE 1 CAPSULE(40 MG) BY MOUTH EVERY MORNING BEFORE  BREAKFAST (Patient not taking: Reported on 5/14/2025) 90 capsule 1    atorvastatin 20 MG Oral Tab Take 1 tablet (20 mg total) by mouth nightly. (Patient not taking: Reported on 5/14/2025) 90 tablet 3     No current facility-administered medications for this visit.       Review of Systems     GENERAL HEALTH: feels well otherwise  SKIN: denies any unusual skin lesions or rashes  RESPIRATORY: denies shortness of breath with exertion  CARDIOVASCULAR: denies chest pain on exertion  GI: denies abdominal pain and denies heartburn  : denies any burning with urination, urinary frequency or urgency  NEURO: denies headaches, numbness or tingling, mental status changes  PSYCH: denies depressed mood, anxiety  MUSC: denies muscle aches, joint pain    Physical Exam     /73 (BP Location: Left arm, Patient Position: Sitting, Cuff Size: large)   Pulse 95   Temp 97.1 °F (36.2 °C) (Temporal)   Resp 20   Ht 5' 1.8\" (1.57 m)   Wt 252 lb 9.6 oz (114.6 kg)   LMP 03/02/2021   SpO2 94%   BMI 46.50 kg/m²     GENERAL: well developed, well nourished,in no apparent distress  SKIN: no rashes,no suspicious lesions  HEENT: atraumatic, normocephalic,ears and throat are clear  NECK: supple,no adenopathy,no bruits  LUNGS: clear to auscultation  CARDIO: RRR without murmur  GI: good BS's,no masses, HSM or tenderness  EXTREMITIES: no cyanosis, clubbing or edema    Assessment and Plan     Assessment & Plan  Vasomotor symptoms due to menopause  Continue venlafaxine for now.  Would not start estrogen replacement therapy given family history of cardiac disease, and migraines with aura.  Orders:    CBC With Differential With Platelet; Future    Comp Metabolic Panel (14); Future    Lipid Panel; Future    TSH W Reflex To Free T4; Future    Hemoglobin A1C; Future    Vitamin D; Future    Rubeola(Measles)Antibodies, IGG-Immunity [E]; Future    Mumps Antibodies, IGG-Immunity [E]; Future    Rubella, IGG [E]; Future    Encounter for screening  examination for infectious disease  Check for immunity to measles mumps and rubella.  Orders:    CBC With Differential With Platelet; Future    Comp Metabolic Panel (14); Future    Lipid Panel; Future    TSH W Reflex To Free T4; Future    Hemoglobin A1C; Future    Vitamin D; Future    Rubeola(Measles)Antibodies, IGG-Immunity [E]; Future    Mumps Antibodies, IGG-Immunity [E]; Future    Rubella, IGG [E]; Future    Migraine with aura and with status migrainosus, not intractable  Continue to follow with neurology.  She continues on a regimen as above.  Orders:    CBC With Differential With Platelet; Future    Comp Metabolic Panel (14); Future    Lipid Panel; Future    TSH W Reflex To Free T4; Future    Hemoglobin A1C; Future    Vitamin D; Future    Rubeola(Measles)Antibodies, IGG-Immunity [E]; Future    Mumps Antibodies, IGG-Immunity [E]; Future    Rubella, IGG [E]; Future    Bilateral primary osteoarthritis of knee  S/P TKR (total knee replacement), left  Still has some pain in her left knee, but generally improved from prior.  However, she feels like she still has a weakness and buckling, and that the knee has \"shifted\".  Follow with orthopedics regarding this.  Orders:    CBC With Differential With Platelet; Future    Comp Metabolic Panel (14); Future    Lipid Panel; Future    TSH W Reflex To Free T4; Future    Hemoglobin A1C; Future    Vitamin D; Future    Rubeola(Measles)Antibodies, IGG-Immunity [E]; Future    Mumps Antibodies, IGG-Immunity [E]; Future    Rubella, IGG [E]; Future    Essential hypertension  Continue lisinopril 10 mg daily.  Orders:    CBC With Differential With Platelet; Future    Comp Metabolic Panel (14); Future    Lipid Panel; Future    TSH W Reflex To Free T4; Future    Hemoglobin A1C; Future    Vitamin D; Future    Rubeola(Measles)Antibodies, IGG-Immunity [E]; Future    Mumps Antibodies, IGG-Immunity [E]; Future    Rubella, IGG [E]; Future    Hyperlipidemia, unspecified hyperlipidemia type  As  insurance does not cover atorvastatin, will start rosuvastatin 10 mg daily.  Recheck cholesterol panel in 1 month prior to her physical.  Orders:    CBC With Differential With Platelet; Future    Comp Metabolic Panel (14); Future    Lipid Panel; Future    TSH W Reflex To Free T4; Future    Hemoglobin A1C; Future    Vitamin D; Future    Rubeola(Measles)Antibodies, IGG-Immunity [E]; Future    Mumps Antibodies, IGG-Immunity [E]; Future    Rubella, IGG [E]; Future    Gastroesophageal reflux disease, unspecified whether esophagitis present  Overall, acid reflux symptoms have improved.  Continue omeprazole 40 mg daily as needed.  Orders:    CBC With Differential With Platelet; Future    Comp Metabolic Panel (14); Future    Lipid Panel; Future    TSH W Reflex To Free T4; Future    Hemoglobin A1C; Future    Vitamin D; Future    Rubeola(Measles)Antibodies, IGG-Immunity [E]; Future    Mumps Antibodies, IGG-Immunity [E]; Future    Rubella, IGG [E]; Future    Lactose intolerance  Continue to avoid dairy.  Can consider starting Lactaid as needed.  Orders:    CBC With Differential With Platelet; Future    Comp Metabolic Panel (14); Future    Lipid Panel; Future    TSH W Reflex To Free T4; Future    Hemoglobin A1C; Future    Vitamin D; Future    Rubeola(Measles)Antibodies, IGG-Immunity [E]; Future    Mumps Antibodies, IGG-Immunity [E]; Future    Rubella, IGG [E]; Future    MDD (major depressive disorder), recurrent severe, without psychosis (HCC)  Continue venlafaxine.  Patient follows with psychiatry.  She also follows with therapist.  Orders:    CBC With Differential With Platelet; Future    Comp Metabolic Panel (14); Future    Lipid Panel; Future    TSH W Reflex To Free T4; Future    Hemoglobin A1C; Future    Vitamin D; Future    Rubeola(Measles)Antibodies, IGG-Immunity [E]; Future    Mumps Antibodies, IGG-Immunity [E]; Future    Rubella, IGG [E]; Future    Need for vaccination    Orders:    PCV20 (Prevnar 20)         Current  Medications[6]    Requested Prescriptions     Signed Prescriptions Disp Refills    rosuvastatin 10 MG Oral Tab 90 tablet 0     Sig: Take 1 tablet (10 mg total) by mouth nightly.       Orders Placed This Encounter   Procedures    CBC With Differential With Platelet     Standing Status:   Future     Expected Date:   5/14/2025     Expiration Date:   5/14/2026     Release to patient:   Immediate    Comp Metabolic Panel (14)     Standing Status:   Future     Expected Date:   5/14/2025     Expiration Date:   5/14/2026    Lipid Panel     Standing Status:   Future     Expected Date:   5/14/2025     Expiration Date:   5/14/2026     Release to patient:   Immediate    TSH W Reflex To Free T4     Standing Status:   Future     Expected Date:   5/14/2025     Expiration Date:   5/14/2026     Release to patient:   Immediate    Hemoglobin A1C     Standing Status:   Future     Expected Date:   5/14/2025     Expiration Date:   5/14/2026     Release to patient:   Immediate    Vitamin D     Standing Status:   Future     Expected Date:   5/14/2025     Expiration Date:   5/14/2026     Please pick the scenario that best fits the purpose for ordering this test:   General Screening/Vit D deficiency (25-Hydroxy)     Release to patient:   Immediate    Rubeola(Measles)Antibodies, IGG-Immunity [E]     Standing Status:   Future     Expected Date:   5/14/2025     Expiration Date:   5/14/2026     Release to patient:   Immediate    Mumps Antibodies, IGG-Immunity [E]     Standing Status:   Future     Expected Date:   5/14/2025     Expiration Date:   5/14/2026     Release to patient:   Immediate    Rubella, IGG [E]     Standing Status:   Future     Expected Date:   5/14/2025     Expiration Date:   5/14/2026     Release to patient:   Immediate       Return in about 4 weeks (around 6/11/2025) for Annual physical .    The patient indicates understanding of these issues and agrees to the plan.    Electronically signed by Erica Flores MD 05/14/25              [1]   Past Medical History:   Anxiety    Death of a sister    Anxiety state    Calculus of kidney    Carpal tunnel syndrome    Depression    Essential hypertension    High blood pressure    High cholesterol    Hx of motion sickness    riding in cars    Hyperalgia    Hyperlipidemia    Knee pain, bilateral    Migraine headache    Migraines    Osteoarthritis    Sleep apnea    Tremor    possible side-effect of medications    Visual impairment    Readers   [2]   Past Surgical History:  Procedure Laterality Date    Anesthesia for;  procedures      EAB in 20's     Colonoscopy N/A 2023    Procedure: COLONOSCOPY;  Surgeon: Naomi Garcia MD;  Location: [facility] ENDOSCOPY    Hernia surgery      Hysterectomy  2021    PROCEDURE:  Supracervical total abdominal hysterectomy and bilateral salpingo-oophorectomy.    Incisional hernia repair N/A 2022    Lap to open    Oophorectomy  2021    PROCEDURE:  Supracervical total abdominal hysterectomy and bilateral salpingo-oophorectomy.    Other surgical history  1976    surgery done as a child for bladder issue     Total abdominal hysterectomy N/A 2021    Procedure: supracervical total abdominal hysterectomy, bilateral salpingo, oophorectomy;  Surgeon: Ariana Walker MD;  Location: [facility] MAIN OR   [3]   Family History  Problem Relation Age of Onset    Heart Disorder Father     Heart Disorder Mother         Cardio Aortic aneurysm    Depression Mother         Never diagnosed,     Hypertension Mother     Migraines Mother     Breast Cancer Sister 35    Dementia Maternal Grandmother         Also Alzheimer's disease    Dementia Paternal Grandmother         Also Alzheimer's disease    Anxiety Sister     Depression Sister     Migraines Sister     Depression Sister     Migraines Sister     Traumatic brain injury Sister    [4]   Social History  Socioeconomic History    Marital status: Single   Tobacco Use    Smoking status: Former     Current packs/day:  0.00     Average packs/day: 0.5 packs/day for 13.0 years (6.5 ttl pk-yrs)     Types: Cigarettes     Start date: 1989     Quit date: 2002     Years since quittin.5    Smokeless tobacco: Never    Tobacco comments:     It wasn't consistent or daily.   Vaping Use    Vaping status: Never Used   Substance and Sexual Activity    Alcohol use: Not Currently     Comment: not since last month in November    Drug use: Not Currently     Types: Cannabis     Comment: Never again, cannabis gives me joint pain.    Sexual activity: Not Currently   Other Topics Concern    Blood Transfusions No    Special Diet Yes     Comment: Dash Diet     Social Drivers of Health     Food Insecurity: No Food Insecurity (2025)    NCSS - Food Insecurity     Worried About Running Out of Food in the Last Year: No     Ran Out of Food in the Last Year: No   Transportation Needs: No Transportation Needs (2025)    NCSS - Transportation     Lack of Transportation: No   Housing Stability: Not At Risk (2025)    NCSS - Housing/Utilities     Has Housing: Yes     Worried About Losing Housing: No     Unable to Get Utilities: No   [5]   Allergies  Allergen Reactions    Marijuana (Cannabis Sativa) PAIN    Penicillin G NAUSEA AND VOMITING    Penicillins UNKNOWN, PAIN, FEVER, FACE FLUSHING, OTHER (SEE COMMENTS) and NAUSEA ONLY   [6]    azelastine 0.1 % Nasal Solution 1 spray by Nasal route 2 (two) times daily.      rosuvastatin 10 MG Oral Tab Take 1 tablet (10 mg total) by mouth nightly. 90 tablet 0    amphetamine-dextroamphetamine (ADDERALL) 10 MG Oral Tab Take 1 tablet (10 mg total) by mouth 2 (two) times daily as needed. 60 tablet 0    LEVOCETIRIZINE 5 MG Oral Tab TAKE 1 TABLET(5 MG) BY MOUTH EVERY EVENING 90 tablet 1    venlafaxine ER (EFFEXOR XR) 37.5 MG Oral Capsule SR 24 Hr Take 1 cap PO daily with 150mg cap (187.5mg daily) 90 capsule 0    venlafaxine  MG Oral Capsule SR 24 Hr Take 1 cap PO daily with 37.5mg cap (187.5mg  daily) 90 capsule 0    traZODone 50 MG Oral Tab Take 1 tablet (50 mg total) by mouth nightly as needed for Sleep. 90 tablet 0    hydrOXYzine 25 MG Oral Tab Take 0.5-1 tablets (12.5-25 mg total) by mouth 3 (three) times daily as needed for Anxiety (or insomnia). 90 tablet 0    Fremanezumab-vfrm (AJOVY) 225 MG/1.5ML Subcutaneous Solution Auto-injector Inject 225 mg into the skin every 30 (thirty) days. 1.5 each 11    ubrogepant (UBRELVY) 100 MG Oral Tab Take one tablet at onset of migraine.  May take additional tablet in 2 hours if needed.  Do not exceed two tablets per 24 hour period. 10 tablet 11    ferrous sulfate 325 (65 FE) MG Oral Tab EC Take 1 tablet (325 mg total) by mouth daily with breakfast.      lisinopril 10 MG Oral Tab Take 1 tablet (10 mg total) by mouth nightly. 90 tablet 3    Multiple Vitamins-Iron (MULTI-DAY PLUS IRON) Oral Tab Take by mouth.      Meloxicam 15 MG Oral Tab Take 1 tablet (15 mg total) by mouth daily. 30 tablet 0    Melatonin 10 MG Oral Cap Take by mouth.

## 2025-05-14 NOTE — PATIENT INSTRUCTIONS
Follow up for your annual physical in June 2025.     Please have fasting labs done 1 week before your next visit with me.    Start rosuvastatin 10 mg daily.    Please follow-up with your neurologist and your orthopedist.    Please schedule your mammogram.

## 2025-05-14 NOTE — ASSESSMENT & PLAN NOTE
Continue lisinopril 10 mg daily.  Orders:    CBC With Differential With Platelet; Future    Comp Metabolic Panel (14); Future    Lipid Panel; Future    TSH W Reflex To Free T4; Future    Hemoglobin A1C; Future    Vitamin D; Future    Rubeola(Measles)Antibodies, IGG-Immunity [E]; Future    Mumps Antibodies, IGG-Immunity [E]; Future    Rubella, IGG [E]; Future

## 2025-05-14 NOTE — ASSESSMENT & PLAN NOTE
Still has some pain in her left knee, but generally improved from prior.  However, she feels like she still has a weakness and buckling, and that the knee has \"shifted\".  Follow with orthopedics regarding this.  Orders:    CBC With Differential With Platelet; Future    Comp Metabolic Panel (14); Future    Lipid Panel; Future    TSH W Reflex To Free T4; Future    Hemoglobin A1C; Future    Vitamin D; Future    Rubeola(Measles)Antibodies, IGG-Immunity [E]; Future    Mumps Antibodies, IGG-Immunity [E]; Future    Rubella, IGG [E]; Future

## 2025-05-14 NOTE — ASSESSMENT & PLAN NOTE
Continue to follow with neurology.  She continues on a regimen as above.  Orders:    CBC With Differential With Platelet; Future    Comp Metabolic Panel (14); Future    Lipid Panel; Future    TSH W Reflex To Free T4; Future    Hemoglobin A1C; Future    Vitamin D; Future    Rubeola(Measles)Antibodies, IGG-Immunity [E]; Future    Mumps Antibodies, IGG-Immunity [E]; Future    Rubella, IGG [E]; Future

## 2025-06-23 ENCOUNTER — LAB ENCOUNTER (OUTPATIENT)
Dept: LAB | Facility: HOSPITAL | Age: 55
End: 2025-06-23
Attending: INTERNAL MEDICINE
Payer: COMMERCIAL

## 2025-06-23 DIAGNOSIS — Z96.652 S/P TKR (TOTAL KNEE REPLACEMENT), LEFT: ICD-10-CM

## 2025-06-23 DIAGNOSIS — M17.0 BILATERAL PRIMARY OSTEOARTHRITIS OF KNEE: ICD-10-CM

## 2025-06-23 DIAGNOSIS — N95.1 VASOMOTOR SYMPTOMS DUE TO MENOPAUSE: ICD-10-CM

## 2025-06-23 DIAGNOSIS — F33.2 MDD (MAJOR DEPRESSIVE DISORDER), RECURRENT SEVERE, WITHOUT PSYCHOSIS (HCC): ICD-10-CM

## 2025-06-23 DIAGNOSIS — K21.9 GASTROESOPHAGEAL REFLUX DISEASE, UNSPECIFIED WHETHER ESOPHAGITIS PRESENT: ICD-10-CM

## 2025-06-23 DIAGNOSIS — I10 ESSENTIAL HYPERTENSION: ICD-10-CM

## 2025-06-23 DIAGNOSIS — G43.101 MIGRAINE WITH AURA AND WITH STATUS MIGRAINOSUS, NOT INTRACTABLE: ICD-10-CM

## 2025-06-23 DIAGNOSIS — E73.9 LACTOSE INTOLERANCE: ICD-10-CM

## 2025-06-23 DIAGNOSIS — Z11.9 ENCOUNTER FOR SCREENING EXAMINATION FOR INFECTIOUS DISEASE: ICD-10-CM

## 2025-06-23 DIAGNOSIS — E78.5 HYPERLIPIDEMIA, UNSPECIFIED HYPERLIPIDEMIA TYPE: ICD-10-CM

## 2025-06-23 LAB
ALBUMIN SERPL-MCNC: 4.7 G/DL (ref 3.2–4.8)
ALBUMIN/GLOB SERPL: 2.1 {RATIO} (ref 1–2)
ALP LIVER SERPL-CCNC: 61 U/L
ALT SERPL-CCNC: 17 U/L
ANION GAP SERPL CALC-SCNC: 6 MMOL/L (ref 0–18)
AST SERPL-CCNC: 16 U/L (ref ?–34)
BASOPHILS # BLD AUTO: 0.08 X10(3) UL (ref 0–0.2)
BASOPHILS NFR BLD AUTO: 1.3 %
BILIRUB SERPL-MCNC: 0.4 MG/DL (ref 0.3–1.2)
BUN BLD-MCNC: 16 MG/DL (ref 9–23)
BUN/CREAT SERPL: 23.2 (ref 10–20)
CALCIUM BLD-MCNC: 9.1 MG/DL (ref 8.7–10.4)
CHLORIDE SERPL-SCNC: 107 MMOL/L (ref 98–112)
CHOLEST SERPL-MCNC: 193 MG/DL (ref ?–200)
CO2 SERPL-SCNC: 27 MMOL/L (ref 21–32)
CREAT BLD-MCNC: 0.69 MG/DL
DEPRECATED RDW RBC AUTO: 42.8 FL (ref 35.1–46.3)
EGFRCR SERPLBLD CKD-EPI 2021: 104 ML/MIN/1.73M2 (ref 60–?)
EOSINOPHIL # BLD AUTO: 0.26 X10(3) UL (ref 0–0.7)
EOSINOPHIL NFR BLD AUTO: 4.1 %
ERYTHROCYTE [DISTWIDTH] IN BLOOD BY AUTOMATED COUNT: 12.6 % (ref 11–15)
EST. AVERAGE GLUCOSE BLD GHB EST-MCNC: 103 MG/DL (ref 68–126)
FASTING PATIENT LIPID ANSWER: YES
FASTING STATUS PATIENT QL REPORTED: YES
GLOBULIN PLAS-MCNC: 2.2 G/DL (ref 2–3.5)
GLUCOSE BLD-MCNC: 105 MG/DL (ref 70–99)
HBA1C MFR BLD: 5.2 % (ref ?–5.7)
HCT VFR BLD AUTO: 40.7 %
HDLC SERPL-MCNC: 51 MG/DL (ref 40–59)
HGB BLD-MCNC: 14.1 G/DL (ref 7–20)
IMM GRANULOCYTES # BLD AUTO: 0.01 X10(3) UL (ref 0–1)
IMM GRANULOCYTES NFR BLD: 0.2 %
LDLC SERPL CALC-MCNC: 110 MG/DL (ref ?–100)
LYMPHOCYTES # BLD AUTO: 2.18 X10(3) UL (ref 1–4)
LYMPHOCYTES NFR BLD AUTO: 34.2 %
MCH RBC QN AUTO: 31.9 PG (ref 26–34)
MCHC RBC AUTO-ENTMCNC: 34.6 G/DL (ref 31–37)
MCV RBC AUTO: 92.1 FL
MEV IGG SER-ACNC: 178 AU/ML (ref 16.5–?)
MONOCYTES # BLD AUTO: 0.42 X10(3) UL (ref 0.1–1)
MONOCYTES NFR BLD AUTO: 6.6 %
MUV IGG SER IA-ACNC: 147 AU/ML (ref 11–?)
NEUTROPHILS # BLD AUTO: 3.42 X10 (3) UL (ref 1.5–7.7)
NEUTROPHILS # BLD AUTO: 3.42 X10(3) UL (ref 1.5–7.7)
NEUTROPHILS NFR BLD AUTO: 53.6 %
NONHDLC SERPL-MCNC: 142 MG/DL (ref ?–130)
OSMOLALITY SERPL CALC.SUM OF ELEC: 292 MOSM/KG (ref 275–295)
PLATELET # BLD AUTO: 246 10(3)UL (ref 150–450)
POTASSIUM SERPL-SCNC: 3.9 MMOL/L (ref 3.5–5.1)
PROT SERPL-MCNC: 6.9 G/DL (ref 5.7–8.2)
RBC # BLD AUTO: 4.42 X10(6)UL
RUBV IGG SER QL: POSITIVE
RUBV IGG SER-ACNC: 86 IU/ML (ref 10–?)
SODIUM SERPL-SCNC: 140 MMOL/L (ref 136–145)
TRIGL SERPL-MCNC: 182 MG/DL (ref 30–149)
TSI SER-ACNC: 2.4 UIU/ML (ref 0.55–4.78)
VIT D+METAB SERPL-MCNC: 28.8 NG/ML (ref 30–100)
VLDLC SERPL CALC-MCNC: 31 MG/DL (ref 0–30)
WBC # BLD AUTO: 6.4 X10(3) UL (ref 4–11)

## 2025-06-23 PROCEDURE — 85025 COMPLETE CBC W/AUTO DIFF WBC: CPT

## 2025-06-23 PROCEDURE — 82306 VITAMIN D 25 HYDROXY: CPT

## 2025-06-23 PROCEDURE — 80061 LIPID PANEL: CPT

## 2025-06-23 PROCEDURE — 84443 ASSAY THYROID STIM HORMONE: CPT

## 2025-06-23 PROCEDURE — 36415 COLL VENOUS BLD VENIPUNCTURE: CPT

## 2025-06-23 PROCEDURE — 83036 HEMOGLOBIN GLYCOSYLATED A1C: CPT

## 2025-06-23 PROCEDURE — 80053 COMPREHEN METABOLIC PANEL: CPT

## 2025-06-23 PROCEDURE — 86765 RUBEOLA ANTIBODY: CPT

## 2025-06-23 PROCEDURE — 86735 MUMPS ANTIBODY: CPT

## 2025-06-23 PROCEDURE — 86762 RUBELLA ANTIBODY: CPT

## 2025-06-24 ENCOUNTER — OFFICE VISIT (OUTPATIENT)
Dept: INTERNAL MEDICINE CLINIC | Facility: CLINIC | Age: 55
End: 2025-06-24
Payer: COMMERCIAL

## 2025-06-24 VITALS
DIASTOLIC BLOOD PRESSURE: 88 MMHG | HEART RATE: 99 BPM | SYSTOLIC BLOOD PRESSURE: 138 MMHG | TEMPERATURE: 98 F | HEIGHT: 62.3 IN | OXYGEN SATURATION: 94 % | RESPIRATION RATE: 20 BRPM | WEIGHT: 251.81 LBS | BODY MASS INDEX: 45.75 KG/M2

## 2025-06-24 DIAGNOSIS — F90.9 ATTENTION DEFICIT HYPERACTIVITY DISORDER (ADHD), UNSPECIFIED ADHD TYPE: ICD-10-CM

## 2025-06-24 DIAGNOSIS — Z13.820 ENCOUNTER FOR OSTEOPOROSIS SCREENING IN ASYMPTOMATIC POSTMENOPAUSAL PATIENT: ICD-10-CM

## 2025-06-24 DIAGNOSIS — Z96.652 S/P TKR (TOTAL KNEE REPLACEMENT), LEFT: ICD-10-CM

## 2025-06-24 DIAGNOSIS — Z12.4 ENCOUNTER FOR SCREENING FOR CERVICAL CANCER: ICD-10-CM

## 2025-06-24 DIAGNOSIS — N95.1 VASOMOTOR SYMPTOMS DUE TO MENOPAUSE: ICD-10-CM

## 2025-06-24 DIAGNOSIS — Z00.01 ENCOUNTER FOR GENERAL ADULT MEDICAL EXAMINATION WITH ABNORMAL FINDINGS: Primary | ICD-10-CM

## 2025-06-24 DIAGNOSIS — E73.9 LACTOSE INTOLERANCE: ICD-10-CM

## 2025-06-24 DIAGNOSIS — M17.0 BILATERAL PRIMARY OSTEOARTHRITIS OF KNEE: ICD-10-CM

## 2025-06-24 DIAGNOSIS — Z12.11 ENCOUNTER FOR SCREENING COLONOSCOPY: ICD-10-CM

## 2025-06-24 DIAGNOSIS — Z78.0 ENCOUNTER FOR OSTEOPOROSIS SCREENING IN ASYMPTOMATIC POSTMENOPAUSAL PATIENT: ICD-10-CM

## 2025-06-24 DIAGNOSIS — F33.2 MDD (MAJOR DEPRESSIVE DISORDER), RECURRENT SEVERE, WITHOUT PSYCHOSIS (HCC): ICD-10-CM

## 2025-06-24 DIAGNOSIS — F41.1 GAD (GENERALIZED ANXIETY DISORDER): ICD-10-CM

## 2025-06-24 DIAGNOSIS — Z12.31 ENCOUNTER FOR SCREENING MAMMOGRAM FOR BREAST CANCER: ICD-10-CM

## 2025-06-24 DIAGNOSIS — G43.101 MIGRAINE WITH AURA AND WITH STATUS MIGRAINOSUS, NOT INTRACTABLE: ICD-10-CM

## 2025-06-24 DIAGNOSIS — I10 ESSENTIAL HYPERTENSION: ICD-10-CM

## 2025-06-24 DIAGNOSIS — E78.5 HYPERLIPIDEMIA, UNSPECIFIED HYPERLIPIDEMIA TYPE: ICD-10-CM

## 2025-06-24 PROBLEM — R35.1 NOCTURIA: Status: RESOLVED | Noted: 2023-05-23 | Resolved: 2025-06-24

## 2025-06-24 PROBLEM — R35.0 FREQUENCY OF MICTURITION: Status: RESOLVED | Noted: 2023-05-23 | Resolved: 2025-06-24

## 2025-06-24 PROCEDURE — 3008F BODY MASS INDEX DOCD: CPT | Performed by: INTERNAL MEDICINE

## 2025-06-24 PROCEDURE — 99214 OFFICE O/P EST MOD 30 MIN: CPT | Performed by: INTERNAL MEDICINE

## 2025-06-24 PROCEDURE — 3075F SYST BP GE 130 - 139MM HG: CPT | Performed by: INTERNAL MEDICINE

## 2025-06-24 PROCEDURE — 3079F DIAST BP 80-89 MM HG: CPT | Performed by: INTERNAL MEDICINE

## 2025-06-24 PROCEDURE — 99396 PREV VISIT EST AGE 40-64: CPT | Performed by: INTERNAL MEDICINE

## 2025-06-24 NOTE — PROGRESS NOTES
The following individual(s) verbally consented to be recorded using ambient AI listening technology and understand that they can each withdraw their consent to this listening technology at any point by asking the clinician to turn off or pause the recording:     Patient name: Betty Lopez  Additional names: None.

## 2025-06-24 NOTE — ASSESSMENT & PLAN NOTE
Migraines have worsened. Current treatment with Ubrelvy and Ajovy provides partial relief, reducing severity and allowing some functionality.   - Continue Ubrelvy and Ajovy.  - Follow up with Dr. Gibson for further management.

## 2025-06-24 NOTE — ASSESSMENT & PLAN NOTE
Blood pressure slightly elevated at 138/88 mmHg, likely due to stress and migraine. Previous readings within normal limits.   - Recheck blood pressure at next visit.  - Continue current antihypertensive regimen.

## 2025-06-24 NOTE — ASSESSMENT & PLAN NOTE
Following with orthopedics.  She has noted some residual pain and stiffness in the left knee, feeling like \"something is moving around in there\".  She will be following with orthopedist in August.  For now, she continues on her chronic pain regimen as above.  She is trying stretching, doing her physical therapy exercise in the pool, tries to swim.

## 2025-06-24 NOTE — PROGRESS NOTES
REASON FOR VISIT      Betty Lopez is a 54 year old adult who presents for  Annual Physical Exam (not Medicare, or ABN signed for Medicare non covered service) and scheduled follow up of multiple significant but stable problems.     HCM   - mammogram: Patient's last mammogram was done 5/10/2023.  BI-RADS 1.  Needs repeat.  - colon: Next due 9/29/2033  - PAP: Status post supracervical total abdominal hysterectomy and bilateral salpingo-oophorectomy 2021. Patient last had PAP and HPV cotesting 5/2023 with Dr. Madie Garcia. Next due 5/2028.   - DEXA: Start at 65  - Labs: Up-to-date, reviewed today  - imm: Flu shot? COVID booster? Shingles? PNA?  Needs a flu shot on the fall, up-to-date with COVID vaccines, tetanus up-to-date, shingles up-to-date, pneumonia up-to-date, needs RSV at 60  - depression: see below     Patient was last seen on 5/14/2025 to establish care.    Migraines.  They have had migraines since puberty. Very strong family history of migraines in mother and siblings. Patient Patient is following with neurology, Dr. Gibson.  Has gotten Botox injections through Dr. Stevens. They are on Ajovy and Ubrelvy. Migraines continue unabated. They feel like the migraines are not controlled.  They have a migraine today.      Osteoarthritis.  Status post left knee TKA. They have been having some pain, weakness, and buckling. However, the pain in the left knee has improved quite a bit following the TKA. Will be contacting her ortho regarding this. Planning on the R knee when they can. Will be following with ortho at [facility] in 8/2025. Pain management includes swimming three to five times a week, which helps with exercise but also causes stiffness. Physical therapy exercises in the pool help reduce stiffness.     Hyperlipidemia.  Was at some point on atorvastatin 20 mg daily.  Lipid panel was last done on 6/18/2024, notable for an LDL of 143, and a total cholesterol of 227. Stopped because insurance did not cover --  has fhx of heart disease, should be on statin, was tolerating atorva well.  After last visit, rosuvastatin was started.  Patient currently continues on rosuvastatin 10 mg daily.     Hypertension.  Continues on lisinopril 10 mg daily.  Blood pressure initially elevated, but improved upon recheck.      GERD.  Was at some point on omeprazole 40 mg daily.  Reflux symptoms improved after cutting out dairy.     MDD/BRAULIO/ADHD. Following with psych (James Waller) and a therapist. Continues on venlafaxine 187.5 mg daily, divided in a 150 mg capsule in addition to a 37.5 mg capsule.  Furthermore, patient is also on Adderall.     Will be undergoing allergy testing with Dr. Tatum in the fall.       Past Medical History     Past Medical History[1]     Past Surgical History     Past Surgical History[2]     Family History     Family History[3]    Social History     Short Social Hx on File[4]    Tobacco:   She currently has tobacco smoking, smokeless, or e-cigarette status listed as never assessed or unknown.    Please update the information in the Tobacco history section to reflect the true status, and refresh this smartlink.    CAGE Alcohol Screen:   Cage screening not needed because reported NO to Alcohol use on social history.    Depression Screening (PHQ):  PHQ-2/9 not done in last week, please ask questions. Last done              BRAULIO-7 Total Score                 Allergies     Allergies[5]    Current Medications     Current Outpatient Medications   Medication Sig Dispense Refill    traZODone 50 MG Oral Tab Take 1-2 tablets ( mg total) by mouth nightly as needed for Sleep. (Patient taking differently: Take 1.5 tablets (75 mg total) by mouth nightly as needed for Sleep.) 180 tablet 0    amphetamine-dextroamphetamine (ADDERALL) 10 MG Oral Tab Take 1 tablet (10 mg total) by mouth 2 (two) times daily as needed. 60 tablet 0    venlafaxine ER (EFFEXOR XR) 37.5 MG Oral Capsule SR 24 Hr Take 1 cap PO daily with 150mg cap (187.5mg  daily) 90 capsule 0    venlafaxine  MG Oral Capsule SR 24 Hr Take 1 cap PO daily with 37.5mg cap (187.5mg daily) 90 capsule 0    azelastine 0.1 % Nasal Solution 1 spray by Nasal route 2 (two) times daily. (Patient taking differently: 1 spray by Nasal route as needed.)      rosuvastatin 10 MG Oral Tab Take 1 tablet (10 mg total) by mouth nightly. 90 tablet 0    LEVOCETIRIZINE 5 MG Oral Tab TAKE 1 TABLET(5 MG) BY MOUTH EVERY EVENING 90 tablet 1    hydrOXYzine 25 MG Oral Tab Take 0.5-1 tablets (12.5-25 mg total) by mouth 3 (three) times daily as needed for Anxiety (or insomnia). 90 tablet 0    Fremanezumab-vfrm (AJOVY) 225 MG/1.5ML Subcutaneous Solution Auto-injector Inject 225 mg into the skin every 30 (thirty) days. 1.5 each 11    ubrogepant (UBRELVY) 100 MG Oral Tab Take one tablet at onset of migraine.  May take additional tablet in 2 hours if needed.  Do not exceed two tablets per 24 hour period. 10 tablet 11    ferrous sulfate 325 (65 FE) MG Oral Tab EC Take 1 tablet (325 mg total) by mouth daily with breakfast.      lisinopril 10 MG Oral Tab Take 1 tablet (10 mg total) by mouth nightly. 90 tablet 3    Multiple Vitamins-Iron (MULTI-DAY PLUS IRON) Oral Tab Take by mouth.      Meloxicam 15 MG Oral Tab Take 1 tablet (15 mg total) by mouth daily. 30 tablet 0    Melatonin 10 MG Oral Cap Take by mouth.      HYDROcodone-acetaminophen 5-325 MG Oral Tab Take 1-2 tablets by mouth every 6 (six) hours as needed for Pain. (Patient not taking: Reported on 5/14/2025) 20 tablet 0    OMEPRAZOLE 40 MG Oral Capsule Delayed Release TAKE 1 CAPSULE(40 MG) BY MOUTH EVERY MORNING BEFORE BREAKFAST (Patient not taking: Reported on 6/24/2025) 90 capsule 1     No current facility-administered medications for this visit.       Screenings     History/Other:   Fall Risk Assessment:    (Incomplete)        Cognitive Assessment:    (Incomplete)  Tip  Cognitive assessment incomplete. Refresh to ensure screening pulls in; if not,click link  above to assess, then refresh:[id number]}      Functional Ability/Status:    (Incomplete)      Immunization History   Administered Date(s) Administered    Covid-19 Vaccine Moderna 100 mcg/0.5 ml 04/20/2021, 05/15/2021    Covid-19 Vaccine Moderna 50 Mcg/0.25 Ml 12/29/2021    Covid-19 Vaccine Moderna Bivalent 50mcg/0.5mL 12+ years 11/18/2022    FLULAVAL 6 months & older 0.5 ml Prefilled syringe ([id number]) 01/15/2021    Influenza Vaccine, trivalent (IIV3), PF 0.5mL (92530) 11/11/2024    Pfizer Covid-19 Vaccine 30mcg/0.3ml 12yrs+ 12/08/2023, 01/22/2025    Pneumococcal Conjugate PCV20 05/14/2025    TDAP 07/14/2021    Zoster Vaccine Recombinant Adjuvanted (Shingrix) 04/07/2023, 06/28/2023       Health Maintenance   Topic Date Due    Mammogram  05/10/2024    Annual Physical  06/18/2025    DTaP,Tdap,and Td Vaccines (2 - Td or Tdap) 07/14/2031    Colorectal Cancer Screening  09/29/2033    Influenza Vaccine  Completed    Annual Depression Screening  Completed    Pneumococcal Vaccine: 50+ Years  Completed    Zoster Vaccines  Completed    COVID-19 Vaccine  Completed    Meningococcal B Vaccine  Aged Out         Review of Systems     GENERAL HEALTH: feels well otherwise  SKIN: denies any unusual skin lesions or rashes  RESPIRATORY: denies shortness of breath with exertion  CARDIOVASCULAR: denies chest pain on exertion  GI: denies abdominal pain and denies heartburn  : denies any burning with urination, urinary frequency or urgency  NEURO: denies headaches, numbness or tingling, mental status changes  PSYCH: denies depressed mood, anxiety  MUSC: denies muscle aches, joint pain      Physical Exam     EXAM:  BP (!) 142/93 (BP Location: Left arm, Patient Position: Sitting, Cuff Size: large)   Pulse 99   Temp 97.7 °F (36.5 °C) (Temporal)   Resp 20   Ht 5' 2.3\" (1.582 m)   Wt 251 lb 12.8 oz (114.2 kg)   LMP 03/02/2021   SpO2 94%   BMI 45.61 kg/m²   >   BP Readings from Last 3 Encounters:   06/24/25 (!) 142/93   05/14/25 112/73    08/24/24 129/84     Patient's last menstrual period was 03/02/2021.  GENERAL: well developed, well nourished, in no apparent distress  SKIN: no rashes, no suspicious lesions  HEENT: atraumatic, normocephalic, ears and throat are clear  EYES:PERRLA, EOMI, conjunctiva are clear.   NECK: supple, no adenopathy, no bruits  CHEST: no chest tenderness  BREAST: deferred   LUNGS: clear to auscultation  CARDIO: RRR without murmur  GI: good BS's, no masses, HSM or tenderness  :deferred  RECTAL: deferred  MUSCULOSKELETAL: back is not tender, FROM of the back  EXTREMITIES: no cyanosis, clubbing or edema  NEURO: Oriented times three, cranial nerves are intact, motor and sensory are grossly intact  FOOT: deferred      Assessment and Plan     Betty Lopez is a 54 year old adult who presents for an Annual Health Assessment.     Assessment & Plan  Encounter for general adult medical examination with abnormal findings  Age appropriate screenings and vaccinations discussed. Counseled on healthy diet, exercise, sleep hygiene. Patient advised that any additional concerns not included in a routine physical may result in additional charges and/or a copay. Patient verbally acknowledged this information and agreed to proceed.         Encounter for screening colonoscopy  UTD. Due 2033       Encounter for screening mammogram for breast cancer  Ordered today.   Orders:    [facility] [place] MIKHAIL 2D+3D SCREENING BILAT (CPT=77067/70129); Future    Encounter for screening for cervical cancer  Next due 5/2028       Encounter for osteoporosis screening in asymptomatic postmenopausal patient  Start at 67       Vasomotor symptoms due to menopause  Continue venlafaxine.  Would not start the patient on estrogen replacement therapy given family history of cardiac disease and history of migraines with aura.       Migraine with aura and with status migrainosus, not intractable  Migraines have worsened. Current treatment with Ubrelvy and Ajovy provides partial  relief, reducing severity and allowing some functionality.   - Continue Ubrelvy and Ajovy.  - Follow up with Dr. Gibson for further management.       S/P TKR (total knee replacement), left  Bilateral primary osteoarthritis of knee  Following with orthopedics.  She has noted some residual pain and stiffness in the left knee, feeling like \"something is moving around in there\".  She will be following with orthopedist in August.  For now, she continues on her chronic pain regimen as above.  She is trying stretching, doing her physical therapy exercise in the pool, tries to swim.       Essential hypertension  Blood pressure slightly elevated at 138/88 mmHg, likely due to stress and migraine. Previous readings within normal limits.   - Recheck blood pressure at next visit.  - Continue current antihypertensive regimen.       Hyperlipidemia, unspecified hyperlipidemia type  Cholesterol levels near target with rosuvastatin. LDL slightly above goal for hypertension at 110 mg/dL but acceptable. No medication changes needed.  - Continue rosuvastatin therapy.       Lactose intolerance  Continue avoiding lactose.       Attention deficit hyperactivity disorder (ADHD), unspecified ADHD type  BRAULIO (generalized anxiety disorder)  MDD (major depressive disorder), recurrent severe, without psychosis (HCC)  Follow-up with psychiatry.  Continue medication management per psychiatry.                The patient indicates understanding of these issues and agrees to the plan.  The patient is asked to return in 6 months for office visit  Diet counseling perfomed  Exercise counseling perfomed    Electronically signed by Erica Flores MD 06/24/25         [1]   Past Medical History:   Anxiety    Death of a sister    Anxiety state    Calculus of kidney    Carpal tunnel syndrome    Depression    Essential hypertension    High blood pressure    High cholesterol    Hx of motion sickness    riding in cars    Hyperalgia    Hyperlipidemia    Knee pain,  bilateral    Migraine headache    Migraines    Osteoarthritis    Sleep apnea    Tremor    possible side-effect of medications    Visual impairment    Readers   [2]   Past Surgical History:  Procedure Laterality Date    Anesthesia for;  procedures      EAB in 20's     Colonoscopy N/A 2023    Procedure: COLONOSCOPY;  Surgeon: Naomi Garcia MD;  Location: [facility] ENDOSCOPY    Hernia surgery      Hysterectomy  2021    PROCEDURE:  Supracervical total abdominal hysterectomy and bilateral salpingo-oophorectomy.    Incisional hernia repair N/A 2022    Lap to open    Oophorectomy  2021    PROCEDURE:  Supracervical total abdominal hysterectomy and bilateral salpingo-oophorectomy.    Other surgical history  1976    surgery done as a child for bladder issue     Total abdominal hysterectomy N/A 2021    Procedure: supracervical total abdominal hysterectomy, bilateral salpingo, oophorectomy;  Surgeon: Ariana Walker MD;  Location: [facility] MAIN OR   [3]   Family History  Problem Relation Age of Onset    Heart Disorder Father     Heart Disorder Mother         Cardio Aortic aneurysm    Depression Mother         Never diagnosed,     Hypertension Mother     Migraines Mother     Breast Cancer Sister 35    Dementia Maternal Grandmother         Also Alzheimer's disease    Dementia Paternal Grandmother         Also Alzheimer's disease    Anxiety Sister     Depression Sister     Migraines Sister     Depression Sister     Migraines Sister     Traumatic brain injury Sister    [4]   Social History  Socioeconomic History    Marital status: Single   Tobacco Use    Smoking status: Former     Current packs/day: 0.00     Average packs/day: 0.5 packs/day for 13.0 years (6.5 ttl pk-yrs)     Types: Cigarettes     Start date: 1989     Quit date: 2002     Years since quittin.6    Smokeless tobacco: Never    Tobacco comments:     It wasn't consistent or daily.   Vaping Use    Vaping status: Never  Used   Substance and Sexual Activity    Alcohol use: Not Currently     Comment: not since last month in November    Drug use: Not Currently     Types: Cannabis     Comment: Never again, cannabis gives me joint pain.    Sexual activity: Not Currently   Other Topics Concern    Blood Transfusions No    Special Diet Yes     Comment: Dash Diet     Social Drivers of Health     Food Insecurity: No Food Insecurity (5/14/2025)    NCSS - Food Insecurity     Worried About Running Out of Food in the Last Year: No     Ran Out of Food in the Last Year: No   Transportation Needs: No Transportation Needs (5/14/2025)    NCSS - Transportation     Lack of Transportation: No   Housing Stability: Not At Risk (5/14/2025)    NCSS - Housing/Utilities     Has Housing: Yes     Worried About Losing Housing: No     Unable to Get Utilities: No   [5]   Allergies  Allergen Reactions    Marijuana (Cannabis Sativa) PAIN    Penicillin G NAUSEA AND VOMITING    Penicillins UNKNOWN, PAIN, FEVER, FACE FLUSHING, OTHER (SEE COMMENTS) and NAUSEA ONLY

## 2025-07-28 ENCOUNTER — HOSPITAL ENCOUNTER (OUTPATIENT)
Dept: MAMMOGRAPHY | Facility: HOSPITAL | Age: 55
Discharge: HOME | End: 2025-07-28
Attending: INTERNAL MEDICINE

## 2025-07-28 DIAGNOSIS — Z12.31 ENCOUNTER FOR SCREENING MAMMOGRAM FOR BREAST CANCER: ICD-10-CM

## 2025-07-28 PROCEDURE — 77063 BREAST TOMOSYNTHESIS BI: CPT | Performed by: INTERNAL MEDICINE

## 2025-07-28 PROCEDURE — 77067 SCR MAMMO BI INCL CAD: CPT | Performed by: INTERNAL MEDICINE

## 2025-08-14 RX ORDER — ROSUVASTATIN CALCIUM 10 MG/1
10 TABLET, COATED ORAL NIGHTLY
Qty: 90 TABLET | Refills: 3 | Status: SHIPPED | OUTPATIENT
Start: 2025-08-14

## (undated) DEVICE — 3M™ MEDITPORE™ SOFT CLOTH TAPE 6 IN X 10 YD 12 ROLLS/CASE 2966: Brand: 3M™ MEDIPORE™

## (undated) DEVICE — DRAPE SHEET LG

## (undated) DEVICE — SUT PDS II 0 CTX Z990G

## (undated) DEVICE — SUT PDS II 0 CT-1 Z340H

## (undated) DEVICE — SUTURE VICRYL 0 J906G

## (undated) DEVICE — [HIGH FLOW INSUFFLATOR,  DO NOT USE IF PACKAGE IS DAMAGED,  KEEP DRY,  KEEP AWAY FROM SUNLIGHT,  PROTECT FROM HEAT AND RADIOACTIVE SOURCES.]: Brand: PNEUMOSURE

## (undated) DEVICE — TOWEL SURG OR 17X30IN BLUE

## (undated) DEVICE — SUT CHROMIC 2-0 CT 801H

## (undated) DEVICE — 60 ML SYRINGE REGULAR TIP: Brand: MONOJECT

## (undated) DEVICE — SUTURE CHROMIC GUT 3-0 SH

## (undated) DEVICE — INSUFFLATION NEEDLE TO ESTABLISH PNEUMOPERITONEUM.: Brand: INSUFFLATION NEEDLE

## (undated) DEVICE — VIOLET BRAIDED (POLYGLACTIN 910), SYNTHETIC ABSORBABLE SUTURE: Brand: COATED VICRYL

## (undated) DEVICE — NEEDLE HPO 18GA 1.5IN ECLPS

## (undated) DEVICE — TROCAR: Brand: KII SHIELDED BLADED ACCESS SYSTEM

## (undated) DEVICE — FLOSEAL HEMOSTATIC MATRIX, 5ML: Brand: FLOSEAL HEMOSTATIC MATRIX

## (undated) DEVICE — DRAPE SHEET LAPAROTOMY

## (undated) DEVICE — TROCAR: Brand: KII FIOS FIRST ENTRY

## (undated) DEVICE — SOL  .9 1000ML BTL

## (undated) DEVICE — SOLUTION  .9 1000ML BTL

## (undated) DEVICE — SUT MONOCRYL 3-0 PS-2 Y497G

## (undated) DEVICE — SUT VICRYL 0 J608H

## (undated) DEVICE — ENSEAL X1 TISSUE SEALER, CURVED JAW, 37 CM SHAFT LENGTH: Brand: ENSEAL

## (undated) DEVICE — ENCORE® LATEX ACCLAIM SIZE 6.5, STERILE LATEX POWDER-FREE SURGICAL GLOVE: Brand: ENCORE

## (undated) DEVICE — KIT ENDO ORCAPOD 160/180/190

## (undated) DEVICE — TROCAR: Brand: KII® SLEEVE

## (undated) DEVICE — LIGACLIP MCA MULTIPLE CLIP APPLIERS, 20 LARGE CLIPS: Brand: LIGACLIP

## (undated) DEVICE — LAPAROTOMY: Brand: MEDLINE INDUSTRIES, INC.

## (undated) DEVICE — LAP CHOLE: Brand: MEDLINE INDUSTRIES, INC.

## (undated) DEVICE — GAMMEX® PI HYBRID SIZE 7, STERILE POWDER-FREE SURGICAL GLOVE, POLYISOPRENE AND NEOPRENE BLEND: Brand: GAMMEX

## (undated) DEVICE — SNARE ENDOSCOPIC 10MM ROUND

## (undated) DEVICE — SUTURE VICRYL 2-0 FS-1

## (undated) DEVICE — SUT CHROMIC GUT 2-0 SH G123H

## (undated) DEVICE — DEVICE FIX.SECURESTRAP 5MM

## (undated) DEVICE — ABDOMINAL PAD: Brand: CURITY

## (undated) DEVICE — KIT CLEAN ENDOKIT 1.1OZ GOWNX2

## (undated) DEVICE — SUTURE VICRYL 0 CT-1

## (undated) DEVICE — SUTURE PROLENE 0 CT-1

## (undated) DEVICE — FLEXIBLE YANKAUER,MEDIUM TIP, NO VACUUM CONTROL: Brand: ARGYLE

## (undated) DEVICE — MATERNITY KNIT PANTS,SEAMLESS: Brand: WINGS

## (undated) DEVICE — SUTURE CHROMIC 2-0 801H

## (undated) DEVICE — TRAP POLYP W/ 2 SPEC TY CLR MAGNIFYING WIND

## (undated) DEVICE — ENCORE® LATEX MICRO SIZE 6.5, STERILE LATEX POWDER-FREE SURGICAL GLOVE: Brand: ENCORE

## (undated) DEVICE — NON-ADHERENT PAD PREPACK: Brand: TELFA

## (undated) DEVICE — Device: Brand: DUAL NARE NASAL CANNULAE FEMALE LUER CON 7FT O2 TUBE

## (undated) DEVICE — SUTURE MONOCRYL 4-0 Y935H

## (undated) DEVICE — MEDI-VAC NON-CONDUCTIVE SUCTION TUBING 6MM X 1.8M (6FT.) L: Brand: CARDINAL HEALTH

## (undated) DEVICE — PROXIMATE SKIN STAPLERS (35 WIDE) CONTAINS 35 STAINLESS STEEL STAPLES (FIXED HEAD): Brand: PROXIMATE

## (undated) DEVICE — SUTURE PASSOR WITH GUIDE

## (undated) NOTE — LETTER
[facility] [address], [facility]   Date:   7/19/2023     Name:   Floyd Drew    YOB: 1970   MRN:   [id number]       WHERE IS YOUR PAIN NOW? Summer the areas on your body where you feel the described sensations. Use the appropriate symbol. Christina Showers the areas of radiation. Include all affected areas. Just to complete the picture, please draw in the face. ACHE:  ^ ^ ^   NUMBNESS:  0000   PINS & NEEDLES:  = = = =                              ^ ^ ^                       0000              = = = =                                    ^ ^ ^                       0000            = = = =      BURNING:  XXXX   STABBING: ////                  XXXX                ////                         XXXX          ////     Please summer the line below indicating your degree of pain right now  with 0 being no pain 10 being the worst pain possible.                                          0             1             2              3             4              5              6              7             8             9             10         Patient Signature:

## (undated) NOTE — LETTER
AUTHORIZATION FOR SURGICAL OPERATION OR OTHER PROCEDURE    1. I hereby authorize Dr. Rahul Parra and the [facility] staff assigned to my case to perform the following operation and/or procedure at the [facility]:    _______________________________________________________________________________________________     Botox 200 units    Initial Start    Referred by Dr Arthur DENIS&Bill     _______________________________________________________________________________________________    2.  My physician has explained the nature and purpose of the operation or other procedure, possible alternative methods of treatment, the risks involved, and the possibility of complication to me.  I acknowledge that no guarantee has been made as to the result that may be obtained.  3.  I recognize that, during the course of this operation, or other procedure, unforseen conditions may necessitate additional or different procedure than those listed above.  I, therefore, further authorize and request that the above named physician, his/her physician assistants or designees perform such procedures as are, in his/her professional opinion, necessary and desirable.  4.  Any tissue or organs removed in the operation or other procedure may be disposed of by and at the discretion of the [facility] staff and [facility].  5.  I understand that in the event of a medical emergency, I will be transported by local paramedics to [facility] or other hospital emergency department.  6.  I certify that I have read and fully understand the above consent to operation and/or other procedure.    7.  I acknowledge that my physician has explained sedation/analgesia administration to me including the risks and benefits.  I consent to the administration of sedation/analgesia as may be necessary or desirable in the judgement of my physician.        Witness signature: ___________________________________________________ Date:   ______/______/_____                    Time:  ________ A.M.  P.M.   Patient Name:  Betty Lopez   [id number]   11/7/1970           Patient signature:  ___________________________________________________               Statement of Physician  My signature below affirms that prior to the time of the procedure, I have explained to the patient and/or his/her guardian, the risks and benefits involved in the proposed treatment and any reasonable alternative to the proposed treatment.  I have also explained the risks and benefits involved in the refusal of the proposed treatment and have answered the patient's questions.                        Date:  ______/______/_______      Provider                      Signature:  __________________________________________________________       Time:  ___________ A.M    P.M.

## (undated) NOTE — LETTER
[address]  Authorization for Surgical Operation and Procedure                                                                                           I hereby authorize Shahid Dahl MD, my physician and his/her assistants (if applicable), which may include medical students, residents, and/or fellows, to perform the following surgical operation/ procedure and administer such anesthesia as may be determined necessary by my physician: Operation/Procedure name (s) COLONOSCOPY on Katie Hurtado   2. I recognize that during the surgical operation/procedure, unforeseen conditions may necessitate additional or different procedures than those listed above. I, therefore, further authorize and request that the above-named surgeon, assistants, or designees perform such procedures as are, in their judgment, necessary and desirable. 3.   My surgeon/physician has discussed prior to my surgery the potential benefits, risks and side effects of this procedure; the likelihood of achieving goals; and potential problems that might occur during recuperation. They also discussed reasonable alternatives to the procedure, including risks, benefits, and side effects related to the alternatives and risks related to not receiving this procedure. I have had all my questions answered and I acknowledge that no guarantee has been made as to the result that may be obtained. 4.   Should the need arise during my operation/procedure, which includes change of level of care prior to discharge, I also consent to the administration of blood and/or blood products. Further, I understand that despite careful testing and screening of blood or blood products by collecting agencies, I may still be subject to ill effects as a result of receiving a blood transfusion and/or blood products.   The following are some, but not all, of the potential risks that can occur: fever and allergic reactions, hemolytic reactions, transmission of diseases such as Hepatitis, AIDS and Cytomegalovirus (CMV) and fluid overload. In the event that I wish to have an autologous transfusion of my own blood, or a directed donor transfusion, I will discuss this with my physician. Check only if Refusing Blood or Blood Products  I understand refusal of blood or blood products as deemed necessary by my physician may have serious consequences to my condition to include possible death. I hereby assume responsibility for my refusal and release the hospital, its personnel, and my physicians from any responsibility for the consequences of my refusal.    o  Refuse   5. I authorize the use of any specimen, organs, tissues, body parts or foreign objects that may be removed from my body during the operation/procedure for diagnosis, research or teaching purposes and their subsequent disposal by hospital authorities. I also authorize the release of specimen test results and/or written reports to my treating physician on the hospital medical staff or other referring or consulting physicians involved in my care, at the discretion of the Pathologist or my treating physician. 6.   I consent to the photographing or videotaping of the operations or procedures to be performed, including appropriate portions of my body for medical, scientific, or educational purposes, provided my identity is not revealed by the pictures or by descriptive texts accompanying them. If the procedure has been photographed/videotaped, the surgeon will obtain the original picture, image, videotape or CD. The hospital will not be responsible for storage, release or maintenance of the picture, image, tape or CD.    7.   I consent to the presence of a  or observers in the operating room as deemed necessary by my physician or their designees.     8.   I recognize that in the event my procedure results in extended X-Ray/fluoroscopy time, I may develop a skin reaction. 9. If I have a Do Not Attempt Resuscitation (DNAR) order in place, that status will be suspended while in the operating room, procedural suite, and during the recovery period unless otherwise explicitly stated by me (or a person authorized to consent on my behalf). The surgeon or my attending physician will determine when the applicable recovery period ends for purposes of reinstating the DNAR order. 10. Patients having a sterilization procedure: I understand that if the procedure is successful the results will be permanent and it will therefore be impossible for me to inseminate, conceive, or bear children. I also understand that the procedure is intended to result in sterility, although the result has not been guaranteed. 11. I acknowledge that my physician has explained sedation/analgesia administration to me including the risk and benefits I consent to the administration of sedation/analgesia as may be necessary or desirable in the judgment of my physician. I CERTIFY THAT I HAVE READ AND FULLY UNDERSTAND THE ABOVE CONSENT TO OPERATION and/or OTHER PROCEDURE.     _________________________________________ _________________________________     ___________________________________  Signature of Patient     Signature of Responsible Person                   Printed Name of Responsible Person                              _________________________________________ ______________________________        ___________________________________  Signature of Witness         Date  Time         Relationship to Patient    STATEMENT OF PHYSICIAN My signature below affirms that prior to the time of the procedure; I have explained to the patient and/or his/her legal representative, the risks and benefits involved in the proposed treatment and any reasonable alternative to the proposed treatment.  I have also explained the risks and benefits involved in refusal of the proposed treatment and alternatives to the proposed treatment and have answered the patient's questions.  If I have a significant financial interest in a co-management agreement or a significant financial interest in any product or implant, or other significant relationship used in this procedure/surgery, I have disclosed this and had a discussion with my patient.     _______________________________________________________________ _____________________________  Antionette Barragan of Physician)                                                                                         (Date)                                   (Time)  Patient Name: Uzma Bhat    : 1970   Printed: 2023      Medical Record #: [id number]                                              Page 1 of 1

## (undated) NOTE — LETTER
10/6/2022          To Whom It May Concern:    Caity Torres is currently under my medical care. Lazaro Apodaca required surgery on 9-. She requires medical leave for recovery. She may return to work on light duty on 10-11-22. She has a lifting, pushing and pulling restriction of 10 pounds and sedentary work only. She may return to work full duty as of 10-26-22 with NO restrictions. If you require additional information please contact our office.         Sincerely,          Giorgi Boothe MD          Document generated by:  Claudetta Bowers

## (undated) NOTE — LETTER
AUTHORIZATION FOR SURGICAL OPERATION OR OTHER PROCEDURE    1.  I hereby authorize Dr. Chris James and [facility] staff assigned to my case to perform the following operation and/or procedure at the [facility]:    _________________________________ Time:  ________ A. M.  P.M.        Patient Name:  ______________________________________________________  (please print)      Patient signature:  ___________________________________________________             Relationship to Patient:           []  Parent

## (undated) NOTE — LETTER
AUTHORIZATION FOR SURGICAL OPERATION OR OTHER PROCEDURE    1. I hereby authorize Dr. Rahul Parra and the [facility] staff assigned to my case to perform the following operation and/or procedure at the [facility]:  Botox 200 units BUY[employer]BILL   _______________________________________________________________________________________________    Intractable chronic migraine with aura with status migrainosus   _______________________________________________________________________________________________    2.  My physician has explained the nature and purpose of the operation or other procedure, possible alternative methods of treatment, the risks involved, and the possibility of complication to me.  I acknowledge that no guarantee has been made as to the result that may be obtained.  3.  I recognize that, during the course of this operation, or other procedure, unforseen conditions may necessitate additional or different procedure than those listed above.  I, therefore, further authorize and request that the above named physician, his/her physician assistants or designees perform such procedures as are, in his/her professional opinion, necessary and desirable.  4.  Any tissue or organs removed in the operation or other procedure may be disposed of by and at the discretion of the [facility] staff and [facility].  5.  I understand that in the event of a medical emergency, I will be transported by local paramedics to [facility] or other hospital emergency department.  6.  I certify that I have read and fully understand the above consent to operation and/or other procedure.    7.  I acknowledge that my physician has explained sedation/analgesia administration to me including the risks and benefits.  I consent to the administration of sedation/analgesia as may be necessary or desirable in the judgement of my physician.    Witness signature:  ___________________________________________________ Date:  ______/______/_____                    Time:  ________ A.M.  P.M.       Patient Name:  Betty Lopez  11/7/1970  [id number]       Patient signature:  ___________________________________________________                 Statement of Physician  My signature below affirms that prior to the time of the procedure, I have explained to the patient and/or his/her guardian, the risks and benefits involved in the proposed treatment and any reasonable alternative to the proposed treatment.  I have also explained the risks and benefits involved in the refusal of the proposed treatment and have answered the patient's questions.                        Date:  ______/______/_______  Provider                      Signature:  __________________________________________________________       Time:  ___________ A.M    P.M.

## (undated) NOTE — LETTER
10/17/2022              [facility] [address]       To whom it may concern,    Please allow the above patient to work from home as she is recovering from her surgery. She will be reevaluated next week in the office.       Sincerely,      Cornell Prince MD  [facility] [id number]  [phone]        Document electronically generated by:  Cornell Prince MD

## (undated) NOTE — LETTER
Date: 2024      Patient Name: Betty Lopez     : 1970      [id number]          Thank you for choosing [facility] as your health care provider. Your physician has deemed the following medical service(s) necessary. However, your insurance plan may not pay for all of your health care and costs and may deny payment for this service. The fact that your insurance plan does not pay for an item or service does not mean you should not receive it. The purpose of this form is to help you make an informed decision about whether or not you want to receive this service(s) that may not be paid for by your insurance plan.    CPT Code Description     Cost     _________ ________ Botox 200 units ________  ___$4000___      _________ ______________________________ _____________      _________ ______________________________ _____________      I understand that the above mentioned service(s) or supply may not be covered by my insurance company. I agree to be financially responsible for the cost of this service or supply in the event of my insurance denies payment as a non-covered benefit.        ______________________________________________________________________  Signature of Patient or Patient's Representative  Relationship  Date        ______________________________________________________________________  Signature of Witness to signing of form   Printed Name        *(PLEASE INFORM THE PATIENT TO CONTACT THE OFFICE IF THE INSURANCE CHANGES WITHIN THE YEAR AS HE/SHE WILL BE RESPONSIBLE TO UPDATE THE OFFICE IF INSURANCE CHANGES SO THAT PROCEDURE IS BILLED CORRECTLY)

## (undated) NOTE — LETTER
[facility] [address], [facility]   Date:   9/5/2023     Name:   Wilton Harper    YOB: 1970   MRN:   [id number]       WHERE IS YOUR PAIN NOW? Summer the areas on your body where you feel the described sensations. Use the appropriate symbol. Melanie Jewell the areas of radiation. Include all affected areas. Just to complete the picture, please draw in the face. ACHE:  ^ ^ ^   NUMBNESS:  0000   PINS & NEEDLES:  = = = =                              ^ ^ ^                       0000              = = = =                                    ^ ^ ^                       0000            = = = =      BURNING:  XXXX   STABBING: ////                  XXXX                ////                         XXXX          ////     Please summer the line below indicating your degree of pain right now  with 0 being no pain 10 being the worst pain possible.                                          0             1             2              3             4              5              6              7             8             9             10         Patient Signature:

## (undated) NOTE — LETTER
Date: 2025      Patient Name: Betty Lopez      : 1970        Thank you for choosing [facility] as your health care provider. Your physician has deemed the following medical service(s) necessary. However, your insurance plan may not pay for all of your health care and costs and may deny payment for this service. The fact that your insurance plan does not pay for an item or service does not mean you should not receive it. The purpose of this form is to help you make an informed decision about whether or not you want to receive this service(s) that may not be paid for by your insurance plan.    CPT Code Description     Cost     Botox 200 U      $4,000  _________ ______________________________  _____________      _________ ______________________________ _____________      _________ ______________________________ _____________      I understand that the above mentioned service(s) or supply may not be covered by my insurance company. I agree to be financially responsible for the cost of this service or supply in the event of my insurance denies payment as a non-covered benefit.    5  (PLEASE INFORM THE PATIENT TO CONTACT THE OFFICE IF THE INSURANCE CHANGES WITHIN THE YEAR AS HE/SHE WILL BE RESPONSIBLE TO UPDATE THE OFFICE IF INSURANCE CHANGES SO THAT PROCEDURE IS BILLED CORRECTLY) this will be 2 of 5       ______________________________________________________________________  Signature of Patient or Patient's Representative  Relationship  Date        ______________________________________________________________________  Signature of Witness to signing of form   Printed Name

## (undated) NOTE — LETTER
No referring provider defined for this encounter. 01/10/23        Patient: Shala Titus   YOB: 1970   Date of Visit: 1/10/2023       Dear  Dr. Rafaela Rueda MD,      Thank you for referring Shala Titus to my practice. Please find my assessment and plan below. Repeat MRI brain and MRI C spine  To evaluate for demyelinating lesions (but suspicion is lower). Continue effexor. If no improvement after cpap then consider adding botox or another agent.            Sincerely,       Sussy Knight DO   [address] [address]    Document electronically generated by:  Sussy Knight DO

## (undated) NOTE — LETTER
[facility]  [address]  [facility] [address]  [phone]            9/3/24        Betty Lopez  YOB: 1970  Member ID # : [id number]     Case Reference Number : [id number]         I am writing to provide additional information to support my decision to START treating Betty Lopez with Botox. This decision was made after careful assessment of this patient's current health status. In brief, starting treatment with Botox for migraines is medically appropriate and necessary as it has high efficacy and should be a covered and reimbursed service.     Betty initially presented in the office on 11/10/2022 with having had migraines since she was a teenager. Since then, the migraines have worsened in frequency and intensity- getting 14-16 migraines per month. Betty has tried and failed Effexor, Propranolol, Emgality, Ibuprofen, and otc medications, No tricyclic antidepressants already on 2 antidepressants at home, contraindications to triptans - Caused thoughts of self harm, discontinued Topiramate due to nephrolithais.  Betty has tried alternate short-term treatments with poor response. The patient has frequent episodes of pressure, headache feeling that lead to migraines that are associated with, nausea, and occasional double vision with auras when the migraines arise. Due to the rapid onset of the patient's symptoms, I have determined that she should try/start Botox. With the start of Botox, Betty should have significant relief of her migraines -with lessened frequency as well as intensity.     The goal of starting the use of the medication is to continue to reduce the number of migraines per month, to limit new inflammatory activity in the central nervous system and to delay progression of disability. It is my professional opinion that starting this patient on Botox is a treatment plan that will provide the patient the best long-term  success.     Due to Betty's current state it is imperative that she start a treatment plan that she feels is manageable and is committed to on a long term basis.      My decision to start Botox for my patient is one that will promote adherence to a modifying treatment plan, will treat the patients migraines more aggressively and will ultimately help prevent the patients high risk of debilitating migraines. I, therefore, request a consideration of coverage for Botox as I feel this is a medically necessary service and should be covered. If this office may be of further assistance, please do not hesitate to contact us. I look forward to your timely reply.     Sincerely,             Nelson Gibson, DO  Neurology

## (undated) NOTE — Clinical Note
Dear Jazz Schuler,  Thank you for sending Diana Lopezn to see me for electrodiagnostic consultation. I appreciate your confidence in me to care for your patients. Please feel free call me with any questions at [phone] or contact me through [facility]   Sincerely, Sheila Tabares MD Board Certified, Physical Medicine and Rehabilitation Specializing in [address] [facility], Spine Medicine and [address] [facility]

## (undated) NOTE — LETTER
[address] [place] [address]      Authorization for Surgical Operation and Procedure     Date:___________                                                                                                         Time:_______ products. The following are some, but not all, of the potential risks that can occur: fever and allergic reactions, hemolytic reactions, transmission of diseases such as Hepatitis, AIDS and Cytomegalovirus (CMV) and fluid overload.   In the event that I wi on my behalf). The surgeon or my attending physician will determine when the applicable recovery period ends for purposes of reinstating the DNAR order.   10. Patients having a sterilization procedure: I understand that if the procedure is successful the re a discussion with my patient.     _______________________________________________________________ _____________________________  [facility] Physician)                                                                                         (Date)

## (undated) NOTE — LETTER
To Whom It May Concern:    Kayla Hernandez has been under our care regarding ongoing medical issues. Because of this, Kayla Hernandez has been required to restrict [address] physical activities. Kayla Hernandez may resume [address] usual activities, including work, on tomorrow, October 26, 2022 with the following restrictions:    [x]  None     []    No heavy lifting (over 15 pounds) for               weeks   []    Part-time (no more than             hours per week) for               week   []  Other:        Please feel free to contact us if there are any questions.       Sincerely,        Dea Blandon MD  [facility] [id number] [phone]        Document generated by:  Dea Blandon MD